# Patient Record
Sex: FEMALE | Race: WHITE | NOT HISPANIC OR LATINO | Employment: FULL TIME | URBAN - METROPOLITAN AREA
[De-identification: names, ages, dates, MRNs, and addresses within clinical notes are randomized per-mention and may not be internally consistent; named-entity substitution may affect disease eponyms.]

---

## 2017-01-26 ENCOUNTER — TELEPHONE (OUTPATIENT)
Dept: OBSTETRICS AND GYNECOLOGY | Facility: CLINIC | Age: 27
End: 2017-01-26

## 2017-01-26 NOTE — TELEPHONE ENCOUNTER
----- Message from Homa Ward MA sent at 2017  7:35 AM EST -----      ----- Message -----     From: Cynthia Reyer     Sent: 2017   3:28 PM       To: Homa Ward MA    Pt is without insurance. She may not not be able to have the Implanon removed at the time it is supposed to be . Will this harm her at all? Also, she is trying to figure out if she comes in as self pay can she be put on another generic birth control while she still has the device in? She said she thinks it expires the middle of March. If so, she will get the device removed when she gets insurance. Call back # [phone]    It is ok to keep it in longer than the 3 years but it loses effectivity. It still releases hormones, so I would not want to add birth control pills on top of the the Nexplanon. Can you find out how much it would cost her without insurance for us to take it out and when it is due out? If she cannot afford the fee, [facility].

## 2019-01-29 ENCOUNTER — TELEPHONE (OUTPATIENT)
Dept: OBSTETRICS AND GYNECOLOGY | Age: 29
End: 2019-01-29

## 2019-01-29 NOTE — TELEPHONE ENCOUNTER
NEW PATIENT  Insurance: [facility]  Patient needs AE and family planning.

## 2019-02-22 DIAGNOSIS — Z00.00 ROUTINE HEALTH MAINTENANCE: Primary | ICD-10-CM

## 2019-02-27 ENCOUNTER — CLINICAL SUPPORT (OUTPATIENT)
Dept: FAMILY MEDICINE CLINIC | Facility: CLINIC | Age: 29
End: 2019-02-27

## 2019-02-27 DIAGNOSIS — Z00.00 ROUTINE HEALTH MAINTENANCE: Primary | ICD-10-CM

## 2019-02-27 PROCEDURE — 85025 COMPLETE CBC W/AUTO DIFF WBC: CPT | Performed by: INTERNAL MEDICINE

## 2019-02-28 LAB
ALBUMIN SERPL-MCNC: 4.6 G/DL (ref 3.5–5.2)
ALBUMIN/GLOB SERPL: 1.8 G/DL
ALP SERPL-CCNC: 101 U/L (ref 39–117)
ALT SERPL-CCNC: 18 U/L (ref 1–33)
APPEARANCE UR: CLEAR
AST SERPL-CCNC: 11 U/L (ref 1–32)
BACTERIA #/AREA URNS HPF: ABNORMAL /HPF
BILIRUB SERPL-MCNC: 0.2 MG/DL (ref 0.1–1.2)
BILIRUB UR QL STRIP: NEGATIVE
BUN SERPL-MCNC: 10 MG/DL (ref 6–20)
BUN/CREAT SERPL: 15.4 (ref 7–25)
CALCIUM SERPL-MCNC: 9.5 MG/DL (ref 8.6–10.5)
CASTS URNS MICRO: ABNORMAL
CHLORIDE SERPL-SCNC: 102 MMOL/L (ref 98–107)
CHOLEST SERPL-MCNC: 172 MG/DL (ref 0–200)
CO2 SERPL-SCNC: 24.4 MMOL/L (ref 22–29)
COLOR UR: YELLOW
CREAT SERPL-MCNC: 0.65 MG/DL (ref 0.57–1)
EPI CELLS #/AREA URNS HPF: ABNORMAL /HPF
GLOBULIN SER CALC-MCNC: 2.6 GM/DL
GLUCOSE SERPL-MCNC: 102 MG/DL (ref 65–99)
GLUCOSE UR QL: NEGATIVE
HDLC SERPL-MCNC: 38 MG/DL (ref 40–60)
HGB UR QL STRIP: NEGATIVE
KETONES UR QL STRIP: NEGATIVE
LDLC SERPL CALC-MCNC: 103 MG/DL (ref 0–100)
LDLC/HDLC SERPL: 2.7 {RATIO}
LEUKOCYTE ESTERASE UR QL STRIP: ABNORMAL
NITRITE UR QL STRIP: NEGATIVE
PH UR STRIP: <=5 [PH] (ref 5–8)
POTASSIUM SERPL-SCNC: 4.2 MMOL/L (ref 3.5–5.2)
PROT SERPL-MCNC: 7.2 G/DL (ref 6–8.5)
PROT UR QL STRIP: NEGATIVE
RBC #/AREA URNS HPF: ABNORMAL /HPF
SODIUM SERPL-SCNC: 142 MMOL/L (ref 136–145)
SP GR UR: 1.02 (ref 1–1.03)
TRIGL SERPL-MCNC: 157 MG/DL (ref 0–150)
UROBILINOGEN UR STRIP-MCNC: ABNORMAL MG/DL
VLDLC SERPL CALC-MCNC: 31.4 MG/DL (ref 5–40)
WBC #/AREA URNS HPF: ABNORMAL /HPF

## 2019-03-04 ENCOUNTER — OFFICE VISIT (OUTPATIENT)
Dept: FAMILY MEDICINE CLINIC | Facility: CLINIC | Age: 29
End: 2019-03-04

## 2019-03-04 VITALS
RESPIRATION RATE: 16 BRPM | OXYGEN SATURATION: 98 % | BODY MASS INDEX: 37.4 KG/M2 | TEMPERATURE: 98.6 F | HEIGHT: 66 IN | DIASTOLIC BLOOD PRESSURE: 82 MMHG | WEIGHT: 232.7 LBS | SYSTOLIC BLOOD PRESSURE: 136 MMHG | HEART RATE: 93 BPM

## 2019-03-04 DIAGNOSIS — E66.9 OBESITY (BMI 35.0-39.9 WITHOUT COMORBIDITY): ICD-10-CM

## 2019-03-04 DIAGNOSIS — R73.9 HYPERGLYCEMIA: ICD-10-CM

## 2019-03-04 DIAGNOSIS — Z00.00 WELL ADULT EXAM: Primary | ICD-10-CM

## 2019-03-04 DIAGNOSIS — R03.0 ELEVATED BLOOD PRESSURE READING IN OFFICE WITH WHITE COAT SYNDROME, WITHOUT DIAGNOSIS OF HYPERTENSION: ICD-10-CM

## 2019-03-04 DIAGNOSIS — R73.01 ELEVATED FASTING GLUCOSE: Primary | ICD-10-CM

## 2019-03-04 LAB — HBA1C MFR BLD: 5.4 %

## 2019-03-04 PROCEDURE — 99395 PREV VISIT EST AGE 18-39: CPT | Performed by: INTERNAL MEDICINE

## 2019-03-04 PROCEDURE — 83036 HEMOGLOBIN GLYCOSYLATED A1C: CPT | Performed by: INTERNAL MEDICINE

## 2019-03-04 RX ORDER — NORETHINDRONE 0.35 MG/1
TABLET ORAL
COMMUNITY
Start: 2019-02-17 | End: 2019-03-04

## 2019-03-05 PROBLEM — R73.9 HYPERGLYCEMIA: Status: ACTIVE | Noted: 2019-03-05

## 2019-03-05 PROBLEM — Z00.00 WELL ADULT EXAM: Status: ACTIVE | Noted: 2019-03-05

## 2019-03-05 PROBLEM — E66.9 OBESITY (BMI 35.0-39.9 WITHOUT COMORBIDITY): Status: ACTIVE | Noted: 2019-03-05

## 2019-03-05 PROBLEM — R03.0 ELEVATED BLOOD PRESSURE READING IN OFFICE WITH WHITE COAT SYNDROME, WITHOUT DIAGNOSIS OF HYPERTENSION: Status: ACTIVE | Noted: 2019-03-05

## 2019-03-05 LAB
Lab: NORMAL
TSH SERPL DL<=0.005 MIU/L-ACNC: 2.62 MIU/ML (ref 0.27–4.2)
WRITTEN AUTHORIZATION: NORMAL

## 2019-03-05 NOTE — PROGRESS NOTES
Subjective   Belem Mcknight is a 28 y.o. female. Patient is here today for   Chief Complaint   Patient presents with   • Annual Exam     CPE           Vitals:    03/04/19 1439   BP: 136/82   Pulse: 93   Resp: 16   Temp: 98.6 °F (37 °C)   SpO2: 98%       The following portions of the patient's history were reviewed and updated as appropriate: allergies, current medications, past family history, past medical history, past social history, past surgical history and problem list.    History reviewed. No pertinent past medical history.   No Known Allergies   Social History     Socioeconomic History   • Marital status: Single     Spouse name: Not on file   • Number of children: Not on file   • Years of education: Not on file   • Highest education level: Not on file   Social Needs   • Financial resource strain: Not on file   • Food insecurity - worry: Not on file   • Food insecurity - inability: Not on file   • Transportation needs - medical: Not on file   • Transportation needs - non-medical: Not on file   Occupational History   • Not on file   Tobacco Use   • Smoking status: Never Smoker   Substance and Sexual Activity   • Alcohol use: No   • Drug use: No   • Sexual activity: Not on file   Other Topics Concern   • Not on file   Social History Narrative   • Not on file      No current outpatient medications on file.     EKG  ECG Report    Objective   This 28-year-old patient is here for a comprehensive physical exam.  She has an upcoming appointment to see a gynecologist to establish care with them.           Belem Mcknight 28 y.o. female who presents for an Annual Wellness Visit.  she has a history of   Patient Active Problem List   Diagnosis   • Hyperglycemia   • Well adult exam   • Elevated blood pressure reading in office with white coat syndrome, without diagnosis of hypertension   • Obesity (BMI 35.0-39.9 without comorbidity)   .  she has been doing well with new interval problems.  Labs results discussed in detail with  the patient.  Plan to update vaccines if needed today.        Lab Results (most recent)     None            Review of Systems   Constitutional: Negative.    HENT: Negative.    Eyes: Negative.    Respiratory: Negative.    Cardiovascular: Negative.    Gastrointestinal: Negative.    Endocrine: Negative.    Genitourinary: Negative.    Musculoskeletal: Negative.    Skin: Negative.    Allergic/Immunologic: Negative.    Neurological: Negative.    Hematological: Negative.    Psychiatric/Behavioral: Negative.        Physical Exam   Constitutional: She is oriented to person, place, and time. She appears well-developed and well-nourished. No distress.   Pleasant, neatly groomed, BMI 37.   HENT:   Head: Normocephalic and atraumatic.   Right Ear: External ear normal.   Left Ear: External ear normal.   Nose: Nose normal.   Mouth/Throat: Oropharynx is clear and moist. No oropharyngeal exudate.   Eyes: Conjunctivae and EOM are normal. Pupils are equal, round, and reactive to light. Right eye exhibits no discharge. Left eye exhibits no discharge. No scleral icterus.   Neck: Normal range of motion. Neck supple. No JVD present. No tracheal deviation present. No thyromegaly present.   Cardiovascular: Normal rate, regular rhythm, normal heart sounds and intact distal pulses. Exam reveals no gallop and no friction rub.   No murmur heard.  Pulmonary/Chest: Effort normal and breath sounds normal. No stridor. No respiratory distress. She has no wheezes. She has no rales. She exhibits no tenderness.   Abdominal: Soft. Bowel sounds are normal. She exhibits no distension and no mass. There is no tenderness. There is no rebound and no guarding. No hernia.   Genitourinary:   Genitourinary Comments: Deferred to gynecology.   Musculoskeletal: Normal range of motion. She exhibits no edema, tenderness or deformity.   Lymphadenopathy:     She has no cervical adenopathy.   Neurological: She is alert and oriented to person, place, and time. She  displays normal reflexes. No cranial nerve deficit or sensory deficit. She exhibits normal muscle tone. Coordination normal.   Skin: Skin is warm and dry. Capillary refill takes less than 2 seconds. No rash noted. She is not diaphoretic. No erythema. No pallor.   Psychiatric: She has a normal mood and affect. Her behavior is normal. Judgment and thought content normal.   Nursing note and vitals reviewed.      ASSESSMENT       Problem List Items Addressed This Visit        Digestive    Obesity (BMI 35.0-39.9 without comorbidity)       Other    Hyperglycemia    Well adult exam - Primary    Elevated blood pressure reading in office with white coat syndrome, without diagnosis of hypertension          PLAN  This patient and I discussed her labs.  She had an elevated fasting glucose of 102.  Check a hemoglobin A1c on her which was 5.4%.  I told her that I was concerned about her elevated glucose, and her obesity.  In the future, any medical condition that she develops will be a result of being obese.  She is struggled with being overweight all of her life.  She and her  recently begun  to pay attention to the dietary habits in an effort to lose weight.    She has elevated blood pressure today.  Perhaps this is an artifact of meeting me for the first time.  I did ask her to follow-up in 3-4 months to recheck her blood pressures.    She will be following up with gynecology.      There are no Patient Instructions on file for this visit.    No Follow-up on file.

## 2019-03-08 ENCOUNTER — TELEPHONE (OUTPATIENT)
Dept: FAMILY MEDICINE CLINIC | Facility: CLINIC | Age: 29
End: 2019-03-08

## 2019-03-08 NOTE — TELEPHONE ENCOUNTER
Pt was called and told per Octavio Tsh was normal pt also wanted to know why her Thyroid was enlarged as advised by  at last appt. I advised I would ask him and get back in contact with her. PT understood.     ----- Message from Alejandra Amaya sent at 3/8/2019  9:42 AM EST -----  Pt called in to get results from labs that  did in office on last visit on 3/4/19.  Please contact pt with results at [phone]

## 2019-03-11 ENCOUNTER — TELEPHONE (OUTPATIENT)
Dept: FAMILY MEDICINE CLINIC | Facility: CLINIC | Age: 29
End: 2019-03-11

## 2019-03-11 DIAGNOSIS — E04.9 ENLARGED THYROID: Primary | ICD-10-CM

## 2019-03-11 NOTE — TELEPHONE ENCOUNTER
PER. DR. BONILLA WOULD LIKE TO GET AN U/S OF HER THYROID.  THYROIDMEGALLUPE. AND WOULD LIKE HER TO F/U IN OFFICE TO DISCUSS. PT HAS BEEN ADVISED TO F/U UP WITHIN 48 OF HAVING U/S. SHE WAS TOLD TO CALL AFTER SCHEDULING TO HAVE U/S DONE TO CALL OFFICE AND SCHEDULE WITHIN 48HOURS OF HAVING F/U WITH DR. BONILLA AND DO NOT WAIT FOR U/S TO BE DONE TO CALL AND SCHEDULE. PT UNDERSTOOD.

## 2019-03-18 ENCOUNTER — HOSPITAL ENCOUNTER (OUTPATIENT)
Dept: ULTRASOUND IMAGING | Facility: HOSPITAL | Age: 29
Discharge: HOME | End: 2019-03-18
Admitting: INTERNAL MEDICINE

## 2019-03-18 PROCEDURE — 76536 US EXAM OF HEAD AND NECK: CPT

## 2019-03-20 ENCOUNTER — TELEPHONE (OUTPATIENT)
Dept: FAMILY MEDICINE CLINIC | Facility: CLINIC | Age: 29
End: 2019-03-20

## 2019-03-20 DIAGNOSIS — E04.1 THYROID NODULE: Primary | ICD-10-CM

## 2019-03-20 NOTE — TELEPHONE ENCOUNTER
CALLED PT ADVISED PT PER DR. BONILLA U/S OF THYROID     PROBABLY BENIGN, HOWEVER HE WOULD LIKE HER TO SEE DR. RAMÍREZ FORWITH. TO GET HIS OPIONION.     PT WAS PRAMDO Curran AND UNDERSTANDS.

## 2019-03-26 ENCOUNTER — OFFICE VISIT (OUTPATIENT)
Dept: OBSTETRICS AND GYNECOLOGY | Age: 29
End: 2019-03-26

## 2019-03-26 VITALS
DIASTOLIC BLOOD PRESSURE: 82 MMHG | WEIGHT: 293 LBS | HEIGHT: 66 IN | SYSTOLIC BLOOD PRESSURE: 126 MMHG | BODY MASS INDEX: 47.09 KG/M2

## 2019-03-26 DIAGNOSIS — Z12.4 SCREENING FOR MALIGNANT NEOPLASM OF CERVIX: ICD-10-CM

## 2019-03-26 DIAGNOSIS — Z13.9 SPECIAL SCREENING: Primary | ICD-10-CM

## 2019-03-26 DIAGNOSIS — E66.01 CLASS 3 SEVERE OBESITY DUE TO EXCESS CALORIES WITHOUT SERIOUS COMORBIDITY WITH BODY MASS INDEX (BMI) OF 50.0 TO 59.9 IN ADULT (HCC): ICD-10-CM

## 2019-03-26 DIAGNOSIS — Z31.69 ENCOUNTER FOR PRECONCEPTION CONSULTATION: ICD-10-CM

## 2019-03-26 PROBLEM — E66.813 CLASS 3 SEVERE OBESITY DUE TO EXCESS CALORIES WITHOUT SERIOUS COMORBIDITY IN ADULT: Status: ACTIVE | Noted: 2019-03-26

## 2019-03-26 LAB
B-HCG UR QL: NEGATIVE
INTERNAL NEGATIVE CONTROL: NEGATIVE
INTERNAL POSITIVE CONTROL: POSITIVE
Lab: NORMAL

## 2019-03-26 PROCEDURE — 99213 OFFICE O/P EST LOW 20 MIN: CPT | Performed by: OBSTETRICS & GYNECOLOGY

## 2019-03-26 PROCEDURE — 99385 PREV VISIT NEW AGE 18-39: CPT | Performed by: OBSTETRICS & GYNECOLOGY

## 2019-03-26 PROCEDURE — 81025 URINE PREGNANCY TEST: CPT | Performed by: OBSTETRICS & GYNECOLOGY

## 2019-03-26 RX ORDER — ACETAMINOPHEN AND CODEINE PHOSPHATE 120; 12 MG/5ML; MG/5ML
1 SOLUTION ORAL DAILY
Qty: 28 TABLET | Refills: 11 | Status: SHIPPED | OUTPATIENT
Start: 2019-03-26 | End: 2020-08-03

## 2019-03-26 RX ORDER — ACETAMINOPHEN AND CODEINE PHOSPHATE 120; 12 MG/5ML; MG/5ML
1 SOLUTION ORAL DAILY
COMMUNITY
End: 2019-03-26 | Stop reason: SDUPTHER

## 2019-03-26 RX ORDER — CETIRIZINE HYDROCHLORIDE 5 MG/1
5 TABLET ORAL DAILY
COMMUNITY
End: 2021-02-20 | Stop reason: HOSPADM

## 2019-03-26 NOTE — PROGRESS NOTES
Routine Annual Visit    3/26/2019    Patient: Belem Mcknight          MR#:[id number]      Chief Complaint   Patient presents with   • Gynecologic Exam     New pt. annual. last pap 2013 per pt        History of Present Illness    28 y.o. female  who presents for annual exam. She is not trying to get pregnant right now, however she would like to get pregnant in the near future.  She is currently on progesterone only contraception as she had adverse effects on a combined OCP.  She does have a history of regular menses.  Her  has no other children by another partner  She denies any significant medical problems besides her obesity    Health Maintenance  Gardasil unsure  Last pap 2013 per pt  Mammogram NA  Colonoscopy NA  PCP Octavio MD    Patient's last menstrual period was 2019 (exact date).  Obstetric History:  OB History      Para Term  AB Living    0 0 0 0 0 0    SAB TAB Ectopic Molar Multiple Live Births    0 0 0 0 0 0         Menstrual History:     Patient's last menstrual period was 2019 (exact date).       Sexual History:   Sexually active currently    ________________________________________  Patient Active Problem List   Diagnosis   • Class 3 severe obesity due to excess calories without serious comorbidity in adult (CMS/HCC)   • Encounter for preconception consultation       Past Medical History:   Diagnosis Date   • Headache    • Thyroid nodule        Family History   Problem Relation Age of Onset   • Colon cancer Maternal Grandmother    • Hypertension Maternal Grandfather    • Heart disease Paternal Aunt    • Depression Mother    • OCD Mother    • Neuropathy Mother    • Diabetes Paternal Grandmother    • Heart disease Paternal Grandmother    • Breast cancer Neg Hx    • Ovarian cancer Neg Hx    • Uterine cancer Neg Hx        History reviewed. No pertinent surgical history.    Social History     Tobacco Use   Smoking Status Never Smoker   Smokeless Tobacco Never Used  "      has a current medication list which includes the following prescription(s): cetirizine and norethindrone.  ________________________________________    Current contraception: oral progesterone-only contraceptive  History of abnormal Pap smear: no  Family history of Breast, ovarian, uterine, colon, pancreatic cancer: yes - Colon cancer in maternal grand mother  History of abnormal mammogram: no    The following portions of the patient's history were reviewed and updated as appropriate: allergies, current medications, past family history, past medical history, past social history, past surgical history and problem list.    Review of Systems    Objective   Physical Exam    /82   Ht 167.6 cm (66\")   Wt (!) 153 kg (338 lb)   LMP 02/18/2019 (Exact Date)   BMI 54.55 kg/m²    BP Readings from Last 3 Encounters:   03/26/19 126/82      Wt Readings from Last 3 Encounters:   03/26/19 (!) 153 kg (338 lb)         BMI: Body mass index is 54.55 kg/m².       General:   alert, appears stated age and cooperative   Heart:: regular rate and rhythm, S1, S2 normal, no murmur, click, rub or gallop   Lungs: normal respiratory effort and auscultation   Abdomen: soft, non-tender, without masses or organomegaly   Breast: inspection negative, no nipple discharge or bleeding, no masses or nodularity palpable   Urethra and bladder: urethral meatus normal; bladder nontender to palpation;   Vulva: normal, Bartholin's, Urethra, [place]'s normal   Vagina: normal mucosa, normal discharge   Cervix: no lesions and nulliparous appearance   Uterus: normal size or anteverted   Adnexa: normal adnexa and no mass, fullness, tenderness       Assessment:    normal annual exam     Plan:    Plan     []  Mammogram request made  [x]  PAP done  []  Labs:   []  GC/Chl/TV  []  DEXA scan   []  Referral for colonoscopy:     Problems Addressed this Visit        Digestive    Class 3 severe obesity due to excess calories without serious comorbidity in adult " (CMS/[facility])     Obesity is unchanged.  Discussed the patient's BMI.  The BMI is above average; BMI management plan is completed.  General weight loss/lifestyle modification strategies discussed (elicit support from others; identify saboteurs; non-food rewards, etc).    Plan to use whole 30 diet              Other    Encounter for preconception consultation     Her  may be a carrier for cystic fibrosis, desires carrier screening today  Recommended weight loss prior to getting pregnant  Also recommended taking folate containing prenatal vitamin prior to pregnancy           Other Visit Diagnoses     Special screening    -  Primary    Relevant Orders    POC Pregnancy, Urine (Completed)    Screening for malignant neoplasm of cervix        Relevant Orders    IGP, Apt HPV,rfx 16 / 18,45 (Completed)          Counseling  [x]  Nutrition  [x]  Physical activity/regular exercise   [x]  Healthy weight  []  Injury prevention  []  Smoking cessation  []  Substance misuse/abuse  [x]  Sexual behavior  []  STD prevention  [x]  Contraception  []  Dental health  []  Mental health  []  Immunization  []  Encouraged SBE      Patient's BMI is Body mass index is 54.55 kg/m²., which is classified as obese. We discussed health consequences of obesity and recommended weight loss. I counseled regarding diet modifications and exercise in order to reach weight loss goal.     Ana Perdomo MD  03/26/2019  11:45 AM

## 2019-03-26 NOTE — ASSESSMENT & PLAN NOTE
Her  may be a carrier for cystic fibrosis, desires carrier screening today  Recommended weight loss prior to getting pregnant  Also recommended taking folate containing prenatal vitamin prior to pregnancy

## 2019-03-26 NOTE — ASSESSMENT & PLAN NOTE
Obesity is unchanged.  Discussed the patient's BMI.  The BMI is above average; BMI management plan is completed.  General weight loss/lifestyle modification strategies discussed (elicit support from others; identify saboteurs; non-food rewards, etc).    Plan to use whole 30 diet

## 2019-03-29 ENCOUNTER — TELEPHONE (OUTPATIENT)
Dept: OBSTETRICS AND GYNECOLOGY | Age: 29
End: 2019-03-29

## 2019-03-29 LAB
CYTOLOGIST CVX/VAG CYTO: NORMAL
CYTOLOGY CVX/VAG DOC THIN PREP: NORMAL
DX ICD CODE: NORMAL
HIV 1 & 2 AB SER-IMP: NORMAL
HPV I/H RISK 4 DNA CVX QL PROBE+SIG AMP: NEGATIVE
Lab: NORMAL
OTHER STN SPEC: NORMAL
PATH REPORT.FINAL DX SPEC: NORMAL
STAT OF ADQ CVX/VAG CYTO-IMP: NORMAL

## 2019-03-29 NOTE — TELEPHONE ENCOUNTER
----- Message from Ana Perdomo MD sent at 3/29/2019 11:46 AM EDT -----  Please let her know that her pap is normal. Thanks.    ----- Message -----  From: Parvin Michaud MA  Sent: 3/26/2019  10:46 AM  To: Ana Perdomo MD

## 2019-04-01 ENCOUNTER — TELEPHONE (OUTPATIENT)
Dept: OBSTETRICS AND GYNECOLOGY | Age: 29
End: 2019-04-01

## 2019-04-01 NOTE — TELEPHONE ENCOUNTER
Dr Perdomo pt states she has received a bill for genetic testing from 3/26/19 yet needs to discuss her bill she received. Pt has concerns that her portion was quoted in office as less than $400 but her total bill was over $1900. Also stated that the bill with self pay stated it would be $500. Per  Marleni Mouser, Dr Perdomo's MA may be able to begin the process with pt. Also pt is aware that out , Roseanne is out for the week due to a family emergency.

## 2019-04-11 ENCOUNTER — TELEPHONE (OUTPATIENT)
Dept: OBSTETRICS AND GYNECOLOGY | Age: 29
End: 2019-04-11

## 2019-04-11 NOTE — TELEPHONE ENCOUNTER
----- Message from Ana Perdomo MD sent at 4/11/2019 11:00 AM EDT -----  Please let her know that her carrier status for 274 various genetic disorders is negative. Please also print out report and mail to her. Thanks!    ----- Message -----  From: Mechelle, Scans Incoming  Sent: 4/11/2019   7:03 AM  To: Ana Perdomo MD

## 2020-02-06 ENCOUNTER — TELEPHONE (OUTPATIENT)
Dept: OBSTETRICS AND GYNECOLOGY | Age: 30
End: 2020-02-06

## 2020-02-06 NOTE — TELEPHONE ENCOUNTER
(Conor schwartz) Pt is requesting to be seen sometime soon to be tested for pcos. Pt recently has had a lot of hair loss on her hair but hair gain on other parts of her body. Pt is also gaining weight while dieting/excersize. Pt has had her thyroid check and that was cleared. Please advise.     [phone]

## 2020-02-10 ENCOUNTER — OFFICE VISIT (OUTPATIENT)
Dept: FAMILY MEDICINE CLINIC | Facility: CLINIC | Age: 30
End: 2020-02-10

## 2020-02-10 VITALS
BODY MASS INDEX: 47.09 KG/M2 | SYSTOLIC BLOOD PRESSURE: 128 MMHG | WEIGHT: 293 LBS | OXYGEN SATURATION: 97 % | DIASTOLIC BLOOD PRESSURE: 78 MMHG | RESPIRATION RATE: 16 BRPM | TEMPERATURE: 98.6 F | HEART RATE: 86 BPM | HEIGHT: 66 IN

## 2020-02-10 DIAGNOSIS — R35.0 URINE FREQUENCY: Primary | ICD-10-CM

## 2020-02-10 DIAGNOSIS — E66.01 CLASS 3 SEVERE OBESITY DUE TO EXCESS CALORIES WITHOUT SERIOUS COMORBIDITY WITH BODY MASS INDEX (BMI) OF 50.0 TO 59.9 IN ADULT (HCC): ICD-10-CM

## 2020-02-10 DIAGNOSIS — R10.84 GENERALIZED ABDOMINAL PAIN: ICD-10-CM

## 2020-02-10 LAB
B-HCG UR QL: NEGATIVE
BILIRUB BLD-MCNC: NEGATIVE MG/DL
CLARITY, POC: CLEAR
COLOR UR: YELLOW
GLUCOSE UR STRIP-MCNC: NEGATIVE MG/DL
HCT VFR BLDA CALC: 47.1 % (ref 38–51)
HGB BLDA-MCNC: 15.6 G/DL (ref 12–17)
INTERNAL NEGATIVE CONTROL: NEGATIVE
INTERNAL POSITIVE CONTROL: NEGATIVE
KETONES UR QL: NEGATIVE
LEUKOCYTE EST, POC: NEGATIVE
Lab: NORMAL
MCH, POC: 32.9
MCHC, POC: 33.2
MCV, POC: 99.1
NITRITE UR-MCNC: NEGATIVE MG/ML
PH UR: 8.5 [PH] (ref 5–8)
PLATELET # BLD: 388 10*3/MM3
PMV BLD: 7.1 FL
PROT UR STRIP-MCNC: NEGATIVE MG/DL
RBC # UR STRIP: NEGATIVE /UL
RBC, POC: 4.75
RDW, POC: 12.1
SP GR UR: 1.02 (ref 1–1.03)
T3 SERPL-MCNC: 128 NG/DL (ref 80–200)
T4 FREE SERPL-MCNC: 1.17 NG/DL (ref 0.93–1.7)
TSH SERPL DL<=0.005 MIU/L-ACNC: 2.36 UIU/ML (ref 0.27–4.2)
UROBILINOGEN UR QL: NORMAL
WBC # BLD: 10.6 10*3/UL

## 2020-02-10 PROCEDURE — 85025 COMPLETE CBC W/AUTO DIFF WBC: CPT | Performed by: NURSE PRACTITIONER

## 2020-02-10 PROCEDURE — 81003 URINALYSIS AUTO W/O SCOPE: CPT | Performed by: NURSE PRACTITIONER

## 2020-02-10 PROCEDURE — 99214 OFFICE O/P EST MOD 30 MIN: CPT | Performed by: NURSE PRACTITIONER

## 2020-02-10 PROCEDURE — 81025 URINE PREGNANCY TEST: CPT | Performed by: NURSE PRACTITIONER

## 2020-02-10 NOTE — PROGRESS NOTES
"Amara Mcknight is a 29 y.o.. female.     Abdominal Pain   This is a new problem. The current episode started yesterday. The problem occurs intermittently. The problem has been unchanged. The pain is located in the RUQ and generalized abdominal region. The quality of the pain is cramping, aching and colicky. The abdominal pain radiates to the RUQ and periumbilical region. Associated symptoms include frequency and nausea. Pertinent negatives include no diarrhea, dysuria, fever, hematuria or vomiting. The pain is aggravated by movement. Relieved by: laying on side, in fetal position. Treatments tried: gas x and antacids (tums) The treatment provided no relief.       The following portions of the patient's history were reviewed and updated as appropriate: allergies, current medications, past family history, past medical history, past social history, past surgical history and problem list.    Past Medical History:   Diagnosis Date   • Headache    • Thyroid nodule        No past surgical history on file.    Review of Systems   Constitutional: Negative for fever.   Gastrointestinal: Positive for abdominal pain and nausea. Negative for diarrhea and vomiting.        Distended, bloated, gassy, belching  1 bm q day, soft   Genitourinary: Positive for frequency. Negative for dysuria, hematuria and urgency.     LMP:1/11/2020     No Known Allergies    Objective     Vitals:    02/10/20 1106   BP: 128/78   Pulse: 86   Resp: 16   Temp: 98.6 °F (37 °C)   SpO2: 97%   Weight: (!) 150 kg (331 lb)   Height: 167.6 cm (65.98\")     Body mass index is 53.45 kg/m².    Physical Exam   Constitutional: She is oriented to person, place, and time. She appears well-developed and well-nourished.   HENT:   Head: Normocephalic.   Eyes: Pupils are equal, round, and reactive to light.   Cardiovascular: Normal rate and regular rhythm.   Pulmonary/Chest: Effort normal and breath sounds normal.   Abdominal: Soft. Normal appearance and bowel " sounds are normal. She exhibits no distension and no mass. There is no hepatosplenomegaly. There is tenderness in the right upper quadrant and periumbilical area. There is no rigidity, no rebound and no guarding.   Musculoskeletal: Normal range of motion.   Neurological: She is alert and oriented to person, place, and time.   Vitals reviewed.        Current Outpatient Medications:   •  cetirizine (zyrTEC) 5 MG tablet, Take 5 mg by mouth Daily., Disp: , Rfl:   •  norethindrone (ACOSTA) 0.35 MG tablet, Take 1 tablet by mouth Daily., Disp: 28 tablet, Rfl: 11    Lab Results (last 24 hours)     ** No results found for the last 24 hours. **            Assessment/Plan   Belem was seen today for abdominal pain.    Diagnoses and all orders for this visit:    Urine frequency  -     POC Urinalysis Dipstick, Automated    Generalized abdominal pain  -     POC CBC With / Auto Diff  -     POCT pregnancy, urine  -     Cancel: XR abdomen flat and upright; Future  -     US Abdomen Complete; Future    Class 3 severe obesity due to excess calories without serious comorbidity with body mass index (BMI) of 50.0 to 59.9 in adult (CMS/HCC)  -     TSH  -     T4, free  -     T3        Patient Instructions   Drink plenty of water  Avoid dairy, greasy/fried foods, and spicy foods  [place]/brat diet        Return if symptoms worsen or fail to improve.

## 2020-02-11 ENCOUNTER — HOSPITAL ENCOUNTER (OUTPATIENT)
Dept: ULTRASOUND IMAGING | Facility: HOSPITAL | Age: 30
Discharge: HOME | End: 2020-02-11
Admitting: NURSE PRACTITIONER

## 2020-02-11 DIAGNOSIS — R10.84 GENERALIZED ABDOMINAL PAIN: ICD-10-CM

## 2020-02-11 PROCEDURE — 76700 US EXAM ABDOM COMPLETE: CPT

## 2020-02-12 DIAGNOSIS — K80.80 BILIARY CALCULUS OF OTHER SITE WITHOUT OBSTRUCTION: Primary | ICD-10-CM

## 2020-02-13 ENCOUNTER — TELEPHONE (OUTPATIENT)
Dept: FAMILY MEDICINE CLINIC | Facility: CLINIC | Age: 30
End: 2020-02-13

## 2020-02-13 NOTE — TELEPHONE ENCOUNTER
Called and spoke with pt regarding u/s results. Pt informed of referral to general surgery. Pt informed if having any worsening symptoms to go to ER. Pt verb. Understanding.

## 2020-07-16 ENCOUNTER — TELEPHONE (OUTPATIENT)
Dept: OBSTETRICS AND GYNECOLOGY | Age: 30
End: 2020-07-16

## 2020-07-16 NOTE — TELEPHONE ENCOUNTER
I s/w Germaine about this over the phone. Work pt in for u/s tomorrow morning but she needs to go to ER for worsening pain and/or bleeding.

## 2020-07-16 NOTE — TELEPHONE ENCOUNTER
(Conor pt) Pt's lmp was 6/7/2020 and she is scheduled for 8/3/2020, patient just went to the bathroom and has a very light faint pink spotting, pt is having light cramping with it that feel like menstrual cramps. Pt felt like she pulled a stomach muscle yesterday when she tried to get up to quickly off the couch. Please advise.     [phone]

## 2020-07-17 ENCOUNTER — PROCEDURE VISIT (OUTPATIENT)
Dept: OBSTETRICS AND GYNECOLOGY | Age: 30
End: 2020-07-17

## 2020-07-17 ENCOUNTER — OFFICE VISIT (OUTPATIENT)
Dept: OBSTETRICS AND GYNECOLOGY | Age: 30
End: 2020-07-17

## 2020-07-17 VITALS
WEIGHT: 293 LBS | BODY MASS INDEX: 47.09 KG/M2 | SYSTOLIC BLOOD PRESSURE: 118 MMHG | HEIGHT: 66 IN | DIASTOLIC BLOOD PRESSURE: 72 MMHG

## 2020-07-17 DIAGNOSIS — E66.9 OBESITY (BMI 35.0-39.9 WITHOUT COMORBIDITY): Primary | ICD-10-CM

## 2020-07-17 DIAGNOSIS — O36.80X0 ENCOUNTER TO DETERMINE FETAL VIABILITY OF PREGNANCY, SINGLE OR UNSPECIFIED FETUS: ICD-10-CM

## 2020-07-17 DIAGNOSIS — O20.0 THREATENED ABORTION: Primary | ICD-10-CM

## 2020-07-17 PROCEDURE — 76817 TRANSVAGINAL US OBSTETRIC: CPT | Performed by: OBSTETRICS & GYNECOLOGY

## 2020-07-17 PROCEDURE — 99214 OFFICE O/P EST MOD 30 MIN: CPT | Performed by: NURSE PRACTITIONER

## 2020-07-17 NOTE — PROGRESS NOTES
"Subjective   Belem Mcknight is a 29 y.o. female is being seen today for   Chief Complaint   Patient presents with   • Threatened Miscarriage     early ob c/o spotting yesterday no cramping   .    History of Present Illness     Patient here with pink/brown spotting yesterday and positive pregnancy test  Cycles were previously normal- very regular  States LMP was 6/7 (it was way early and lasted 3 days).  She did have a day of \"heavy\" bleeding during that time  She checked a test on 6/25 due to some nausea and breast enlargement and it was +  Since then she had not had bleeding or concerns  She did notice some more often car sickness, cramping and nipple sensitivity  They were trying for pregnancy and are very excited    She had carrier screening last year and it was negative  She is unsure of her blood type  + history of varicella as a child  Previously diagnosed with thyroid nodule-she saw ENT and labs were normal  She was supposed to recheck ultrasound but hasn't had that scheduled yet  She has no new health concerns or medication changes, she is only taking PNV with DHA    The following portions of the patient's history were reviewed and updated as appropriate: allergies, current medications, past family history, past medical history, past social history, past surgical history and problem list.    /72   Ht 167.6 cm (66\")   Wt (!) 148 kg (326 lb)   LMP 06/07/2020 (Exact Date)   Breastfeeding No   BMI 52.62 kg/m²     Review of Systems   Constitutional: Negative.    HENT: Negative.    Eyes: Negative.    Respiratory: Negative.         Nipple sensitivity   Cardiovascular: Negative.    Gastrointestinal: Negative.    Endocrine: Negative.    Genitourinary: Negative.         Brown spotting   Musculoskeletal: Negative.    Skin: Negative.    Allergic/Immunologic: Negative.    Neurological: Negative.    Hematological: Negative.    Psychiatric/Behavioral: Negative.        Objective   Physical Exam   Constitutional: " "She is oriented to person, place, and time. She appears well-developed and well-nourished.   Cardiovascular: Normal rate and regular rhythm.   Pulmonary/Chest: Effort normal.   Abdominal: Soft. Bowel sounds are normal. She exhibits no distension. There is no tenderness.   Neurological: She is alert and oriented to person, place, and time.   Skin: Skin is warm and dry.   Psychiatric: She has a normal mood and affect. Her behavior is normal.         Assessment/Plan   Belem was seen today for threatened miscarriage.    Diagnoses and all orders for this visit:    Threatened   -     OB Panel With HIV  -     Urine Culture - Urine, Urine, Clean Catch  -     TSH  -     Hemoglobin A1c      Ultrasound done- per LMP would be 5w5d but ultrasound shows 8w3d.  Previous \"period\" would have actually been early pregnancy- check type and screen to verify negative antibodies  + cardiac, yolk sac and fetal pole, normal ovaries  Pelvic rest for now and call with additional bleeding  Plan follow up as scheduled for NOB with Dr Yarelis george advised         "

## 2020-07-19 LAB
ABO GROUP BLD: ABNORMAL
BACTERIA UR CULT: ABNORMAL
BACTERIA UR CULT: ABNORMAL
BASOPHILS # BLD AUTO: 0 X10E3/UL (ref 0–0.2)
BASOPHILS NFR BLD AUTO: 0 %
BLD GP AB SCN SERPL QL: NEGATIVE
EOSINOPHIL # BLD AUTO: 0.1 X10E3/UL (ref 0–0.4)
EOSINOPHIL NFR BLD AUTO: 1 %
ERYTHROCYTE [DISTWIDTH] IN BLOOD BY AUTOMATED COUNT: 12.9 % (ref 11.7–15.4)
HBA1C MFR BLD: 5.2 % (ref 4.8–5.6)
HBV SURFACE AG SERPL QL IA: NEGATIVE
HCT VFR BLD AUTO: 43.2 % (ref 34–46.6)
HCV AB S/CO SERPL IA: <0.1 S/CO RATIO (ref 0–0.9)
HGB BLD-MCNC: 14.6 G/DL (ref 11.1–15.9)
HIV 1+2 AB+HIV1 P24 AG SERPL QL IA: NON REACTIVE
IMM GRANULOCYTES # BLD AUTO: 0 X10E3/UL (ref 0–0.1)
IMM GRANULOCYTES NFR BLD AUTO: 0 %
LYMPHOCYTES # BLD AUTO: 2 X10E3/UL (ref 0.7–3.1)
LYMPHOCYTES NFR BLD AUTO: 19 %
MCH RBC QN AUTO: 33.9 PG (ref 26.6–33)
MCHC RBC AUTO-ENTMCNC: 33.8 G/DL (ref 31.5–35.7)
MCV RBC AUTO: 100 FL (ref 79–97)
MONOCYTES # BLD AUTO: 0.8 X10E3/UL (ref 0.1–0.9)
MONOCYTES NFR BLD AUTO: 8 %
NEUTROPHILS # BLD AUTO: 7.6 X10E3/UL (ref 1.4–7)
NEUTROPHILS NFR BLD AUTO: 72 %
OTHER ANTIBIOTIC SUSC ISLT: ABNORMAL
PLATELET # BLD AUTO: 311 X10E3/UL (ref 150–450)
RBC # BLD AUTO: 4.31 X10E6/UL (ref 3.77–5.28)
RH BLD: POSITIVE
RPR SER QL: NON REACTIVE
RUBV IGG SERPL IA-ACNC: 7.08 INDEX
TSH SERPL DL<=0.005 MIU/L-ACNC: 1.36 UIU/ML (ref 0.27–4.2)
WBC # BLD AUTO: 10.6 X10E3/UL (ref 3.4–10.8)

## 2020-07-23 ENCOUNTER — TELEPHONE (OUTPATIENT)
Dept: OBSTETRICS AND GYNECOLOGY | Age: 30
End: 2020-07-23

## 2020-07-23 RX ORDER — NITROFURANTOIN 25; 75 MG/1; MG/1
100 CAPSULE ORAL 2 TIMES DAILY
Qty: 14 CAPSULE | Refills: 0 | Status: SHIPPED | OUTPATIENT
Start: 2020-07-23 | End: 2020-07-30

## 2020-07-23 NOTE — TELEPHONE ENCOUNTER
----- Message from ARIES Mccauley sent at 7/23/2020 11:33 AM EDT -----  Please notify patient her urine culture returned positive for infection. Antibiotic sent to pharmacy.

## 2020-07-24 NOTE — TELEPHONE ENCOUNTER
Gave pt results. She has already picked up RX and has started taking the medicine, so far no complications with RX.

## 2020-08-03 ENCOUNTER — INITIAL PRENATAL (OUTPATIENT)
Dept: OBSTETRICS AND GYNECOLOGY | Age: 30
End: 2020-08-03

## 2020-08-03 ENCOUNTER — PROCEDURE VISIT (OUTPATIENT)
Dept: OBSTETRICS AND GYNECOLOGY | Age: 30
End: 2020-08-03

## 2020-08-03 VITALS — SYSTOLIC BLOOD PRESSURE: 132 MMHG | BODY MASS INDEX: 51.33 KG/M2 | DIASTOLIC BLOOD PRESSURE: 70 MMHG | WEIGHT: 293 LBS

## 2020-08-03 DIAGNOSIS — E66.9 OBESITY (BMI 35.0-39.9 WITHOUT COMORBIDITY): Primary | ICD-10-CM

## 2020-08-03 DIAGNOSIS — O36.80X0 ENCOUNTER TO DETERMINE FETAL VIABILITY OF PREGNANCY, SINGLE OR UNSPECIFIED FETUS: ICD-10-CM

## 2020-08-03 DIAGNOSIS — Z13.89 SCREENING FOR HEMATURIA OR PROTEINURIA: Primary | ICD-10-CM

## 2020-08-03 DIAGNOSIS — Z34.00 SUPERVISION OF NORMAL FIRST PREGNANCY, ANTEPARTUM: ICD-10-CM

## 2020-08-03 PROBLEM — Z00.00 WELL ADULT EXAM: Status: RESOLVED | Noted: 2019-03-05 | Resolved: 2020-08-03

## 2020-08-03 LAB
GLUCOSE UR STRIP-MCNC: NEGATIVE MG/DL
PROT UR STRIP-MCNC: NEGATIVE MG/DL

## 2020-08-03 PROCEDURE — 76817 TRANSVAGINAL US OBSTETRIC: CPT | Performed by: OBSTETRICS & GYNECOLOGY

## 2020-08-03 PROCEDURE — 0502F SUBSEQUENT PRENATAL CARE: CPT | Performed by: OBSTETRICS & GYNECOLOGY

## 2020-08-03 NOTE — PROGRESS NOTES
Chief Complaint   Patient presents with   • Routine Prenatal Visit     10w3d, no complaints. last pap 3/26/19(-)        HPI:  Pt presents for routine prenatal visit   She has had a little bit of spotting recently, yesterday she had bright red blood when she wiped.  Today she had a tiny bit of spotting as well but was just brown.  She has no cramping.    She brings up that she has a long history of back pain and sciatica.  She notes that she had a fracture of her tailbone previously and was told that she should just have a .  She brings up that she would like to schedule a .  She has never had any pelvic surgery, did not have any repair of pelvic fracture.  Mild nausea  Working from home    ROS:  No fever or chills,  no contractions, no leg pain, no LE edema,  no headache, no dysuria  All other systems reviewed and negative    Exam:  See flow sheet  General:  Alert and oriented and no distress  Neck: no lymphadenopathy or thyromegaly  Lungs: non - labored breathing  Abd:  See flow sheet, fundus nontender  Ext: see flow sheet, non-tender bilateral , no lesions  Neuro: grossly normal  Pelvic exam with scant brown blood in the vaginal vault, no active bleeding from the cervix  Cervix appears closed    Assessment:/ PLAN:  29 y.o.  at 10w3d     her bleedings appears to be benign, normal viable IUP today, blood type is B+    Normal PNL except UTI, she received treatment and plan to repeat urine culture next time    She declines genetic screening, she did have a normal carrier screening prior to pregnancy    I discussed with her that a  delivery did not appear to be indicated from the history that she gives.  I discussed the increased maternal morbidity associated with  delivery, especially with her increased BMI.  If otherwise safe and appropriate, would recommend vaginal delivery.    We discussed in detail risks of obesity with pregnancy and recommendation for limited weight  gain.    Pregnancy Risk:  HIGH RISK     Return for 4 wks OB Follow up.    Ana Perdomo MD  08/03/2020  14:37

## 2020-08-28 ENCOUNTER — TELEPHONE (OUTPATIENT)
Dept: OBSTETRICS AND GYNECOLOGY | Age: 30
End: 2020-08-28

## 2020-08-28 NOTE — TELEPHONE ENCOUNTER
Patient is currently 14 weeks pregnant and states the pain is between her belly button and pubic bone.

## 2020-08-28 NOTE — TELEPHONE ENCOUNTER
(Conor Pt)     Pt called stating when she is laying on her back, she feels a sharp poking pain under her bellybutton.Pt states its very intermittent and denies discharge, no bleeding, no abdominal pain.     [phone]

## 2020-08-28 NOTE — TELEPHONE ENCOUNTER
It could be bowel or bladder.  If she is not having any changes or concerns with either of those I would just observe for now to see if it passes but if she has increasing pain, fever or bleeding she should go to ER to be checked out.

## 2020-09-02 ENCOUNTER — ROUTINE PRENATAL (OUTPATIENT)
Dept: OBSTETRICS AND GYNECOLOGY | Age: 30
End: 2020-09-02

## 2020-09-02 VITALS — WEIGHT: 293 LBS | DIASTOLIC BLOOD PRESSURE: 80 MMHG | SYSTOLIC BLOOD PRESSURE: 124 MMHG | BODY MASS INDEX: 50.2 KG/M2

## 2020-09-02 DIAGNOSIS — Z34.02 PRENATAL CARE, FIRST PREGNANCY, SECOND TRIMESTER: Primary | ICD-10-CM

## 2020-09-02 DIAGNOSIS — Z13.89 SCREENING FOR HEMATURIA OR PROTEINURIA: ICD-10-CM

## 2020-09-02 DIAGNOSIS — O23.42 URINARY TRACT INFECTION IN MOTHER DURING SECOND TRIMESTER OF PREGNANCY: ICD-10-CM

## 2020-09-02 DIAGNOSIS — Z3A.14 14 WEEKS GESTATION OF PREGNANCY: ICD-10-CM

## 2020-09-02 LAB
BILIRUB BLD-MCNC: NEGATIVE MG/DL
CLARITY, POC: CLEAR
COLOR UR: YELLOW
GLUCOSE UR STRIP-MCNC: NEGATIVE MG/DL
GLUCOSE UR STRIP-MCNC: NEGATIVE MG/DL
KETONES UR QL: NEGATIVE
LEUKOCYTE EST, POC: NEGATIVE
NITRITE UR-MCNC: NEGATIVE MG/ML
PH UR: 6.5 [PH] (ref 5–8)
PROT UR STRIP-MCNC: NEGATIVE MG/DL
PROT UR STRIP-MCNC: NEGATIVE MG/DL
RBC # UR STRIP: NEGATIVE /UL
SP GR UR: 1.02 (ref 1–1.03)
UROBILINOGEN UR QL: NORMAL

## 2020-09-02 PROCEDURE — 0502F SUBSEQUENT PRENATAL CARE: CPT | Performed by: OBSTETRICS & GYNECOLOGY

## 2020-09-04 LAB
BACTERIA UR CULT: NORMAL
BACTERIA UR CULT: NORMAL

## 2020-09-30 ENCOUNTER — PROCEDURE VISIT (OUTPATIENT)
Dept: OBSTETRICS AND GYNECOLOGY | Age: 30
End: 2020-09-30

## 2020-09-30 ENCOUNTER — ROUTINE PRENATAL (OUTPATIENT)
Dept: OBSTETRICS AND GYNECOLOGY | Age: 30
End: 2020-09-30

## 2020-09-30 VITALS — BODY MASS INDEX: 49.55 KG/M2 | WEIGHT: 293 LBS | DIASTOLIC BLOOD PRESSURE: 70 MMHG | SYSTOLIC BLOOD PRESSURE: 110 MMHG

## 2020-09-30 DIAGNOSIS — Z36.89 ENCOUNTER FOR FETAL ANATOMIC SURVEY: Primary | ICD-10-CM

## 2020-09-30 DIAGNOSIS — Z13.89 SCREENING FOR HEMATURIA OR PROTEINURIA: Primary | ICD-10-CM

## 2020-09-30 DIAGNOSIS — Z13.1 SCREENING FOR DIABETES MELLITUS: ICD-10-CM

## 2020-09-30 LAB
BILIRUB BLD-MCNC: ABNORMAL MG/DL
GLUCOSE UR STRIP-MCNC: NEGATIVE MG/DL
GLUCOSE UR STRIP-MCNC: NEGATIVE MG/DL
KETONES UR QL: ABNORMAL
LEUKOCYTE EST, POC: NEGATIVE
NITRITE UR-MCNC: NEGATIVE MG/ML
PH UR: 5.5 [PH] (ref 5–8)
PROT UR STRIP-MCNC: ABNORMAL MG/DL
PROT UR STRIP-MCNC: NEGATIVE MG/DL
RBC # UR STRIP: NEGATIVE /UL
SP GR UR: 1.02 (ref 1–1.03)
UROBILINOGEN UR QL: NORMAL

## 2020-09-30 PROCEDURE — 76805 OB US >/= 14 WKS SNGL FETUS: CPT | Performed by: OBSTETRICS & GYNECOLOGY

## 2020-09-30 PROCEDURE — 0502F SUBSEQUENT PRENATAL CARE: CPT | Performed by: OBSTETRICS & GYNECOLOGY

## 2020-09-30 NOTE — PROGRESS NOTES
Chief Complaint   Patient presents with   • Routine Prenatal Visit     18w3d, no complaints      She says she has been drinking lots of water but her urine still looks dark.  She had a bulging disc recently, was standing washing her dishes and all of a sudden she felt something pop in her back, she says she popped the disc back in.  She has been going to the chiropractor.    Anatomy scan normal but incomplete, return in 4 weeks for follow-up views.  Early glucose challenge today  Declined flu vaccine  Recommend against chiropractor, recommended alternative of physical therapy.  Declines currently.    Ana Perdomo MD  9/30/2020  16:37 EDT

## 2020-10-01 LAB — GLUCOSE 1H P 50 G GLC PO SERPL-MCNC: 138 MG/DL (ref 65–139)

## 2020-10-05 DIAGNOSIS — O99.810 ABNORMAL GLUCOSE AFFECTING PREGNANCY: Primary | ICD-10-CM

## 2020-10-09 ENCOUNTER — LAB (OUTPATIENT)
Dept: OBSTETRICS AND GYNECOLOGY | Age: 30
End: 2020-10-09

## 2020-10-10 ENCOUNTER — RESULTS ENCOUNTER (OUTPATIENT)
Dept: OBSTETRICS AND GYNECOLOGY | Age: 30
End: 2020-10-10

## 2020-10-10 DIAGNOSIS — O99.810 ABNORMAL GLUCOSE AFFECTING PREGNANCY: ICD-10-CM

## 2020-10-10 LAB
GLUCOSE 1H P 100 G GLC PO SERPL-MCNC: 159 MG/DL (ref 65–179)
GLUCOSE 2H P 100 G GLC PO SERPL-MCNC: 118 MG/DL (ref 65–154)
GLUCOSE 3H P 100 G GLC PO SERPL-MCNC: 58 MG/DL (ref 65–139)
GLUCOSE P FAST SERPL-MCNC: 81 MG/DL (ref 65–94)

## 2020-10-26 ENCOUNTER — PROCEDURE VISIT (OUTPATIENT)
Dept: OBSTETRICS AND GYNECOLOGY | Age: 30
End: 2020-10-26

## 2020-10-26 ENCOUNTER — ROUTINE PRENATAL (OUTPATIENT)
Dept: OBSTETRICS AND GYNECOLOGY | Age: 30
End: 2020-10-26

## 2020-10-26 VITALS — DIASTOLIC BLOOD PRESSURE: 80 MMHG | WEIGHT: 293 LBS | SYSTOLIC BLOOD PRESSURE: 126 MMHG | BODY MASS INDEX: 48.74 KG/M2

## 2020-10-26 DIAGNOSIS — Z13.89 SCREENING FOR HEMATURIA OR PROTEINURIA: Primary | ICD-10-CM

## 2020-10-26 DIAGNOSIS — IMO0002 EVALUATE ANATOMY NOT SEEN ON PRIOR SONOGRAM: ICD-10-CM

## 2020-10-26 DIAGNOSIS — O44.03 PLACENTA PREVIA, THIRD TRIMESTER: ICD-10-CM

## 2020-10-26 DIAGNOSIS — Z34.00 SUPERVISION OF NORMAL FIRST PREGNANCY, ANTEPARTUM: ICD-10-CM

## 2020-10-26 LAB
GLUCOSE UR STRIP-MCNC: NEGATIVE MG/DL
PROT UR STRIP-MCNC: NEGATIVE MG/DL

## 2020-10-26 PROCEDURE — 0502F SUBSEQUENT PRENATAL CARE: CPT | Performed by: OBSTETRICS & GYNECOLOGY

## 2020-10-26 NOTE — PROGRESS NOTES
Chief Complaint   Patient presents with   • Routine Prenatal Visit     22w6d, no complaints      She is well, no complaints currently  No nausea, vomiting, eating well    Follow up anatomy still limited today, plan referral to [facility]  Early GCT abnl, 3 hr normal    Posterior placenta previa- pelvic rest and will check again    4 wk FU tdap and repeat GCT    Ana Perdomo MD  10/26/2020  15:15 EDT

## 2020-11-11 ENCOUNTER — OFFICE VISIT (OUTPATIENT)
Dept: OBSTETRICS AND GYNECOLOGY | Facility: CLINIC | Age: 30
End: 2020-11-11

## 2020-11-11 ENCOUNTER — TELEPHONE (OUTPATIENT)
Dept: OBSTETRICS AND GYNECOLOGY | Age: 30
End: 2020-11-11

## 2020-11-11 ENCOUNTER — HOSPITAL ENCOUNTER (OUTPATIENT)
Dept: ULTRASOUND IMAGING | Facility: HOSPITAL | Age: 30
Discharge: HOME | End: 2020-11-11
Admitting: OBSTETRICS & GYNECOLOGY

## 2020-11-11 ENCOUNTER — TRANSCRIBE ORDERS (OUTPATIENT)
Dept: ULTRASOUND IMAGING | Facility: HOSPITAL | Age: 30
End: 2020-11-11

## 2020-11-11 VITALS
HEART RATE: 103 BPM | TEMPERATURE: 97.7 F | WEIGHT: 293 LBS | DIASTOLIC BLOOD PRESSURE: 57 MMHG | BODY MASS INDEX: 47.09 KG/M2 | HEIGHT: 66 IN | SYSTOLIC BLOOD PRESSURE: 124 MMHG

## 2020-11-11 DIAGNOSIS — Z34.00 SUPERVISION OF NORMAL FIRST PREGNANCY, ANTEPARTUM: ICD-10-CM

## 2020-11-11 DIAGNOSIS — Z36.89 ENCOUNTER FOR ULTRASOUND TO ASSESS FETAL GROWTH: ICD-10-CM

## 2020-11-11 DIAGNOSIS — E66.01 CLASS 3 SEVERE OBESITY DUE TO EXCESS CALORIES WITHOUT SERIOUS COMORBIDITY WITH BODY MASS INDEX (BMI) OF 50.0 TO 59.9 IN ADULT (HCC): Primary | ICD-10-CM

## 2020-11-11 DIAGNOSIS — Z13.79 GENETIC SCREENING: ICD-10-CM

## 2020-11-11 DIAGNOSIS — O28.5 ABNORMAL CHROMOSOMAL AND GENETIC FINDING ON ANTENATAL SCREENING MOTHER: Primary | ICD-10-CM

## 2020-11-11 DIAGNOSIS — Z91.89 AT RISK FOR HYPERTENSION: ICD-10-CM

## 2020-11-11 DIAGNOSIS — O44.00 PLACENTA PREVIA ANTEPARTUM: ICD-10-CM

## 2020-11-11 PROCEDURE — 99243 OFF/OP CNSLTJ NEW/EST LOW 30: CPT | Performed by: OBSTETRICS & GYNECOLOGY

## 2020-11-11 PROCEDURE — 76811 OB US DETAILED SNGL FETUS: CPT

## 2020-11-11 PROCEDURE — 76817 TRANSVAGINAL US OBSTETRIC: CPT

## 2020-11-11 PROCEDURE — 76817 TRANSVAGINAL US OBSTETRIC: CPT | Performed by: OBSTETRICS & GYNECOLOGY

## 2020-11-11 PROCEDURE — 76811 OB US DETAILED SNGL FETUS: CPT | Performed by: OBSTETRICS & GYNECOLOGY

## 2020-11-11 NOTE — TELEPHONE ENCOUNTER
(Conor pt)  Pt returning Dr. Perdomo's call concerning her labs.   Please call pt back at your earliest convenience.    [phone]

## 2020-11-12 ENCOUNTER — TELEPHONE (OUTPATIENT)
Dept: OBSTETRICS AND GYNECOLOGY | Age: 30
End: 2020-11-12

## 2020-11-12 NOTE — TELEPHONE ENCOUNTER
Patient called, pt not'd of message left in the encounter, pt would like to speak with  personally    Pt is aware that  is out of office and will return call at earliest convenience.

## 2020-11-12 NOTE — PROGRESS NOTES
"MATERNAL FETAL MEDICINE OUTPATIENT NOTE     Dear Dr Perdomo     This is a  new visit.     Thank you for requesting my service to provide consultation for Belem Mcknight is a 29 y.o.   at 25 1/7 weeks gestation (Estimated Date of Delivery: 21).   She is being seen for fetal assessment and genetic consultation.     Today, she denies headache, blurry vision, RUQ pain. No vaginal bleeding, no contractions.     Chief complaint    ICD-10-CM ICD-9-CM   1. Abnormal chromosomal and genetic finding on  screening mother  O28.5 796.5   2. Genetic screening  Z13.79 V82.79   3. BMI 40.0-44.9, adult (CMS/[facility])  Z68.41 V85.41   4. Placenta previa antepartum  O44.00 641.13   5. Encounter for ultrasound to assess fetal growth  Z36.89 V28.3   6. At risk for hypertension  Z91.89 V15.89       Past obstetric, gynecological, medical, surgical, family and social history reviewed; no changes made.     Review of systems  Constitutional:  No Weight Change, No Fever, No Chills, No Fatigue, No Malaise  ENT/Mouth:  No Hearing Changes, No Sinus Pain, No Hoarseness, No sore throat, No   Eyes:  No Eye Pain, No Vision Changes  Cardiovascular:  No Chest Pain, No SOB, No Palpitations  Respiratory:  No Cough, No Wheezing, No Dyspnea  Gastrointestinal:  No Nausea, No Vomiting, No Diarrhea, No Constipation, No Pain   Genitourinary:   No Dysuria, No Urinary Frequency, No Hematuria, No Flank Pain  Musculoskeletal:  No Arthralgias, No Myalgias,   Skin:  No Skin Lesions, No Pruritis,   Neuro:  No Weakness, No Numbness, No Loss of Consciousness, No Syncope  Psych:  No Memory Changes, No Violence/Abuse Hx., No Eating Concerns  Heme/Lymph:  No Bruising, No Bleeding  Endocrine:   No Temperature Intolerance    Vitals:    20 1207   BP: 124/57   BP Location: Right arm   Patient Position: Sitting   Cuff Size: Large Adult   Pulse: 103   Temp: 97.7 °F (36.5 °C)   TempSrc: Infrared   Weight: (!) 137 kg (302 lb)   Height: 167.6 cm (66\") "       PHYSICAL EXAM   GENERAL: Not in acute distress, gravid   NEURO: awake, alert and oriented to person, place, and time  ABDOMINAL: No fundal tenderness, no rebound or guarding   EXTREMITIES: Bilaterally lower extremities No edema, no tenderness  PELVIC: No obvious vaginal discharge, no bleeding    ULTRASOUND   Please view full ultrasound note on Imaging tab in ViewPoint.      ASSESSMENT   29 y.o.   at 25 1/7  weeks gestation (Estimated Date of Delivery: 21), reassuring fetal and maternal status.     1. Single live intrauterine pregnancy   [ X ] stable  [   ] improving [  ] worsening    2. NIPT results - insufficient fetal DNA  [ X ] stable  [   ] improving [  ] worsening    NIPT results reviewed. Some women undergoing noninvasive prenatal testing (NIPT) do not receive an in-formative result due to low fetal fraction (FF). A proportion of these are at increased risk for fetal trisomy 13, 18, or triploidy, while others have no change from their prior risk. Some patients, however, results are not reported because of laboratory technical issues such as low fetal fraction and sequencing failures.     The patient's age related risk for aneuploidy was reviewed. Noninvasive prenatal screening with cell-free fetal DNA (NIPT) results reviewed. Differences between genetic screening with NIPT and invasive diagnostic testing with amniocentesis in the second trimester were reviewed. Limitations NIPT were reviewed as well as differences in detection rate and false-positive rate. The risk of fetal loss associated with invasive testing with amniocentesis was reviewed. Patient DECLINED amniocentesis.    3. Risk for hypertension  [ X ] stable  [   ] improving [  ] worsening    Noted on prenatal care records - 128/90  Recommend home blood pressures cuff for monitoring several times weekly in second trimester and daily in third trimester. Reports to L+D for any BP > 140/90    4. Obesity in pregnancy  [ X ] stable  [   ]  "improving [  ] worsening    Obesity increases the risk of hypertensive disorders, diabetes and operative delivery. We discussed recommended weight gain of 11-20 lb during pregnancy and discussed the importance of diet modifications and exercise. Technical limitations (eg, maternal obesity, prone fetal position, and late gestation) can make a detailed heart evaluation very difficult due to acoustic shadowing. Therefore, fetal anatomy is suboptimal and will likely remain suboptimal for the remainder of the pregnancy    Per ACOG: Women in pregnancy should aim for 30 minutes of moderate-intensity aerobic exercise at least 5 days a week or a minimum of 150 minutes per week    5. Placenta previa noted.   [ X ] stable  [   ] improving [  ] worsening    -The leading edge of the placenta appears within 1 cm of the internal cervical os.   -Discussed with the patient that the previas diagnosed at this early gestational age will usually \"migrate\" away from the cervix by the third trimester, but that  delivery timed at 36-37 weeks may be needed if the previa persists             PLAN  -Re-assess lower uterine segment at approximately 28-30 weeks gestation   -Serial growth ultrasounds every 3 - 4 weeks   -Starting at 28 weeks: Fetal movement instructions given continue daily until delivery; instructed to report to labor and delivery if cannot achieve more than 10 kicks in one hour or if she perceives a decrease in fetal movement        This note has been routed to the referring obstetricians/medical provider.   Thank you for your clinical consult.     LEOBARDO LO MD MPH FACOG  MATERNAL FETAL MEDICINE       "

## 2020-11-16 ENCOUNTER — TELEPHONE (OUTPATIENT)
Dept: OBSTETRICS AND GYNECOLOGY | Age: 30
End: 2020-11-16

## 2020-11-16 NOTE — TELEPHONE ENCOUNTER
Dr. Perdomo OB pt called had an appointment today, however had to cancel and is wondering if they can just come in for a 1 hour GTT sometime this week or if they need an appointment with that as well?    If an appointment is required please advise on scheduling.    [phone]

## 2020-11-16 NOTE — TELEPHONE ENCOUNTER
Yes, more so needs an appointment because she needs her Tdap vaccine as well.  She can reschedule for 1 to 2 weeks if she likes.  Can over book somewhere if she wants to come next week.

## 2020-11-25 ENCOUNTER — ROUTINE PRENATAL (OUTPATIENT)
Dept: OBSTETRICS AND GYNECOLOGY | Age: 30
End: 2020-11-25

## 2020-11-25 VITALS — SYSTOLIC BLOOD PRESSURE: 126 MMHG | BODY MASS INDEX: 48.74 KG/M2 | DIASTOLIC BLOOD PRESSURE: 78 MMHG | WEIGHT: 293 LBS

## 2020-11-25 DIAGNOSIS — Z34.03 ENCOUNTER FOR SUPERVISION OF NORMAL FIRST PREGNANCY IN THIRD TRIMESTER: ICD-10-CM

## 2020-11-25 DIAGNOSIS — Z13.0 SCREENING FOR IRON DEFICIENCY ANEMIA: ICD-10-CM

## 2020-11-25 DIAGNOSIS — Z13.1 SCREENING FOR DIABETES MELLITUS: ICD-10-CM

## 2020-11-25 DIAGNOSIS — Z13.89 SCREENING FOR HEMATURIA OR PROTEINURIA: Primary | ICD-10-CM

## 2020-11-25 LAB
ERYTHROCYTE [DISTWIDTH] IN BLOOD BY AUTOMATED COUNT: 12 % (ref 12.3–15.4)
GLUCOSE 1H P 50 G GLC PO SERPL-MCNC: 151 MG/DL (ref 65–139)
GLUCOSE UR STRIP-MCNC: NEGATIVE MG/DL
HCT VFR BLD AUTO: 37.5 % (ref 34–46.6)
HGB BLD-MCNC: 13 G/DL (ref 12–15.9)
MCH RBC QN AUTO: 33.2 PG (ref 26.6–33)
MCHC RBC AUTO-ENTMCNC: 34.7 G/DL (ref 31.5–35.7)
MCV RBC AUTO: 95.7 FL (ref 79–97)
PLATELET # BLD AUTO: 268 10*3/MM3 (ref 140–450)
PROT UR STRIP-MCNC: NEGATIVE MG/DL
RBC # BLD AUTO: 3.92 10*6/MM3 (ref 3.77–5.28)
WBC # BLD AUTO: 11.46 10*3/MM3 (ref 3.4–10.8)

## 2020-11-25 PROCEDURE — 90471 IMMUNIZATION ADMIN: CPT | Performed by: OBSTETRICS & GYNECOLOGY

## 2020-11-25 PROCEDURE — 0502F SUBSEQUENT PRENATAL CARE: CPT | Performed by: OBSTETRICS & GYNECOLOGY

## 2020-11-25 PROCEDURE — 90715 TDAP VACCINE 7 YRS/> IM: CPT | Performed by: OBSTETRICS & GYNECOLOGY

## 2020-11-25 NOTE — PROGRESS NOTES
Chief Complaint   Patient presents with   • Routine Prenatal Visit     27w1d, no complaints        HPI:  Pt presents for routine prenatal visit. She notes some occasional cramping but no regular contractions  She does have some back and pelvic pain, has been going to PT and has been helpful    ROS:  No fever or chills, no nausea or vomiting, no contractions, no leg pain, no LE edema, no leaking fluid, no bleeding, no headache, no dysuria  All other systems reviewed and negative    Exam:  See flow sheet  General:  Alert and oriented and no distress  Neck: no lymphadenopathy or thyromegaly  Lungs: non - labored breathing  Abd:  See flow sheet, fundus nontender  Ext: see flow sheet, non-tender bilateral , no lesions  Neuro: grossly normal    Assessment:/ PLAN:  29 y.o.  at 27w1d    GTT and tdap today, HGB  Reviewed movement precautions  Placenta previa still, follow up with MFM      Follow up in 4 wks then every 2 wks    Ana Perdomo MD  2020  10:47 EST

## 2020-11-30 DIAGNOSIS — O99.810 ABNORMAL GLUCOSE TOLERANCE TEST (GTT) DURING PREGNANCY, ANTEPARTUM: Primary | ICD-10-CM

## 2020-11-30 PROBLEM — Z31.69 ENCOUNTER FOR PRECONCEPTION CONSULTATION: Status: RESOLVED | Noted: 2019-03-26 | Resolved: 2020-11-30

## 2020-11-30 NOTE — PROGRESS NOTES
Please let her know that her 1 hour was elevated at 151, she does need to complete the 3-hour test soon and this was ordered  She has no anemia at this time    Also please tell her happy birthday and sorry to give her this bad news on her birthday    Ana Perdomo MD  11/30/2020  16:22 EST

## 2020-12-01 ENCOUNTER — TELEPHONE (OUTPATIENT)
Dept: OBSTETRICS AND GYNECOLOGY | Age: 30
End: 2020-12-01

## 2020-12-01 NOTE — TELEPHONE ENCOUNTER
Attempted to call back, unable to reach.  Left voicemail and I will try to call her back    We can treat her as if she has gestational diabetes and start checking her blood sugar if she desires.  She may be able to discontinue after a few weeks of all of her values are completely normal.    Ana Perdomo MD  12/1/2020  16:27 EST

## 2020-12-01 NOTE — TELEPHONE ENCOUNTER
----- Message from Parvin Michaud MA sent at 12/1/2020 12:10 PM EST -----  Pt states she would rather check her sugars at home rather than do the 3 hr GTT again. She would like a call back from you today to discuss further instruction.

## 2020-12-01 NOTE — TELEPHONE ENCOUNTER
Dr Perdomo OB pt 28 weeks is having concerns with her care and would like to see Dr Parekh for the remainder of their pregnancy.  Please advise.

## 2020-12-02 ENCOUNTER — TELEPHONE (OUTPATIENT)
Dept: OBSTETRICS AND GYNECOLOGY | Age: 30
End: 2020-12-02

## 2020-12-02 RX ORDER — BLOOD-GLUCOSE METER
KIT MISCELLANEOUS
Qty: 1 EACH | Refills: 0 | Status: SHIPPED | OUTPATIENT
Start: 2020-12-02 | End: 2023-02-22

## 2020-12-02 RX ORDER — BLOOD SUGAR DIAGNOSTIC
STRIP MISCELLANEOUS
Qty: 200 EACH | Refills: 12 | Status: SHIPPED | OUTPATIENT
Start: 2020-12-02 | End: 2021-02-20 | Stop reason: HOSPADM

## 2020-12-02 RX ORDER — LANCETS 30 GAUGE
1 EACH MISCELLANEOUS 4 TIMES DAILY
Qty: 200 EACH | Refills: 11 | Status: SHIPPED | OUTPATIENT
Start: 2020-12-02 | End: 2022-11-23

## 2020-12-02 RX ORDER — BLOOD SUGAR DIAGNOSTIC
1 STRIP MISCELLANEOUS 4 TIMES DAILY
Qty: 200 EACH | Refills: 11 | Status: SHIPPED | OUTPATIENT
Start: 2020-12-02 | End: 2022-11-23

## 2020-12-02 NOTE — TELEPHONE ENCOUNTER
Called patient back and was able to reach her, discussed that the standard would be to perform the 3-hour glucose test to rule in or rule out gestational diabetes.  Discussed that if the 3-hour test were abnormal, she would definitely have the diagnosis and we need to check her glucoses and modify her diet.  If the 3-hour were normal, she would not need to continue checking her glucoses. She declines to complete the standard 3 hr    After reviewing the rationale for the testing, and outcomes if it were normal or abnormal, she still desires to start checking her glucose 4 times a day.  I discussed the need to check fasting, either 1 or 2 hours after meals consistently and to keep a log to bring to appointments. Will email log to record glucoses, testing supplies sent to to pharmacy    Ana Perdomo MD  12/2/2020  17:19 EST

## 2020-12-05 ENCOUNTER — RESULTS ENCOUNTER (OUTPATIENT)
Dept: OBSTETRICS AND GYNECOLOGY | Age: 30
End: 2020-12-05

## 2020-12-05 DIAGNOSIS — O99.810 ABNORMAL GLUCOSE TOLERANCE TEST (GTT) DURING PREGNANCY, ANTEPARTUM: ICD-10-CM

## 2020-12-30 ENCOUNTER — HOSPITAL ENCOUNTER (OUTPATIENT)
Dept: ULTRASOUND IMAGING | Facility: HOSPITAL | Age: 30
Discharge: HOME | End: 2020-12-30
Admitting: OBSTETRICS & GYNECOLOGY

## 2020-12-30 ENCOUNTER — OFFICE VISIT (OUTPATIENT)
Dept: OBSTETRICS AND GYNECOLOGY | Facility: CLINIC | Age: 30
End: 2020-12-30

## 2020-12-30 VITALS
BODY MASS INDEX: 47.09 KG/M2 | WEIGHT: 293 LBS | SYSTOLIC BLOOD PRESSURE: 134 MMHG | DIASTOLIC BLOOD PRESSURE: 85 MMHG | HEIGHT: 66 IN | HEART RATE: 107 BPM | TEMPERATURE: 97.7 F

## 2020-12-30 DIAGNOSIS — O28.5 ABNORMAL CHROMOSOMAL AND GENETIC FINDING ON ANTENATAL SCREENING MOTHER: ICD-10-CM

## 2020-12-30 DIAGNOSIS — O99.810 ABNORMAL GLUCOSE TOLERANCE TEST (GTT) DURING PREGNANCY, ANTEPARTUM: ICD-10-CM

## 2020-12-30 DIAGNOSIS — E04.1 THYROID NODULE: ICD-10-CM

## 2020-12-30 DIAGNOSIS — O44.00 PLACENTA PREVIA ANTEPARTUM: Primary | ICD-10-CM

## 2020-12-30 DIAGNOSIS — E66.01 CLASS 3 SEVERE OBESITY DUE TO EXCESS CALORIES WITHOUT SERIOUS COMORBIDITY WITH BODY MASS INDEX (BMI) OF 50.0 TO 59.9 IN ADULT (HCC): ICD-10-CM

## 2020-12-30 DIAGNOSIS — Z91.89 AT RISK FOR HYPERTENSION: ICD-10-CM

## 2020-12-30 PROCEDURE — 76817 TRANSVAGINAL US OBSTETRIC: CPT | Performed by: OBSTETRICS & GYNECOLOGY

## 2020-12-30 PROCEDURE — 99214 OFFICE O/P EST MOD 30 MIN: CPT | Performed by: OBSTETRICS & GYNECOLOGY

## 2020-12-30 PROCEDURE — 76819 FETAL BIOPHYS PROFIL W/O NST: CPT | Performed by: OBSTETRICS & GYNECOLOGY

## 2020-12-30 PROCEDURE — 76817 TRANSVAGINAL US OBSTETRIC: CPT

## 2020-12-30 PROCEDURE — 76819 FETAL BIOPHYS PROFIL W/O NST: CPT

## 2020-12-30 PROCEDURE — 76816 OB US FOLLOW-UP PER FETUS: CPT | Performed by: OBSTETRICS & GYNECOLOGY

## 2020-12-30 PROCEDURE — 76816 OB US FOLLOW-UP PER FETUS: CPT

## 2020-12-30 RX ORDER — FLUTICASONE PROPIONATE 50 MCG
2 SPRAY, SUSPENSION (ML) NASAL DAILY
COMMUNITY
End: 2021-02-20 | Stop reason: HOSPADM

## 2020-12-31 NOTE — PROGRESS NOTES
MATERNAL FETAL MEDICINE OUTPATIENT NOTE     Dear [facility] (recent transfer of providers)     This is a  new  / followup visit.     Thank you for requesting my service to provide consultation for Belem Mcknight is a 30 y.o.   at  32 2/7 weeks gestation (Estimated Date of Delivery: 21).   She is being seen for:fetal growth and placenta assessment     Today, she denies headache, blurry vision, RUQ pain. No vaginal bleeding, no contractions.     Chief complaint    ICD-10-CM ICD-9-CM   1. Placenta previa antepartum  O44.00 641.13   2. At risk for hypertension  Z91.89 V15.89   3. Abnormal glucose tolerance test (GTT) during pregnancy, antepartum  O99.810 648.83   4. BMI 40.0-44.9, adult (CMS/HCC)  Z68.41 V85.41   5. Abnormal chromosomal and genetic finding on  screening mother  O28.5 796.5   6. Thyroid nodule  E04.1 241.0       Past obstetric, gynecological, medical, surgical, family and social history reviewed; no changes made.     Review of systems  Constitutional:  No Weight Change, No Fever, No Chills, No Fatigue, No Malaise  ENT/Mouth:  No Hearing Changes, No Sinus Pain, No Hoarseness, No sore throat, No   Eyes:  No Eye Pain, No Vision Changes  Cardiovascular:  No Chest Pain, No SOB, No Palpitations  Respiratory:  No Cough, No Wheezing, No Dyspnea  Gastrointestinal:  No Nausea, No Vomiting, No Diarrhea, No Constipation, No Pain   Genitourinary:   No Dysuria, No Urinary Frequency, No Hematuria, No Flank Pain  Musculoskeletal:  No Arthralgias, No Myalgias,   Skin:  No Skin Lesions, No Pruritis,   Neuro:  No Weakness, No Numbness, No Loss of Consciousness, No Syncope  Psych:  No Memory Changes, No Violence/Abuse Hx., No Eating Concerns  Heme/Lymph:  No Bruising, No Bleeding  Endocrine:   No Temperature Intolerance    Vitals:    20 1328 20 1426   BP: 142/84 134/85   BP Location: Right arm    Patient Position: Sitting    Cuff Size: Large Adult    Pulse: 112 107   Temp: 97.7 °F (36.5  "°C)    TempSrc: Infrared    Weight: (!) 138 kg (305 lb)    Height: 167.6 cm (66\")        PHYSICAL EXAM   GENERAL: Not in acute distress, gravid   NEURO: awake, alert and oriented to person, place, and time  ABDOMINAL: No fundal tenderness, no rebound or guarding   EXTREMITIES: Bilaterally lower extremities No edema, no tenderness  PELVIC: No obvious vaginal discharge, no bleeding    ULTRASOUND   Please view full ultrasound note on Imaging tab in [employer].      ASSESSMENT   30 y.o.   at  32 2/7 weeks gestation (Estimated Date of Delivery: 21), reassuring fetal and maternal status.     1. Single live intrauterine pregnancy   [ X ] stable  [   ] improving [  ] worsening  2. NIPT results - insufficient fetal DNA  [ X ] stable  [   ] improving [  ] worsening     NIPT results reviewed. Some women undergoing noninvasive prenatal testing (NIPT) do not receive an in-formative result due to low fetal fraction (FF). A proportion of these are at increased risk for fetal trisomy 13, 18, or triploidy, while others have no change from their prior risk. Some patients, however, results are not reported because of laboratory technical issues such as low fetal fraction and sequencing failures.      The patient's age related risk for aneuploidy was reviewed. Noninvasive prenatal screening with cell-free fetal DNA (NIPT) results reviewed. Differences between genetic screening with NIPT and invasive diagnostic testing with amniocentesis in the second trimester were reviewed. Limitations NIPT were reviewed as well as differences in detection rate and false-positive rate. The risk of fetal loss associated with invasive testing with amniocentesis was reviewed. Patient DECLINED amniocentesis.     3. Risk for hypertension - likely CHTN   [  ] stable  [   ] improving [ X ] worsening    BP today -  142/84  134/85  Noted on prenatal care records - 128/90 in first trimester     Patient had internal medicine visit in 2019 - suspected " then having CHTN   Recommend home blood pressures cuff for monitoring several times weekly in second trimester and daily in third trimester. Reports to L+D for any persistantly BP > 160/100     4. Obesity in pregnancy and elevated GCT - risk of gestational diabetes   [  ] stable  [   ] improving [ X ] worsening    Followup 3hr GTT      5. Placenta previa  - resolving just within 2cm from internal cervical os. Warrants re-examination   [ X ] stable  [   ] improving [  ] worsening     -The leading edge of the placenta appears within 2 cm of the internal cervical os.         6. Thyroid nodule - recommend consultation with Endocrinology to determine if repeat thyroid ultrasound is warranted.  2019 - ultrasound:  1 cm mixed cystic and solid nodule in the right lobe of the thyroid, otherwise normal thyroid sonogram.        PLAN  -Re-assess lower uterine segment at approximately 34-36 weeks gestation by primary OB   -Serial growth ultrasounds every 3 - 4 weeks   - Fetal movement instructions given continue daily until delivery; instructed to report to labor and delivery if cannot achieve more than 10 kicks in one hour or if she perceives a decrease in fetal movement  - recommend consultation with Endocrinology to determine if repeat thyroid ultrasound is warranted.         This note has been routed to the referring obstetricians/medical provider.   Thank you for your clinical consult.     LEOBARDO LO MD MPH FACOG  MATERNAL FETAL MEDICINE

## 2021-01-27 LAB — EXTERNAL GROUP B STREP ANTIGEN: NEGATIVE

## 2021-02-18 ENCOUNTER — ANESTHESIA EVENT (OUTPATIENT)
Dept: LABOR AND DELIVERY | Facility: HOSPITAL | Age: 31
End: 2021-02-18

## 2021-02-18 ENCOUNTER — HOSPITAL ENCOUNTER (INPATIENT)
Facility: HOSPITAL | Age: 31
LOS: 2 days | Discharge: HOME | End: 2021-02-20
Attending: OBSTETRICS & GYNECOLOGY | Admitting: OBSTETRICS & GYNECOLOGY

## 2021-02-18 ENCOUNTER — ANESTHESIA (OUTPATIENT)
Dept: LABOR AND DELIVERY | Facility: HOSPITAL | Age: 31
End: 2021-02-18

## 2021-02-18 DIAGNOSIS — Z34.90 PREGNANCY, UNSPECIFIED GESTATIONAL AGE: Primary | ICD-10-CM

## 2021-02-18 LAB
ABO GROUP BLD: NORMAL
BASOPHILS # BLD AUTO: 0.04 10*3/MM3 (ref 0–0.2)
BASOPHILS NFR BLD AUTO: 0.4 % (ref 0–1.5)
BLD GP AB SCN SERPL QL: NEGATIVE
DEPRECATED RDW RBC AUTO: 42.1 FL (ref 37–54)
EOSINOPHIL # BLD AUTO: 0.13 10*3/MM3 (ref 0–0.4)
EOSINOPHIL NFR BLD AUTO: 1.2 % (ref 0.3–6.2)
ERYTHROCYTE [DISTWIDTH] IN BLOOD BY AUTOMATED COUNT: 12.1 % (ref 12.3–15.4)
HCT VFR BLD AUTO: 38.5 % (ref 34–46.6)
HGB BLD-MCNC: 13.5 G/DL (ref 12–15.9)
IMM GRANULOCYTES # BLD AUTO: 0.07 10*3/MM3 (ref 0–0.05)
IMM GRANULOCYTES NFR BLD AUTO: 0.6 % (ref 0–0.5)
LYMPHOCYTES # BLD AUTO: 1.75 10*3/MM3 (ref 0.7–3.1)
LYMPHOCYTES NFR BLD AUTO: 16.2 % (ref 19.6–45.3)
MCH RBC QN AUTO: 33.3 PG (ref 26.6–33)
MCHC RBC AUTO-ENTMCNC: 35.1 G/DL (ref 31.5–35.7)
MCV RBC AUTO: 94.8 FL (ref 79–97)
MONOCYTES # BLD AUTO: 0.9 10*3/MM3 (ref 0.1–0.9)
MONOCYTES NFR BLD AUTO: 8.3 % (ref 5–12)
NEUTROPHILS NFR BLD AUTO: 7.93 10*3/MM3 (ref 1.7–7)
NEUTROPHILS NFR BLD AUTO: 73.3 % (ref 42.7–76)
NRBC BLD AUTO-RTO: 0 /100 WBC (ref 0–0.2)
PLATELET # BLD AUTO: 198 10*3/MM3 (ref 140–450)
PMV BLD AUTO: 9.3 FL (ref 6–12)
RBC # BLD AUTO: 4.06 10*6/MM3 (ref 3.77–5.28)
RH BLD: POSITIVE
SARS-COV-2 RNA RESP QL NAA+PROBE: NOT DETECTED
T&S EXPIRATION DATE: NORMAL
WBC # BLD AUTO: 10.82 10*3/MM3 (ref 3.4–10.8)

## 2021-02-18 PROCEDURE — 25010000002 MORPHINE PER 10 MG: Performed by: ANESTHESIOLOGY

## 2021-02-18 PROCEDURE — 25010000002 PHENYLEPHRINE PER 1 ML: Performed by: NURSE ANESTHETIST, CERTIFIED REGISTERED

## 2021-02-18 PROCEDURE — U0003 INFECTIOUS AGENT DETECTION BY NUCLEIC ACID (DNA OR RNA); SEVERE ACUTE RESPIRATORY SYNDROME CORONAVIRUS 2 (SARS-COV-2) (CORONAVIRUS DISEASE [COVID-19]), AMPLIFIED PROBE TECHNIQUE, MAKING USE OF HIGH THROUGHPUT TECHNOLOGIES AS DESCRIBED BY CMS-2020-01-R: HCPCS | Performed by: OBSTETRICS & GYNECOLOGY

## 2021-02-18 PROCEDURE — 25010000002 FENTANYL CITRATE (PF) 100 MCG/2ML SOLUTION: Performed by: ANESTHESIOLOGY

## 2021-02-18 PROCEDURE — 85025 COMPLETE CBC W/AUTO DIFF WBC: CPT | Performed by: OBSTETRICS & GYNECOLOGY

## 2021-02-18 PROCEDURE — 88307 TISSUE EXAM BY PATHOLOGIST: CPT

## 2021-02-18 PROCEDURE — 25010000002 ONDANSETRON PER 1 MG: Performed by: ANESTHESIOLOGY

## 2021-02-18 PROCEDURE — 25010000002 CEFAZOLIN PER 500 MG: Performed by: OBSTETRICS & GYNECOLOGY

## 2021-02-18 PROCEDURE — 86850 RBC ANTIBODY SCREEN: CPT | Performed by: OBSTETRICS & GYNECOLOGY

## 2021-02-18 PROCEDURE — 86900 BLOOD TYPING SEROLOGIC ABO: CPT | Performed by: OBSTETRICS & GYNECOLOGY

## 2021-02-18 PROCEDURE — 86901 BLOOD TYPING SEROLOGIC RH(D): CPT | Performed by: OBSTETRICS & GYNECOLOGY

## 2021-02-18 RX ORDER — SIMETHICONE 80 MG
80 TABLET,CHEWABLE ORAL 4 TIMES DAILY PRN
Status: DISCONTINUED | OUTPATIENT
Start: 2021-02-18 | End: 2021-02-20 | Stop reason: HOSPADM

## 2021-02-18 RX ORDER — FAMOTIDINE 10 MG/ML
20 INJECTION, SOLUTION INTRAVENOUS ONCE AS NEEDED
Status: COMPLETED | OUTPATIENT
Start: 2021-02-18 | End: 2021-02-18

## 2021-02-18 RX ORDER — LANOLIN
CREAM (ML) TOPICAL AS NEEDED
Status: DISCONTINUED | OUTPATIENT
Start: 2021-02-18 | End: 2021-02-20 | Stop reason: HOSPADM

## 2021-02-18 RX ORDER — HYDROCODONE BITARTRATE AND ACETAMINOPHEN 7.5; 325 MG/1; MG/1
1 TABLET ORAL EVERY 4 HOURS PRN
Status: DISCONTINUED | OUTPATIENT
Start: 2021-02-18 | End: 2021-02-20 | Stop reason: HOSPADM

## 2021-02-18 RX ORDER — OXYTOCIN-SODIUM CHLORIDE 0.9% IV SOLN 30 UNIT/500ML 30-0.9/5 UT/ML-%
125 SOLUTION INTRAVENOUS CONTINUOUS PRN
Status: COMPLETED | OUTPATIENT
Start: 2021-02-18 | End: 2021-02-18

## 2021-02-18 RX ORDER — IBUPROFEN 600 MG/1
600 TABLET ORAL EVERY 8 HOURS PRN
Status: DISCONTINUED | OUTPATIENT
Start: 2021-02-18 | End: 2021-02-20 | Stop reason: HOSPADM

## 2021-02-18 RX ORDER — ERYTHROMYCIN 5 MG/G
OINTMENT OPHTHALMIC
Status: DISPENSED
Start: 2021-02-18 | End: 2021-02-19

## 2021-02-18 RX ORDER — CEFAZOLIN SODIUM IN 0.9 % NACL 3 G/100 ML
3 INTRAVENOUS SOLUTION, PIGGYBACK (ML) INTRAVENOUS ONCE
Status: COMPLETED | OUTPATIENT
Start: 2021-02-18 | End: 2021-02-18

## 2021-02-18 RX ORDER — CALCIUM CARBONATE 200(500)MG
2 TABLET,CHEWABLE ORAL DAILY
COMMUNITY
End: 2023-02-18 | Stop reason: HOSPADM

## 2021-02-18 RX ORDER — SODIUM CHLORIDE, SODIUM LACTATE, POTASSIUM CHLORIDE, CALCIUM CHLORIDE 600; 310; 30; 20 MG/100ML; MG/100ML; MG/100ML; MG/100ML
125 INJECTION, SOLUTION INTRAVENOUS CONTINUOUS
Status: DISCONTINUED | OUTPATIENT
Start: 2021-02-18 | End: 2021-02-18

## 2021-02-18 RX ORDER — ONDANSETRON 2 MG/ML
4 INJECTION INTRAMUSCULAR; INTRAVENOUS ONCE AS NEEDED
Status: DISCONTINUED | OUTPATIENT
Start: 2021-02-18 | End: 2021-02-20 | Stop reason: HOSPADM

## 2021-02-18 RX ORDER — OXYTOCIN-SODIUM CHLORIDE 0.9% IV SOLN 30 UNIT/500ML 30-0.9/5 UT/ML-%
250 SOLUTION INTRAVENOUS CONTINUOUS PRN
Status: ACTIVE | OUTPATIENT
Start: 2021-02-18 | End: 2021-02-18

## 2021-02-18 RX ORDER — ONDANSETRON 4 MG/1
4 TABLET, FILM COATED ORAL EVERY 8 HOURS PRN
Status: DISCONTINUED | OUTPATIENT
Start: 2021-02-18 | End: 2021-02-20 | Stop reason: HOSPADM

## 2021-02-18 RX ORDER — ONDANSETRON 2 MG/ML
4 INJECTION INTRAMUSCULAR; INTRAVENOUS ONCE AS NEEDED
Status: COMPLETED | OUTPATIENT
Start: 2021-02-18 | End: 2021-02-18

## 2021-02-18 RX ORDER — MORPHINE SULFATE 1 MG/ML
INJECTION, SOLUTION EPIDURAL; INTRATHECAL; INTRAVENOUS
Status: COMPLETED | OUTPATIENT
Start: 2021-02-18 | End: 2021-02-18

## 2021-02-18 RX ORDER — MISOPROSTOL 200 UG/1
800 TABLET ORAL AS NEEDED
Status: DISCONTINUED | OUTPATIENT
Start: 2021-02-18 | End: 2021-02-18

## 2021-02-18 RX ORDER — DIPHENHYDRAMINE HYDROCHLORIDE 50 MG/ML
25 INJECTION INTRAMUSCULAR; INTRAVENOUS EVERY 4 HOURS PRN
Status: DISCONTINUED | OUTPATIENT
Start: 2021-02-18 | End: 2021-02-20 | Stop reason: HOSPADM

## 2021-02-18 RX ORDER — MORPHINE SULFATE 2 MG/ML
2 INJECTION, SOLUTION INTRAMUSCULAR; INTRAVENOUS
Status: ACTIVE | OUTPATIENT
Start: 2021-02-18 | End: 2021-02-19

## 2021-02-18 RX ORDER — CARBOPROST TROMETHAMINE 250 UG/ML
250 INJECTION, SOLUTION INTRAMUSCULAR AS NEEDED
Status: DISCONTINUED | OUTPATIENT
Start: 2021-02-18 | End: 2021-02-18

## 2021-02-18 RX ORDER — PHYTONADIONE 1 MG/.5ML
INJECTION, EMULSION INTRAMUSCULAR; INTRAVENOUS; SUBCUTANEOUS
Status: DISPENSED
Start: 2021-02-18 | End: 2021-02-19

## 2021-02-18 RX ORDER — DIPHENHYDRAMINE HCL 25 MG
25 CAPSULE ORAL EVERY 4 HOURS PRN
Status: DISCONTINUED | OUTPATIENT
Start: 2021-02-18 | End: 2021-02-20 | Stop reason: HOSPADM

## 2021-02-18 RX ORDER — ACETAMINOPHEN 500 MG
1000 TABLET ORAL ONCE
Status: COMPLETED | OUTPATIENT
Start: 2021-02-18 | End: 2021-02-18

## 2021-02-18 RX ORDER — METHYLERGONOVINE MALEATE 0.2 MG/ML
200 INJECTION INTRAVENOUS ONCE AS NEEDED
Status: DISCONTINUED | OUTPATIENT
Start: 2021-02-18 | End: 2021-02-18

## 2021-02-18 RX ORDER — FENTANYL CITRATE 50 UG/ML
INJECTION, SOLUTION INTRAMUSCULAR; INTRAVENOUS
Status: COMPLETED | OUTPATIENT
Start: 2021-02-18 | End: 2021-02-18

## 2021-02-18 RX ORDER — OXYTOCIN-SODIUM CHLORIDE 0.9% IV SOLN 30 UNIT/500ML 30-0.9/5 UT/ML-%
999 SOLUTION INTRAVENOUS ONCE
Status: COMPLETED | OUTPATIENT
Start: 2021-02-18 | End: 2021-02-18

## 2021-02-18 RX ADMIN — FAMOTIDINE 20 MG: 10 INJECTION INTRAVENOUS at 13:12

## 2021-02-18 RX ADMIN — MORPHINE SULFATE 100 MCG: 1 INJECTION, SOLUTION EPIDURAL; INTRATHECAL; INTRAVENOUS at 13:45

## 2021-02-18 RX ADMIN — PHENYLEPHRINE HYDROCHLORIDE 200 MCG: 10 INJECTION INTRAVENOUS at 14:08

## 2021-02-18 RX ADMIN — OXYTOCIN 999 ML/HR: 10 INJECTION INTRAVENOUS at 14:11

## 2021-02-18 RX ADMIN — PHENYLEPHRINE HYDROCHLORIDE 200 MCG: 10 INJECTION INTRAVENOUS at 13:53

## 2021-02-18 RX ADMIN — ACETAMINOPHEN 1000 MG: 500 TABLET, FILM COATED ORAL at 13:12

## 2021-02-18 RX ADMIN — SODIUM CHLORIDE, POTASSIUM CHLORIDE, SODIUM LACTATE AND CALCIUM CHLORIDE 1000 ML: 600; 310; 30; 20 INJECTION, SOLUTION INTRAVENOUS at 11:12

## 2021-02-18 RX ADMIN — PHENYLEPHRINE HYDROCHLORIDE 100 MCG: 10 INJECTION INTRAVENOUS at 14:25

## 2021-02-18 RX ADMIN — OXYTOCIN 125 ML/HR: 10 INJECTION INTRAVENOUS at 16:10

## 2021-02-18 RX ADMIN — IBUPROFEN 600 MG: 600 TABLET ORAL at 17:06

## 2021-02-18 RX ADMIN — PHENYLEPHRINE HYDROCHLORIDE 200 MCG: 10 INJECTION INTRAVENOUS at 14:23

## 2021-02-18 RX ADMIN — BETAMETHASONE VALERATE: 1.2 OINTMENT TOPICAL at 23:40

## 2021-02-18 RX ADMIN — CEFAZOLIN 3 G: 10 INJECTION, POWDER, FOR SOLUTION INTRAVENOUS at 13:30

## 2021-02-18 RX ADMIN — SODIUM CHLORIDE, POTASSIUM CHLORIDE, SODIUM LACTATE AND CALCIUM CHLORIDE 125 ML/HR: 600; 310; 30; 20 INJECTION, SOLUTION INTRAVENOUS at 12:10

## 2021-02-18 RX ADMIN — PHENYLEPHRINE HYDROCHLORIDE 100 MCG: 10 INJECTION INTRAVENOUS at 13:47

## 2021-02-18 RX ADMIN — Medication: at 23:40

## 2021-02-18 RX ADMIN — ONDANSETRON 4 MG: 2 INJECTION INTRAMUSCULAR; INTRAVENOUS at 13:12

## 2021-02-18 RX ADMIN — FENTANYL CITRATE 15 MCG: 50 INJECTION INTRAMUSCULAR; INTRAVENOUS at 13:45

## 2021-02-18 NOTE — L&D DELIVERY NOTE
[facility]   Section Operative Note    Pre-Operative Dx:   1.  IUP at 39w2d weeks   2. Breech         Postoperative Dx:  Same        Procedure: , Low Transverse    Surgeon: Carol Cueva     Assistant: Dr Thomas   Anesthesia: Spinal                  Findings:                           Amniotic Fluid clear  Uterus normal  Tubes and ovaries normal bilaterally              Infant:           Gender: Viable female  infant     Weight: 3860 g (8 lb 8.2 oz)     Apgars: 8  @ 1 minute /     8  @ 5 minutes                 Indication for C/Section:     Breech               Procedure Details:  The patient was taken to the operating room where  anesthesia was found to be adequate. She was prepped and draped in the usual sterile manner in the dorsal supine position with leftward tilt. A Pfannenstiel incision was made and carried down to the fascia. The fascia was incised in the mid-line and extended transversely with Rebolledo scissors. The fascia was grasped and elevated and  from the underlying rectus muscles superiorly and inferiorly. The peritoneum was identified and entered. Peritoneal incision was extended superiorly and inferiorly with good visualization of the bladder. The bladder blade was inserted and the vesicouterine peritoneum was incised transversely and the bladder flap was created digitally. The bladder blade was reinserted. The  lower uterine segment was incised in a transverse fashion.  The bottom was elevated and delivered through the incision. The remainder of the infant was delivered without difficulty. The umbilical cord was clamped and cut and the infant was handed off the field. Cord ph and cord bloods were obtained. The placenta was removed intact and appeared normal. The uterine incision was closed with running locked sutures of 0 monocryl. The abdominal cavity was irrigated and cleared of all clot and debris. The uterine incision was inspected and noted to be hemostatic.  Rectus muscles were reapproximated in the midline with 0 chromic.  The fascia was closed with 0 PDS in running continuous fashion. The subcutaneous tissue was closed with 3-0 chromic. The skin was closed in subcuticular fashion with 4-0 Monocryl.   Instrument, sponge, and needle counts were correct prior the abdominal closure and at the conclusion of the case. Mother and baby  to recovery room in stable condition.              Complications:     None          Antibiotics: cefazolin (Ancef)                      Carol Cueva MD  2/18/2021  23:59 EST

## 2021-02-18 NOTE — ANESTHESIA PROCEDURE NOTES
Spinal Block      Patient reassessed immediately prior to procedure    Patient location during procedure: OB  Indication:procedure for pain  Performed By  Anesthesiologist: Sonny Wright MD  CRNA: Marielena Lay CRNA  Preanesthetic Checklist  Completed: patient identified, site marked, surgical consent, pre-op evaluation, timeout performed, IV checked, risks and benefits discussed and monitors and equipment checked  Spinal Block Prep:  Patient Position:sitting  Sterile Tech:cap, gloves, mask and sterile barriers  Prep:Chloraprep  Patient Monitoring:blood pressure monitoring and continuous pulse oximetry  Spinal Block Procedure  Approach:midline  Guidance:landmark technique  Location:L4-L5  Needle Type:Pencan  Needle Gauge:24 G  Placement of Spinal needle event:cerebrospinal fluid aspirated  Paresthesia: no  Fluid Appearance:clear  Medications: fentaNYL citrate (PF) (SUBLIMAZE) injection, 15 mcg  Morphine PF injection, 100 mcg  Med Administered at 2/18/2021 1:45 PM   Post Assessment  Patient Tolerance:patient tolerated the procedure well with no apparent complications  Complications no

## 2021-02-18 NOTE — ANESTHESIA POSTPROCEDURE EVALUATION
"Patient: Belem Mcknight    Procedure Summary     Date: 21 Room / Location:  [facility] /  [facility]    Anesthesia Start: 1332 Anesthesia Stop:     Procedure:  SECTION PRIMARY (N/A Abdomen) Diagnosis:     Surgeon: Carol Cueva MD Provider: Kyle, Sonny Castanead MD    Anesthesia Type: spinal ASA Status: 3          Anesthesia Type: spinal    Vitals  Vitals Value Taken Time   /50 21 1701   Temp 36.5 °C (97.7 °F) 21 1510   Pulse 82 21 1710   Resp 16 21 171   SpO2 97 % 21           Post Anesthesia Care and Evaluation    Patient location during evaluation: bedside  Patient participation: complete - patient participated  Level of consciousness: awake and alert  Pain management: adequate  Airway patency: patent  Anesthetic complications: No anesthetic complications    Cardiovascular status: acceptable  Respiratory status: acceptable  Hydration status: acceptable    Comments: /50 (BP Location: Left arm, Patient Position: Lying)   Pulse 82   Temp 36.5 °C (97.7 °F) (Oral)   Resp 16   Ht 167.6 cm (66\")   Wt (!) 140 kg (309 lb)   LMP 2020 (Exact Date)   SpO2 97%   Breastfeeding Unknown   BMI 49.87 kg/m²       "

## 2021-02-18 NOTE — PLAN OF CARE
Problem: Adult Inpatient Plan of Care  Goal: Plan of Care Review  Outcome: Ongoing, Progressing  Flowsheets (Taken 2021 1637)  Progress: improving  Plan of Care Reviewed With: patient  Outcome Summary: Patient delivered via  section at 1409. Mom and baby resting comfortably.  Goal: Patient-Specific Goal (Individualized)  Outcome: Ongoing, Progressing  Flowsheets (Taken 2021 1637)  Patient-Specific Goals (Include Timeframe): pain control  Anxieties, Fears or Concerns: PETER, breastfeeding  Goal: Absence of Hospital-Acquired Illness or Injury  Outcome: Ongoing, Progressing  Intervention: Identify and Manage Fall Risk  Recent Flowsheet Documentation  Taken 2021 1130 by Carmelina Lua RN  Safety Promotion/Fall Prevention: safety round/check completed  Intervention: Prevent and Manage VTE (venous thromboembolism) Risk  Recent Flowsheet Documentation  Taken 2021 1630 by Carmelina Lua RN  VTE Prevention/Management:   bilateral   sequential compression devices on  Taken 2021 1620 by Carmelina Lua RN  VTE Prevention/Management:   bilateral   sequential compression devices on  Taken 2021 1600 by Carmelina Lua RN  VTE Prevention/Management:   bilateral   sequential compression devices on  Taken 2021 1545 by Carmelina Lua RN  VTE Prevention/Management:   bilateral   sequential compression devices on  Taken 2021 1540 by Carmelina Lua RN  VTE Prevention/Management:   bilateral   sequential compression devices on  Taken 2021 1535 by Carmelina Lua RN  VTE Prevention/Management:   bilateral   sequential compression devices on  Taken 2021 1520 by Carmelina Lua RN  VTE Prevention/Management:   bilateral   sequential compression devices on  Taken 2021 1510 by Carmelina Lua RN  VTE Prevention/Management:   bilateral   sequential compression devices on  Goal: Optimal Comfort and Wellbeing  Outcome: Ongoing, Progressing  Intervention: Provide Person-Centered Care  Recent  Flowsheet Documentation  Taken 2021 1130 by Carmelina Lua, RN  Trust Relationship/Rapport:   care explained   choices provided   empathic listening provided   questions answered  Goal: Readiness for Transition of Care  Outcome: Ongoing, Progressing     Problem: Adjustment to Role Transition (Postpartum  Delivery)  Goal: Successful Maternal Role Transition  Outcome: Ongoing, Progressing     Problem: Bleeding (Postpartum  Delivery)  Goal: Hemostasis  Outcome: Ongoing, Progressing     Problem: Infection (Postpartum  Delivery)  Goal: Absence of Infection Signs and Symptoms  Outcome: Ongoing, Progressing     Problem: Pain (Postpartum  Delivery)  Goal: Acceptable Pain Control  Outcome: Ongoing, Progressing     Problem: Postoperative Nausea and Vomiting (Postpartum  Delivery)  Goal: Nausea and Vomiting Relief  Outcome: Ongoing, Progressing     Problem: Postoperative Urinary Retention (Postpartum  Delivery)  Goal: Effective Urinary Elimination  Outcome: Ongoing, Progressing   Goal Outcome Evaluation:  Plan of Care Reviewed With: patient  Progress: improving  Outcome Summary: Patient delivered via  section at 1409. Mom and baby resting comfortably.

## 2021-02-18 NOTE — ANESTHESIA PREPROCEDURE EVALUATION
Anesthesia Evaluation     no history of anesthetic complications:               Airway   Mallampati: II  TM distance: >3 FB  Neck ROM: full  Dental - normal exam     Pulmonary - negative pulmonary ROS and normal exam   Cardiovascular - negative cardio ROS    Rhythm: regular        Neuro/Psych  GI/Hepatic/Renal/Endo    (+) obesity,       Musculoskeletal     Abdominal    Substance History      OB/GYN    (+) Pregnant,         Other                        Anesthesia Plan    ASA 3     spinal   (Discussed with patient SAB and risk of PDPH, the possible need for blood patch in the future, discomfort during placement or delivery, SOA, possible failed SAB and need for GETA and the risks associated with GA and CS, N&V, and itching from intrathecal morphine.  Patient understands and wishes to have SAB for CS.)

## 2021-02-19 LAB
BASOPHILS # BLD AUTO: 0.06 10*3/MM3 (ref 0–0.2)
BASOPHILS NFR BLD AUTO: 0.5 % (ref 0–1.5)
DEPRECATED RDW RBC AUTO: 40.4 FL (ref 37–54)
EOSINOPHIL # BLD AUTO: 0.14 10*3/MM3 (ref 0–0.4)
EOSINOPHIL NFR BLD AUTO: 1.2 % (ref 0.3–6.2)
ERYTHROCYTE [DISTWIDTH] IN BLOOD BY AUTOMATED COUNT: 11.8 % (ref 12.3–15.4)
HCT VFR BLD AUTO: 32.3 % (ref 34–46.6)
HGB BLD-MCNC: 11.4 G/DL (ref 12–15.9)
IMM GRANULOCYTES # BLD AUTO: 0.07 10*3/MM3 (ref 0–0.05)
IMM GRANULOCYTES NFR BLD AUTO: 0.6 % (ref 0–0.5)
LYMPHOCYTES # BLD AUTO: 2.33 10*3/MM3 (ref 0.7–3.1)
LYMPHOCYTES NFR BLD AUTO: 20.1 % (ref 19.6–45.3)
MCH RBC QN AUTO: 33.5 PG (ref 26.6–33)
MCHC RBC AUTO-ENTMCNC: 35.3 G/DL (ref 31.5–35.7)
MCV RBC AUTO: 95 FL (ref 79–97)
MONOCYTES # BLD AUTO: 1.19 10*3/MM3 (ref 0.1–0.9)
MONOCYTES NFR BLD AUTO: 10.2 % (ref 5–12)
NEUTROPHILS NFR BLD AUTO: 67.4 % (ref 42.7–76)
NEUTROPHILS NFR BLD AUTO: 7.83 10*3/MM3 (ref 1.7–7)
NRBC BLD AUTO-RTO: 0 /100 WBC (ref 0–0.2)
PLATELET # BLD AUTO: 175 10*3/MM3 (ref 140–450)
PMV BLD AUTO: 9.9 FL (ref 6–12)
RBC # BLD AUTO: 3.4 10*6/MM3 (ref 3.77–5.28)
WBC # BLD AUTO: 11.62 10*3/MM3 (ref 3.4–10.8)

## 2021-02-19 PROCEDURE — 85025 COMPLETE CBC W/AUTO DIFF WBC: CPT | Performed by: NURSE PRACTITIONER

## 2021-02-19 RX ORDER — POLYETHYLENE GLYCOL 3350 17 G/17G
17 POWDER, FOR SOLUTION ORAL DAILY
Status: DISCONTINUED | OUTPATIENT
Start: 2021-02-19 | End: 2021-02-20 | Stop reason: HOSPADM

## 2021-02-19 RX ORDER — NALOXONE HCL 0.4 MG/ML
0.2 VIAL (ML) INJECTION
Status: DISCONTINUED | OUTPATIENT
Start: 2021-02-19 | End: 2021-02-20 | Stop reason: HOSPADM

## 2021-02-19 RX ORDER — HYDROMORPHONE HYDROCHLORIDE 1 MG/ML
0.25 INJECTION, SOLUTION INTRAMUSCULAR; INTRAVENOUS; SUBCUTANEOUS
Status: ACTIVE | OUTPATIENT
Start: 2021-02-19 | End: 2021-02-20

## 2021-02-19 RX ADMIN — POLYETHYLENE GLYCOL 3350 17 G: 17 POWDER, FOR SOLUTION ORAL at 08:50

## 2021-02-19 RX ADMIN — IBUPROFEN 600 MG: 600 TABLET ORAL at 08:50

## 2021-02-19 RX ADMIN — IBUPROFEN 600 MG: 600 TABLET ORAL at 17:28

## 2021-02-19 RX ADMIN — HYDROCODONE BITARTRATE AND ACETAMINOPHEN 1 TABLET: 7.5; 325 TABLET ORAL at 08:50

## 2021-02-19 RX ADMIN — HYDROCODONE BITARTRATE AND ACETAMINOPHEN 1 TABLET: 7.5; 325 TABLET ORAL at 13:08

## 2021-02-19 RX ADMIN — IBUPROFEN 600 MG: 600 TABLET ORAL at 01:12

## 2021-02-19 RX ADMIN — HYDROCODONE BITARTRATE AND ACETAMINOPHEN 1 TABLET: 7.5; 325 TABLET ORAL at 22:17

## 2021-02-19 RX ADMIN — HYDROCODONE BITARTRATE AND ACETAMINOPHEN 1 TABLET: 7.5; 325 TABLET ORAL at 17:28

## 2021-02-19 NOTE — PLAN OF CARE
Problem: Adult Inpatient Plan of Care  Goal: Plan of Care Review  Outcome: Ongoing, Progressing  Flowsheets (Taken 2021 1651)  Outcome Summary: VSS, bleeding WNL, pain controlled with PO meds. Pt has voided and passed gas, breast feeding well. Wants to d/c tomorrow if baby okay to be d/c.  Goal: Patient-Specific Goal (Individualized)  Outcome: Ongoing, Progressing  Goal: Absence of Hospital-Acquired Illness or Injury  Outcome: Ongoing, Progressing  Intervention: Identify and Manage Fall Risk  Recent Flowsheet Documentation  Taken 2021 1600 by Olivia Castaneda RN  Safety Promotion/Fall Prevention: safety round/check completed  Taken 2021 1409 by Olivia Castaneda RN  Safety Promotion/Fall Prevention: safety round/check completed  Taken 2021 1308 by Olivia Castaneda RN  Safety Promotion/Fall Prevention: safety round/check completed  Taken 2021 1148 by Olivia Castaneda RN  Safety Promotion/Fall Prevention: safety round/check completed  Taken 2021 1053 by Olivia Castaneda RN  Safety Promotion/Fall Prevention: safety round/check completed  Taken 2021 0850 by Olivia Castaneda RN  Safety Promotion/Fall Prevention:   safety round/check completed   nonskid shoes/slippers when out of bed  Goal: Optimal Comfort and Wellbeing  Outcome: Ongoing, Progressing  Goal: Readiness for Transition of Care  Outcome: Ongoing, Progressing     Problem: Adjustment to Role Transition (Postpartum  Delivery)  Goal: Successful Maternal Role Transition  Outcome: Ongoing, Progressing  Intervention: Support Maternal Role Transition  Recent Flowsheet Documentation  Taken 2021 0850 by Olivia Castaneda RN  Supportive Measures:   active listening utilized   decision-making supported   goal setting facilitated  Parent/Child Attachment Promotion:   caring behavior modeled   interaction encouraged   face-to-face positioning promoted   cue recognition promoted   parent/caregiver presence encouraged   participation in care promoted   positive  reinforcement provided   rooming-in promoted   skin-to-skin contact encouraged   strengths emphasized     Problem: Bleeding (Postpartum  Delivery)  Goal: Hemostasis  Outcome: Ongoing, Progressing     Problem: Infection (Postpartum  Delivery)  Goal: Absence of Infection Signs and Symptoms  Outcome: Ongoing, Progressing     Problem: Pain (Postpartum  Delivery)  Goal: Acceptable Pain Control  Outcome: Ongoing, Progressing  Intervention: Prevent or Manage Pain  Recent Flowsheet Documentation  Taken 2021 1600 by Olivia Castaneda RN  Pain Management Interventions: pain management plan reviewed with patient/caregiver  Taken 2021 0850 by Olivia Castaneda RN  Pain Management Interventions:   see MAR   quiet environment facilitated     Problem: Postoperative Nausea and Vomiting (Postpartum  Delivery)  Goal: Nausea and Vomiting Relief  Outcome: Ongoing, Progressing     Problem: Postoperative Urinary Retention (Postpartum  Delivery)  Goal: Effective Urinary Elimination  Outcome: Ongoing, Progressing   Goal Outcome Evaluation:        Outcome Summary: VSS, bleeding WNL, pain controlled with PO meds. Pt has voided and passed gas, breast feeding well. Wants to d/c tomorrow if baby okay to be d/c.

## 2021-02-19 NOTE — PLAN OF CARE
Goal Outcome Evaluation:        Outcome Summary: Pain controlled. VSS. F/C in place to be removed by 0600. Up x1, activity encouraged. Duramorph checks. Bleeding light to moderate. Breastfeeding well.

## 2021-02-19 NOTE — LACTATION NOTE
Lactation Consult Note  Mom wanting assistance with latching baby, reported her nipples are sore from cluster feeding. Educated mom on starting nose to nipple to obtain deep latch. Adjusted baby's latch with chin tug and mom reports that latch feels better. Educated on importance of deep latching, how to know if baby is getting enough and ways to rouse infant. Infant able to latch deeply to nipple, mother denies pain or discomfort, good jaw rotation noted. Mom is able to express easily. Lanolin cream ordered per RN. Encouraged to call LC if needing further assistance.          Evaluation Completed: 2021 22:01 EST  Patient Name: Belem Mcknight  :  1990  MRN:  [id number]     REFERRAL  INFORMATION:                          Date of Referral: 21   Person Making Referral: lactation consultant  Maternal Reason for Referral: breastfeeding currently       DELIVERY HISTORY:        Skin to skin initiation date/time:      Skin to skin end date/time:           MATERNAL ASSESSMENT:  Breast Size Issue: none (21)  Breast Shape: pendulous, Bilateral: (21)  Breast Density: Bilateral:, soft (21)  Areola: Bilateral:, elastic (21)  Nipples: Bilateral:, everted, graspable (21)                INFANT ASSESSMENT:  Information for the patient's :  Kianna Mcknight [[id number]]   No past medical history on file.     Feeding Readiness Cues: eager, rooting (21)      Feeding Tolerance/Success: eager, rooting (21)               Feeding Interventions: latch assistance provided (21)   Nutrition Interventions: lactation consult initiated (21)            Breastfeeding: breastfeeding, bilateral (21)   Infant Positioning: clutch/football (21)         Effective Latch During Feeding: yes (21)   Suck/Swallow Coordination: present (21)   Signs of Milk Transfer: deep jaw excursions noted  (21)       Latch: 2-->grasps breast, tongue down, lips flanged, rhythmic sucking (21)   Audible Swallowin-->a few with stimulation (21)   Type of Nipple: 2-->everted (after stimulation) (21)   Comfort (Breast/Nipple): 2-->soft/nontender (21)   Hold (Positioning): 1-->minimal assist, teach one side, mother does other, staff holds (21)   Latch Score: 8 (21)                    MATERNAL INFANT FEEDING:  Maternal Preparation: hand hygiene (21)  Maternal Emotional State: receptive, relaxed (21)  Infant Positioning: clutch/football (21)   Signs of Milk Transfer: deep jaw excursions noted (21)  Pain with Feeding: no (21)        Comfort Measures Before/During Feeding: latch adjusted, infant position adjusted, maternal position adjusted (21)  Milk Ejection Reflex: present (21)           Latch Assistance: minimal assistance (21)                               EQUIPMENT TYPE:                                 BREAST PUMPING:          LACTATION REFERRALS:

## 2021-02-19 NOTE — LACTATION NOTE
This note was copied from a baby's chart.  Mom reports baby is latching better with less pain now. Discussed STS, feeding cues, baby's output, engorgement management and call for any assistance. Her breast pump is in route to her house.

## 2021-02-20 VITALS
RESPIRATION RATE: 18 BRPM | SYSTOLIC BLOOD PRESSURE: 115 MMHG | HEIGHT: 66 IN | HEART RATE: 96 BPM | OXYGEN SATURATION: 97 % | BODY MASS INDEX: 47.09 KG/M2 | WEIGHT: 293 LBS | TEMPERATURE: 98 F | DIASTOLIC BLOOD PRESSURE: 77 MMHG

## 2021-02-20 RX ORDER — HYDROCODONE BITARTRATE AND ACETAMINOPHEN 7.5; 325 MG/1; MG/1
1 TABLET ORAL EVERY 4 HOURS PRN
Qty: 15 TABLET | Refills: 0 | Status: SHIPPED | OUTPATIENT
Start: 2021-02-20 | End: 2021-02-25

## 2021-02-20 RX ADMIN — SIMETHICONE 80 MG: 80 TABLET, CHEWABLE ORAL at 08:32

## 2021-02-20 RX ADMIN — POLYETHYLENE GLYCOL 3350 17 G: 17 POWDER, FOR SOLUTION ORAL at 08:32

## 2021-02-20 RX ADMIN — SIMETHICONE 80 MG: 80 TABLET, CHEWABLE ORAL at 00:26

## 2021-02-20 RX ADMIN — HYDROCODONE BITARTRATE AND ACETAMINOPHEN 1 TABLET: 7.5; 325 TABLET ORAL at 08:32

## 2021-02-20 RX ADMIN — HYDROCODONE BITARTRATE AND ACETAMINOPHEN 1 TABLET: 7.5; 325 TABLET ORAL at 02:29

## 2021-02-20 RX ADMIN — IBUPROFEN 600 MG: 600 TABLET ORAL at 08:32

## 2021-02-20 RX ADMIN — HYDROCODONE BITARTRATE AND ACETAMINOPHEN 1 TABLET: 7.5; 325 TABLET ORAL at 12:17

## 2021-02-20 NOTE — PLAN OF CARE
Goal Outcome Evaluation:  Plan of Care Reviewed With: patient  Progress: improving  Outcome Summary: vital signs stable. bleeding WNL. patient complains of gas and incisional discomfort- see mar. RUPAL dressing in place with green light blinking.

## 2021-02-20 NOTE — DISCHARGE SUMMARY
[facility]   PROGRESS NOTE    Patient Name: Belem Mcknight  :  1990  MRN:  [id number]      Post-Op Day 2 S/P   Subjective     Patient reports:    Doing well - ready for discharge. Pain well controlled. Tolerating po and   having flatus. Voiding and ambulating without difficulty. Lochia normal.     Wants to go home today. Want to take 2 aleve otc from home for pain. Will also take a few percocet in case.    Objective       Vitals: Vital Signs Range for the last 24 hours  Temperature: Temp:  [97.9 °F (36.6 °C)-98.6 °F (37 °C)] 98 °F (36.7 °C)       BP: BP: ()/(62-77) 115/77   Pulse: Heart Rate:  [85-96] 96                                                         Physical  Exam     Gen: Alert and awake  RRR  CTAB    Abdomen: Soft, ND,  Fundus firm with minimal tenderness    Incision : Covered with wound dressing. No soilage    Extremities: Calves NT bilaterally               Assessment/Plan ]   Assessment:  POD 2  -  Doing well. Stable for discharge.     Plan:    Instructions reviewed - no driving for 1 week and no narcotic pain meds.  no heavy lifting for 6  weeks, pelvic rest for 6 weeks.   Follow up in 6  weeks.   Rx on chart.         Pregnancy            Marta Angulo MD  2021  11:34 EST

## 2021-02-20 NOTE — LACTATION NOTE
P1. Patient is feeding only from left breast due to damage on left areola and left nipple is cracked. Nipple care reviewed. Enc patient to follow up -stick shape-stick shape on release. Has  contact numbers.   Lactation Consult Note    Evaluation Completed: 2021 12:38 EST  Patient Name: Belem Mcknight  :  1990  MRN:  [id number]     REFERRAL  INFORMATION:                          Date of Referral: 21   Person Making Referral: lactation consultant  Maternal Reason for Referral: breastfeeding currently, no prior breastfeeding experience       DELIVERY HISTORY:        Skin to skin initiation date/time:      Skin to skin end date/time:           MATERNAL ASSESSMENT:  Breast Size Issue: none (21 1229)  Breast Shape: pendulous (21 1229)  Breast Density: filling (21 122)  Areola: dense (21 122)  Nipples: everted, graspable, short (21 1229)        Right Nipple Symptoms: cracked, bruised, painful (21 122)       INFANT ASSESSMENT:  Information for the patient's :  Kianna Mcknight [[id number]]   No past medical history on file.     Feeding Readiness Cues: eager, rooting (21 1200)      Feeding Tolerance/Success: adequate pause for breath, coordinated suck/swallow, strong suck (21 1200)      Satiety Cues: infant releases breast, sleeping after feeding (21 1200)         Feeding Interventions: feeding cues monitored, latch assistance provided (21 1200)   Nutrition Interventions: lactation consult initiated (21 1200)            Breastfeeding: breastfeeding, left side only (21 1200)   Infant Positioning: cradle (21 1200)   Breastfeeding Time, Left (min): 20 (21 1200)      Effective Latch During Feeding: yes (21 1200)      Signs of Milk Transfer: deep jaw excursions noted, suck/swallow ratio, transfer present (21 1200)       Latch: 2-->grasps breast, tongue down, lips flanged, rhythmic sucking (21  1200)   Audible Swallowin-->spontaneous and intermittent (24 hrs old) (21 1200)   Type of Nipple: 2-->everted (after stimulation) (21)   Comfort (Breast/Nipple): 1-->filling, red/small blisters/bruises, mild/mod discomfort (21 1200)   Hold (Positioning): 1-->minimal assist, teach one side, mother does other, staff holds (21 1200)   Latch Score: 8 (21)     Infant-Driven Feeding Scales - Readiness: Alert or fussy prior to care. Rooting and/or hands to mouth behavior. Good tone. (21 1200)               MATERNAL INFANT FEEDING:                                                                       EQUIPMENT TYPE:  Breast Pump Type: double electric, personal (21 1229)                              BREAST PUMPING:          LACTATION REFERRALS:  Lactation Referrals: outpatient lactation program (21 1229)

## 2021-02-20 NOTE — LACTATION NOTE
This note was copied from a baby's chart.  Mom c/o of tender and cracked nipples, gel pads given, APNO already in use, education on deep latch given and demonstrated.Encouraged to call LC if needing further assistance

## 2021-02-23 ENCOUNTER — TELEPHONE (OUTPATIENT)
Dept: LACTATION | Facility: HOSPITAL | Age: 31
End: 2021-02-23

## 2021-02-23 NOTE — TELEPHONE ENCOUNTER
D/c lactation call. Breast feeding is going much better and her nipples are healing. Encouraged to call [facility] for any questions or concerns.

## 2022-10-05 ENCOUNTER — TRANSCRIBE ORDERS (OUTPATIENT)
Dept: ULTRASOUND IMAGING | Facility: HOSPITAL | Age: 32
End: 2022-10-05

## 2022-10-05 DIAGNOSIS — E66.01 CLASS 3 SEVERE OBESITY DUE TO EXCESS CALORIES WITHOUT SERIOUS COMORBIDITY WITH BODY MASS INDEX (BMI) OF 50.0 TO 59.9 IN ADULT: Primary | ICD-10-CM

## 2022-10-05 LAB
EXTERNAL HEPATITIS B SURFACE ANTIGEN: NEGATIVE
EXTERNAL HEPATITIS C AB: NONREACTIVE
EXTERNAL RUBELLA QUALITATIVE: NORMAL
EXTERNAL SYPHILIS RPR SCREEN: NORMAL
HIV1 P24 AG SERPL QL IA: NEGATIVE
HIV1 P24 AG SERPL QL IA: NEGATIVE

## 2022-10-24 ENCOUNTER — APPOINTMENT (OUTPATIENT)
Dept: ULTRASOUND IMAGING | Facility: HOSPITAL | Age: 32
End: 2022-10-24

## 2022-11-01 NOTE — PROGRESS NOTES
MATERNAL FETAL MEDICINE Consult Note    Dear Dr Carol Cueva MD:    Thank you for your kind referral of Belem Mcknight.  As you know, she is a 31 y.o.   at 24  1/7 weeks gestation (Estimated Date of Delivery: None noted.). This is a consult.      Her antepartum course is complicated by:  BMI 54  Low lying placenta    Aneuploidy screening: low risk    HPI: Today, she denies headache, blurry vision, RUQ pain. No vaginal bleeding, no contractions.     Review of History:  Past Medical History:   Diagnosis Date   • Back pain    • Bulging lumbar disc     last incident    • Headache    • Placenta previa     first pregnancy   • Tailbone injury     broken possibly twice 16 years old and 21 years old   • Thyroid nodule     enlarged- benign biopsy     Past Surgical History:   Procedure Laterality Date   •  SECTION N/A 2021    Procedure:  SECTION PRIMARY;  Surgeon: Carol Cueva MD;  Location: [facility] LABOR DELIVERY;  Service: Obstetrics/Gynecology;  Laterality: N/A;         Social History     Socioeconomic History   • Marital status:    Tobacco Use   • Smoking status: Never   • Smokeless tobacco: Never   Vaping Use   • Vaping Use: Never used   Substance and Sexual Activity   • Alcohol use: No   • Drug use: No   • Sexual activity: Yes     Partners: Male     Birth control/protection: None     Comment: sig other = PREET     Family History   Problem Relation Age of Onset   • Colon cancer Maternal Grandmother    • Cancer Maternal Grandmother    • Hypertension Maternal Grandfather    • Heart disease Paternal Aunt    • Depression Mother    • OCD Mother    • Neuropathy Mother    • COPD Mother    • Melanoma Mother    • Diabetes Paternal Grandmother    • Heart disease Paternal Grandmother    • Skin cancer Father    • Cancer Paternal Grandfather    • Breast cancer Neg Hx    • Ovarian cancer Neg Hx    • Uterine cancer Neg Hx    • Cystic fibrosis Neg Hx    • Congenital heart  "disease Neg Hx       No Known Allergies   Current Outpatient Medications on File Prior to Visit   Medication Sig Dispense Refill   • calcium carbonate (TUMS) 500 MG chewable tablet Chew 2 tablets Daily.     • Oxymetazoline HCl (NASAL SPRAY SINUS NA) into the nostril(s) as directed by provider.     • Prenatal-FeFum-FA-DHA w/o A (PRENATAL + DHA PO) Take  by mouth.     • Alcohol Swabs (Alcohol Pads) 70 % pads 1 Units 4 (Four) Times a Day. 200 each 11   • glucose monitor monitoring kit Use four times daily to check blood glucose. 1 each 0   • Lancets misc 1 Units 4 (Four) Times a Day. 200 each 11     No current facility-administered medications on file prior to visit.        Past obstetric, gynecological, medical, surgical, family and social history reviewed.  Relevant lab work and imaging reviewed.    Review of systems  Constitutional:  denies fever, chills, malaise.   ENT/Mouth:  denies sore throat, tinnitis  Eyes: denies vision changes/pain  CV:  denies chest pain  Respiratory:  denies cough/SOB  GI:  denies N/V, diarrhea, abdominal pain.    :   denies dysuria  Skin:  denies lesions or pruritis   Neuro:  denies weakness, focal neurologic symptoms    Vitals:    11/04/22 1121   BP: 122/70   BP Location: Right arm   Patient Position: Sitting   Pulse: 99   Temp: 98.4 °F (36.9 °C)   TempSrc: Temporal   Weight: (!) 151 kg (332 lb)   Height: 167.6 cm (66\")       PHYSICAL EXAM   GENERAL: Not in acute distress, AAOx3, pleasant  CARDIO: regular rate and rhythm  PULM: symmetric chest rise, speaking in complete sentences without difficulty  NEURO: awake, alert and oriented to person, place, and time  ABDOMINAL: No fundal tenderness, no rebound or guarding, gravid  EXTREMITIES: no bilateral lower extremity edema/tenderness  SKIN: Warm, well-perfused      ULTRASOUND   Please view full ultrasound note on Imaging tab in ViewPoint.  Cephalic presentation.  Posterior low lying placenta abutting internal os.  MVP 5 cm, which is " normal.   g (56% AC 56%)  Anatomy normal, 3V views, ductal and aortic arch suboptimally viewed.    Cervical length 3.7 cm, which is normal.      ASSESSMENT/COUNSELIN y.o.   at 24  1/7 weeks gestation (Estimated Date of Delivery: None noted.).     -Pregnancy  [ X ] stable  [   ] improving [  ] worsening    Diagnoses and all orders for this visit:    1. Morbid obesity with BMI of 50.0-59.9, adult (HCC) (Primary)  -     [facility]; Future    2. Low-lying placenta without hemorrhage, third trimester     Obesity  Her BMI is 53.  This is risk factor for congenital anomalies, hypertension, diabetes, wound infection with , and DVT, as well as fetal growth abnormalities.   Today a comprehensive anatomy ultrasound was performed which showed no anomalies or markers for aneuploidy, and we were able to get anatomy except a few heart views--because we were able to get 4CH and outflow tracts, I strongly suspect the heart is normal.  Fetal growth is appropriate. Serial ultrasounds every four weeks should be done ensure appropriate fetal growth as fundal height will not be accurate in this patient.   testing is recommended after 32 weeks. We discussed importance of LMWH for any period of immobility and certainly after her CS while in the hospital.       We also reviewed the limitations of prenatal ultrasound.  Ultrasound is not diagnostic for fetal aneuploidy and will not detect all structural abnormalities, even if multiple exams are performed during a given pregnancy. She has a normal cell free DNA.       We discussed baby ASA for pre-eclampsia prevention.  She will begin taking this.      Low lying placenta:   I relayed that there is still a high likelihood that she will not have a low lying placenta on her follow up scan at 4 weeks.    She denies vaginal bleeding and asked about modification of activities.  She has been having intercourse without cramping/bleeding.   There is not much evidence to support pelvic rest with a low lying placenta, especially with a normal cervical length.  We may have to re-visit if this is persistent, but I told her to exercise caution and if she notes cramping or has any spotting/bleeding to do pelvic rest.  She understands.  I anticipate this will be gone by her next visit or at least further from the os.      Recommendations:   - Ultrasound assessment of fetal growth every 4 week for remainder of pregnancy with OB provider.  - ANFS 1-2x weekly from 32 weeks until delivery  - If  delivery occurs, suggest LMWH or heparin VTE chemoprophylaxis post partum until hospital discharge.  - Baby ASA for pre-eclampsia prevention--pt going to get    Follow-up: 4 weeks completion of anatomy, follow-up placenta.      Thank you for the consult and opportunity to care for this patient.  Please feel free to reach out with any questions or concerns.      I spent 30 minutes caring for this patient on this date of service. This time includes time spent by me in the following activities: preparing for the visit, reviewing tests, obtaining and/or reviewing a separately obtained history, performing a medically appropriate examination and/or evaluation, counseling and educating the patient/family/caregiver and independently interpreting results and communicating that information with the patient/family/caregiver with greater than 50% spent in counseling and coordination of care.     Edith Gentile MD FACOG  Maternal Fetal Medicine-[facility]  Office: [phone]  [email or web address]

## 2022-11-04 ENCOUNTER — OFFICE VISIT (OUTPATIENT)
Dept: OBSTETRICS AND GYNECOLOGY | Facility: CLINIC | Age: 32
End: 2022-11-04

## 2022-11-04 ENCOUNTER — HOSPITAL ENCOUNTER (OUTPATIENT)
Dept: ULTRASOUND IMAGING | Facility: HOSPITAL | Age: 32
Discharge: HOME | End: 2022-11-04
Admitting: OBSTETRICS & GYNECOLOGY

## 2022-11-04 VITALS
HEART RATE: 99 BPM | HEIGHT: 66 IN | DIASTOLIC BLOOD PRESSURE: 70 MMHG | SYSTOLIC BLOOD PRESSURE: 122 MMHG | TEMPERATURE: 98.4 F | WEIGHT: 293 LBS | BODY MASS INDEX: 47.09 KG/M2

## 2022-11-04 DIAGNOSIS — O44.43 LOW-LYING PLACENTA WITHOUT HEMORRHAGE, THIRD TRIMESTER: ICD-10-CM

## 2022-11-04 DIAGNOSIS — E66.01 MORBID OBESITY WITH BMI OF 50.0-59.9, ADULT: Primary | ICD-10-CM

## 2022-11-04 DIAGNOSIS — E66.01 CLASS 3 SEVERE OBESITY DUE TO EXCESS CALORIES WITHOUT SERIOUS COMORBIDITY WITH BODY MASS INDEX (BMI) OF 50.0 TO 59.9 IN ADULT: ICD-10-CM

## 2022-11-04 PROCEDURE — 76811 OB US DETAILED SNGL FETUS: CPT

## 2022-11-04 PROCEDURE — 76817 TRANSVAGINAL US OBSTETRIC: CPT

## 2022-11-04 PROCEDURE — 99214 OFFICE O/P EST MOD 30 MIN: CPT | Performed by: OBSTETRICS & GYNECOLOGY

## 2022-11-04 PROCEDURE — 76811 OB US DETAILED SNGL FETUS: CPT | Performed by: OBSTETRICS & GYNECOLOGY

## 2022-11-04 PROCEDURE — 76817 TRANSVAGINAL US OBSTETRIC: CPT | Performed by: OBSTETRICS & GYNECOLOGY

## 2022-11-04 NOTE — PROGRESS NOTES
Pt reports that she is doing well and denies vaginal bleeding, cramping, contractions or LOF at this time. Reports active fetal movement. Notes occasional discomfort in her back when standing for prolonged periods of time. Denies HA, visual changes or epigastric pain. Next OB appointment scheduled for today after MFM appointment.     Vitals:    11/04/22 1121   BP: 122/70   Pulse: 99   Temp: 98.4 °F (36.9 °C)

## 2022-11-17 ENCOUNTER — TRANSCRIBE ORDERS (OUTPATIENT)
Dept: ULTRASOUND IMAGING | Facility: HOSPITAL | Age: 32
End: 2022-11-17

## 2022-11-17 DIAGNOSIS — E66.01 CLASS 3 SEVERE OBESITY DUE TO EXCESS CALORIES WITHOUT SERIOUS COMORBIDITY WITH BODY MASS INDEX (BMI) OF 50.0 TO 59.9 IN ADULT: Primary | ICD-10-CM

## 2022-11-18 ENCOUNTER — HOSPITAL ENCOUNTER (OUTPATIENT)
Dept: ULTRASOUND IMAGING | Facility: HOSPITAL | Age: 32
Discharge: HOME | End: 2022-11-18

## 2022-11-18 ENCOUNTER — TELEPHONE (OUTPATIENT)
Dept: OBSTETRICS AND GYNECOLOGY | Facility: CLINIC | Age: 32
End: 2022-11-18

## 2022-11-18 ENCOUNTER — OFFICE VISIT (OUTPATIENT)
Dept: OBSTETRICS AND GYNECOLOGY | Facility: CLINIC | Age: 32
End: 2022-11-18

## 2022-11-18 ENCOUNTER — LAB (OUTPATIENT)
Dept: LAB | Facility: HOSPITAL | Age: 32
End: 2022-11-18

## 2022-11-18 VITALS
WEIGHT: 293 LBS | DIASTOLIC BLOOD PRESSURE: 68 MMHG | TEMPERATURE: 98.4 F | HEART RATE: 102 BPM | BODY MASS INDEX: 54.07 KG/M2 | SYSTOLIC BLOOD PRESSURE: 137 MMHG

## 2022-11-18 DIAGNOSIS — O44.43 LOW-LYING PLACENTA WITHOUT HEMORRHAGE, THIRD TRIMESTER: ICD-10-CM

## 2022-11-18 DIAGNOSIS — E66.01 CLASS 3 SEVERE OBESITY DUE TO EXCESS CALORIES WITHOUT SERIOUS COMORBIDITY WITH BODY MASS INDEX (BMI) OF 50.0 TO 59.9 IN ADULT: ICD-10-CM

## 2022-11-18 LAB
ALBUMIN SERPL-MCNC: 3.7 G/DL (ref 3.5–5.2)
ALBUMIN/GLOB SERPL: 1.2 G/DL
ALP SERPL-CCNC: 104 U/L (ref 39–117)
ALT SERPL W P-5'-P-CCNC: 13 U/L (ref 1–33)
ANION GAP SERPL CALCULATED.3IONS-SCNC: 10 MMOL/L (ref 5–15)
AST SERPL-CCNC: 18 U/L (ref 1–32)
BASOPHILS # BLD AUTO: 0.04 10*3/MM3 (ref 0–0.2)
BASOPHILS NFR BLD AUTO: 0.3 % (ref 0–1.5)
BILIRUB SERPL-MCNC: 0.2 MG/DL (ref 0–1.2)
BUN SERPL-MCNC: 6 MG/DL (ref 6–20)
BUN/CREAT SERPL: 14 (ref 7–25)
CALCIUM SPEC-SCNC: 9.2 MG/DL (ref 8.6–10.5)
CHLORIDE SERPL-SCNC: 104 MMOL/L (ref 98–107)
CO2 SERPL-SCNC: 24 MMOL/L (ref 22–29)
CREAT SERPL-MCNC: 0.43 MG/DL (ref 0.57–1)
CREAT UR-MCNC: 127.3 MG/DL
DEPRECATED RDW RBC AUTO: 40.8 FL (ref 37–54)
EGFRCR SERPLBLD CKD-EPI 2021: 131.9 ML/MIN/1.73
EOSINOPHIL # BLD AUTO: 0.17 10*3/MM3 (ref 0–0.4)
EOSINOPHIL NFR BLD AUTO: 1.2 % (ref 0.3–6.2)
ERYTHROCYTE [DISTWIDTH] IN BLOOD BY AUTOMATED COUNT: 11.8 % (ref 12.3–15.4)
GLOBULIN UR ELPH-MCNC: 3 GM/DL
GLUCOSE SERPL-MCNC: 107 MG/DL (ref 65–99)
HCT VFR BLD AUTO: 35.5 % (ref 34–46.6)
HGB BLD-MCNC: 12.5 G/DL (ref 12–15.9)
IMM GRANULOCYTES # BLD AUTO: 0.09 10*3/MM3 (ref 0–0.05)
IMM GRANULOCYTES NFR BLD AUTO: 0.6 % (ref 0–0.5)
LDH SERPL-CCNC: 253 U/L (ref 135–214)
LYMPHOCYTES # BLD AUTO: 1.91 10*3/MM3 (ref 0.7–3.1)
LYMPHOCYTES NFR BLD AUTO: 13.7 % (ref 19.6–45.3)
MCH RBC QN AUTO: 33.2 PG (ref 26.6–33)
MCHC RBC AUTO-ENTMCNC: 35.2 G/DL (ref 31.5–35.7)
MCV RBC AUTO: 94.2 FL (ref 79–97)
MONOCYTES # BLD AUTO: 0.96 10*3/MM3 (ref 0.1–0.9)
MONOCYTES NFR BLD AUTO: 6.9 % (ref 5–12)
NEUTROPHILS NFR BLD AUTO: 10.82 10*3/MM3 (ref 1.7–7)
NEUTROPHILS NFR BLD AUTO: 77.3 % (ref 42.7–76)
NRBC BLD AUTO-RTO: 0 /100 WBC (ref 0–0.2)
PLATELET # BLD AUTO: 268 10*3/MM3 (ref 140–450)
PMV BLD AUTO: 9.4 FL (ref 6–12)
POTASSIUM SERPL-SCNC: 3.9 MMOL/L (ref 3.5–5.2)
PROT ?TM UR-MCNC: 14.7 MG/DL
PROT SERPL-MCNC: 6.7 G/DL (ref 6–8.5)
PROT/CREAT UR: 115.5 MG/G CREA (ref 0–200)
RBC # BLD AUTO: 3.77 10*6/MM3 (ref 3.77–5.28)
SODIUM SERPL-SCNC: 138 MMOL/L (ref 136–145)
URATE SERPL-MCNC: 4.1 MG/DL (ref 2.4–5.7)
WBC NRBC COR # BLD: 13.99 10*3/MM3 (ref 3.4–10.8)

## 2022-11-18 PROCEDURE — 36415 COLL VENOUS BLD VENIPUNCTURE: CPT

## 2022-11-18 PROCEDURE — 80053 COMPREHEN METABOLIC PANEL: CPT

## 2022-11-18 PROCEDURE — 82570 ASSAY OF URINE CREATININE: CPT

## 2022-11-18 PROCEDURE — 83615 LACTATE (LD) (LDH) ENZYME: CPT

## 2022-11-18 PROCEDURE — 76816 OB US FOLLOW-UP PER FETUS: CPT

## 2022-11-18 PROCEDURE — 84550 ASSAY OF BLOOD/URIC ACID: CPT

## 2022-11-18 PROCEDURE — 85025 COMPLETE CBC W/AUTO DIFF WBC: CPT

## 2022-11-18 PROCEDURE — 84156 ASSAY OF PROTEIN URINE: CPT

## 2022-11-18 PROCEDURE — 99214 OFFICE O/P EST MOD 30 MIN: CPT | Performed by: OBSTETRICS & GYNECOLOGY

## 2022-11-18 PROCEDURE — 76816 OB US FOLLOW-UP PER FETUS: CPT | Performed by: OBSTETRICS & GYNECOLOGY

## 2022-11-18 RX ORDER — ADHESIVE BANDAGE 3/4"
1 BANDAGE TOPICAL DAILY
Qty: 1 EACH | Refills: 0 | Status: SHIPPED | OUTPATIENT
Start: 2022-11-18 | End: 2023-03-29

## 2022-11-18 RX ORDER — ASPIRIN 81 MG/1
81 TABLET ORAL DAILY
COMMUNITY
End: 2023-02-18 | Stop reason: HOSPADM

## 2022-11-18 NOTE — TELEPHONE ENCOUNTER
Patient is calling and would like to transfer to your care. She is currently 26 wks and has been being seen at [facility].    Are you able to take this patient under your care?    Please advise,   Angella

## 2022-11-18 NOTE — TELEPHONE ENCOUNTER
Left pt VM. Dr. Villa ok'd pt transfer, able to be scheduled 11/23 at 2:30. Waiting for patient to return call.     Angella

## 2022-11-18 NOTE — PROGRESS NOTES
MATERNAL FETAL MEDICINE Consult Note    Dear Dr Carol Cueva MD:    Thank you for your kind referral of Belem Mcknight.  As you know, she is a 31 y.o.   at 26  1/7 weeks gestation (Estimated Date of Delivery: None noted.). This is a consult.      Her antepartum course is complicated by:  BMI 54  Low lying placenta    Aneuploidy screening: low risk    HPI: Today, she denies headache, blurry vision, RUQ pain. No vaginal bleeding, no contractions.     Review of History:  Past Medical History:   Diagnosis Date   • Back pain    • Bulging lumbar disc     last incident    • Headache    • Placenta previa     first pregnancy   • Tailbone injury     broken possibly twice 16 years old and 21 years old   • Thyroid nodule     enlarged- benign biopsy     Past Surgical History:   Procedure Laterality Date   •  SECTION N/A 2021    Procedure:  SECTION PRIMARY;  Surgeon: Carol Cueva MD;  Location: [facility] LABOR DELIVERY;  Service: Obstetrics/Gynecology;  Laterality: N/A;         Social History     Socioeconomic History   • Marital status:    Tobacco Use   • Smoking status: Never   • Smokeless tobacco: Never   Vaping Use   • Vaping Use: Never used   Substance and Sexual Activity   • Alcohol use: No   • Drug use: No   • Sexual activity: Yes     Partners: Male     Birth control/protection: None     Comment: sig other = PREET     Family History   Problem Relation Age of Onset   • Colon cancer Maternal Grandmother    • Cancer Maternal Grandmother    • Hypertension Maternal Grandfather    • Heart disease Paternal Aunt    • Depression Mother    • OCD Mother    • Neuropathy Mother    • COPD Mother    • Melanoma Mother    • Diabetes Paternal Grandmother    • Heart disease Paternal Grandmother    • Skin cancer Father    • Cancer Paternal Grandfather    • Breast cancer Neg Hx    • Ovarian cancer Neg Hx    • Uterine cancer Neg Hx    • Cystic fibrosis Neg Hx    • Congenital heart  disease Neg Hx       No Known Allergies   Current Outpatient Medications on File Prior to Visit   Medication Sig Dispense Refill   • Alcohol Swabs (Alcohol Pads) 70 % pads 1 Units 4 (Four) Times a Day. 200 each 11   • calcium carbonate (TUMS) 500 MG chewable tablet Chew 2 tablets Daily.     • glucose monitor monitoring kit Use four times daily to check blood glucose. 1 each 0   • Lancets misc 1 Units 4 (Four) Times a Day. 200 each 11   • Prenatal-FeFum-FA-DHA w/o A (PRENATAL + DHA PO) Take  by mouth.     • aspirin 81 MG EC tablet Take 81 mg by mouth Daily.     • Oxymetazoline HCl (NASAL SPRAY SINUS NA) into the nostril(s) as directed by provider.       No current facility-administered medications on file prior to visit.        Past obstetric, gynecological, medical, surgical, family and social history reviewed.  Relevant lab work and imaging reviewed.    Review of systems  Constitutional:  denies fever, chills, malaise.   ENT/Mouth:  denies sore throat, tinnitis  Eyes: denies vision changes/pain  CV:  denies chest pain  Respiratory:  denies cough/SOB  GI:  denies N/V, diarrhea, abdominal pain.    :   denies dysuria  Skin:  denies lesions or pruritis   Neuro:  denies weakness, focal neurologic symptoms    Vitals:    11/18/22 1301 11/18/22 1304   BP: 142/70 137/68   BP Location: Right arm Left arm   Patient Position: Sitting Sitting   Pulse: 106 102   Temp: 98.4 °F (36.9 °C)    TempSrc: Oral    Weight: (!) 152 kg (335 lb)        PHYSICAL EXAM   GENERAL: Not in acute distress, AAOx3, pleasant  CARDIO: regular rate and rhythm  PULM: symmetric chest rise, speaking in complete sentences without difficulty  NEURO: awake, alert and oriented to person, place, and time  ABDOMINAL: No fundal tenderness, no rebound or guarding, gravid  EXTREMITIES: no bilateral lower extremity edema/tenderness  SKIN: Warm, well-perfused      ULTRASOUND   Please view full ultrasound note on Imaging tab in ViewPoint.  Cephalic  "presentation.  Posterior placenta measuring 2.9 cm from internal os, low lying is resolved.  SERGO 14.3 cm, which is normal.    Follow up anatomy normal.    ASSESSMENT/COUNSELIN y.o.   at 26  1/7 weeks gestation (Estimated Date of Delivery: None noted.).     -Pregnancy  [ X ] stable  [   ] improving [  ] worsening    Diagnoses and all orders for this visit:    1. BMI 40.0-44.9, adult (HCC) (Primary)  -     CBC & Differential; Future  -     Comprehensive Metabolic Panel; Future  -     Uric Acid; Future  -     Lactate Dehydrogenase; Future  -     Protein / Creatinine Ratio, Urine - Urine, Clean Catch; Future    2. Low-lying placenta without hemorrhage, third trimester  -     CBC & Differential; Future  -     Comprehensive Metabolic Panel; Future  -     Uric Acid; Future  -     Lactate Dehydrogenase; Future  -     Protein / Creatinine Ratio, Urine - Urine, Clean Catch; Future    Other orders  -     Blood Pressure Monitoring (Blood Pressure Cuff) misc; 1 Units Daily. Check BP once daily  Dispense: 1 each; Refill: 0         Obesity   We also reviewed the limitations of prenatal ultrasound.  Ultrasound is not diagnostic for fetal aneuploidy and will not detect all structural abnormalities, even if multiple exams are performed during a given pregnancy. She has a normal cell free DNA.       We discussed baby ASA for pre-eclampsia prevention.  She will begin taking this.  Recommend continued serial growth exams.        Low lying placenta:   Resolved    Initial elevated BP:  Repeat normal.  Has had this periodically this pregnancy.  Recommended ambulatory monitoring and sent for PIH labs.  If has another, would consider her GHTN. She is between offices right now as she lost insurance and is switching providers because of this. I will see her in 4 weeks and she kept her appt in 2 weeks with Dr. Cueva in case she is not able to get in before them.  She has a \"white coat syndrome\" diagnosis from several years " ago from what looks like a PCP--so I anticipate she probably has some underlying CHTN.  She will get PIH labs and P/C today.    We will monitor closely and she is asymptomatic today--I encouraged her to have a low threshold to come in and keep an eye on symptoms.      Recommendations:   - Ultrasound assessment of fetal growth every 4 week for remainder of pregnancy with OB provider.  - ANFS 1-2x weekly from 32 weeks until delivery  - If  delivery occurs, suggest LMWH or heparin VTE chemoprophylaxis post partum until hospital discharge.  - Baby ASA for pre-eclampsia prevention--pt going to get  -Ambulatory BP monitoring and preE labs today.  Pt if she has another elevated BP will meet criteria for GHTN and will need twice weekly monitoring for that.  We will call her Monday to see how that is going and check on her since she is between providers and trying to get in with [facility] right now.      Follow-up: 4 weeks if not with another provider to monitor BP--otherwise okay for pt to cancel as long as no additional concerns.     Thank you for the consult and opportunity to care for this patient.  Please feel free to reach out with any questions or concerns.      I spent 30 minutes caring for this patient on this date of service. This time includes time spent by me in the following activities: preparing for the visit, reviewing tests, obtaining and/or reviewing a separately obtained history, performing a medically appropriate examination and/or evaluation, counseling and educating the patient/family/caregiver and independently interpreting results and communicating that information with the patient/family/caregiver with greater than 50% spent in counseling and coordination of care.     Edith Gentile MD FACOG  Maternal Fetal Medicine-[facility]  Office: [phone]  [email or web address]

## 2022-11-18 NOTE — PROGRESS NOTES
Pt reports that she is doing well and denies vaginal bleeding, cramping, contractions or LOF at this time. Reports active fetal movement. Denies HA, visual changes or epigastric pain. Pt waiting to scheduled appointment with OB, needing to transfer OB offices d/t insurance.     Vitals:    11/18/22 1304   BP: 137/68   Pulse: 102   Temp:

## 2022-11-21 ENCOUNTER — APPOINTMENT (OUTPATIENT)
Dept: ULTRASOUND IMAGING | Facility: HOSPITAL | Age: 32
End: 2022-11-21

## 2022-11-23 ENCOUNTER — INITIAL PRENATAL (OUTPATIENT)
Dept: OBSTETRICS AND GYNECOLOGY | Facility: CLINIC | Age: 32
End: 2022-11-23

## 2022-11-23 DIAGNOSIS — Z3A.26 26 WEEKS GESTATION OF PREGNANCY: Primary | ICD-10-CM

## 2022-11-23 DIAGNOSIS — Z34.92 PRENATAL CARE IN SECOND TRIMESTER: ICD-10-CM

## 2022-11-23 DIAGNOSIS — E66.01 MORBID OBESITY WITH BMI OF 50.0-59.9, ADULT: ICD-10-CM

## 2022-11-23 DIAGNOSIS — Z98.891 HISTORY OF CESAREAN DELIVERY: ICD-10-CM

## 2022-11-23 LAB
GLUCOSE UR STRIP-MCNC: NEGATIVE MG/DL
PROT UR STRIP-MCNC: NEGATIVE MG/DL

## 2022-11-23 PROCEDURE — 99214 OFFICE O/P EST MOD 30 MIN: CPT | Performed by: STUDENT IN AN ORGANIZED HEALTH CARE EDUCATION/TRAINING PROGRAM

## 2022-11-23 NOTE — PROGRESS NOTES
Initial ob visit     CC- Here for care of pregnancy     Belem Mcknight is being seen today for her first obstetrical visit.  She is a 31 y.o.  at 26w6d gestation by 8 week ultrasound. She is transferring care from [facility] due to insurance change.     OB History    Para Term  AB Living   2 1 1     1   SAB IAB Ectopic Molar Multiple Live Births           0 1      # Outcome Date GA Lbr Chong/2nd Weight Sex Delivery Anes PTL Lv   2 Current            1 Term 21 39w2d  3860 g (8 lb 8.2 oz) F CS-LTranv Spinal N HOLDEN      Birth Comments: Panda 3     Current obstetric complaints : She reports doing well today and has no complaints. Denies vaginal bleeding, leakage of fluid, or contractions. She reports active fetal movement.  Prior obstetric issues, potential pregnancy concerns:    G1- History of  section at 39 weeks for breech presentation on 21 complicated by low lying placenta, obesity, hypertension, and glucose intolerance.    G2- Current   Family history of genetic issues (includes FOB): denies  Prior infections concerning in pregnancy (Rash, fever in last 2 weeks): denies  Varicella Hx - reports chicken pox as a child.  Prior testing for Cystic Fibrosis Carrier or Sickle Cell Trait- n/a   Prepregnancy BMI - Body mass index is 21.47 kg/m².  Hx of HSV for patient or partner : denies     Past Medical History:   Diagnosis Date   • Back pain    • Bulging lumbar disc     last incident    • Gestational hypertension    • Headache    • Placenta previa     first pregnancy   • Tailbone injury     broken possibly twice 16 years old and 21 years old   • Thyroid nodule     enlarged- benign biopsy       Past Surgical History:   Procedure Laterality Date   •  SECTION N/A 2021    Procedure:  SECTION PRIMARY;  Surgeon: Carol Cueva MD;  Location: [facility] LABOR DELIVERY;  Service: Obstetrics/Gynecology;  Laterality: N/A;         Current Outpatient  Medications:   •  aspirin 81 MG EC tablet, Take 81 mg by mouth Daily., Disp: , Rfl:   •  calcium carbonate (TUMS) 500 MG chewable tablet, Chew 2 tablets Daily., Disp: , Rfl:   •  Prenatal-FeFum-FA-DHA w/o A (PRENATAL + DHA PO), Take  by mouth., Disp: , Rfl:   •  Blood Pressure Monitoring (Blood Pressure Cuff) misc, 1 Units Daily. Check BP once daily, Disp: 1 each, Rfl: 0  •  glucose monitor monitoring kit, Use four times daily to check blood glucose., Disp: 1 each, Rfl: 0  •  Oxymetazoline HCl (NASAL SPRAY SINUS NA), into the nostril(s) as directed by provider., Disp: , Rfl:     No Known Allergies    Social History     Socioeconomic History   • Marital status:    Tobacco Use   • Smoking status: Never   • Smokeless tobacco: Never   Vaping Use   • Vaping Use: Never used   Substance and Sexual Activity   • Alcohol use: No   • Drug use: No   • Sexual activity: Yes     Partners: Male     Birth control/protection: None     Comment: sig other = PREET       Family History   Problem Relation Age of Onset   • Colon cancer Maternal Grandmother    • Cancer Maternal Grandmother    • Hypertension Maternal Grandfather    • Heart disease Paternal Aunt    • Depression Mother    • OCD Mother    • Neuropathy Mother    • COPD Mother    • Melanoma Mother    • Diabetes Paternal Grandmother    • Heart disease Paternal Grandmother    • Skin cancer Father    • Cancer Paternal Grandfather    • Breast cancer Neg Hx    • Ovarian cancer Neg Hx    • Uterine cancer Neg Hx    • Cystic fibrosis Neg Hx    • Congenital heart disease Neg Hx        Review of systems     Constitutional : Nausea, fatigue -negative    : Vaginal bleeding, cramping- negative   Breast Tenderness : negative   All systems reviewed and negative.     Review of Systems       Objective    /70   Wt 60.3 kg (133 lb)   LMP  (LMP Unknown)   Breastfeeding Unknown   BMI 21.47 kg/m²  333 lb       General Appearance:    Alert, cooperative, in no acute distress   Head:     Normocephalic, without obvious abnormality, atraumatic   Eyes:            Lids and lashes normal, conjunctivae and sclerae normal, no   icterus, no pallor, corneas clear   Ears:    Ears appear intact with no abnormalities noted   Neck:   No adenopathy, supple, trachea midline   Lungs:     No increased work of breathing, regular respirations    Abdomen:     Gravid,soft, non-tender, non-distended, no guarding, no rebound tenderness   Extremities:   Moves all extremities well, no edema, no cyanosis, no              redness   Skin:   No bleeding, bruising or rash   Neurologic:   Sensation intact, A&O times 3      Assessment & Plan     1) Pregnancy at 26w6d  2) Prenatal care in second trimester  3) Morbid obesity 54 - on ASA 81 mg daily until 36 weeks   4) History of C/S x 1- desires repeat  section      Reviewed prenatal record from previous OB office and M records.    Patient will schedule 1h GTT and CBC on 22. Orders are in.   Discussed recommendations per M for  testing starting at 32 weeks with weekly BPPs, serial growth scans Q 4 weeks.   Patient was to have scheduled repeat  section on 23 and will place case request for that day.   Activity recommendation : 150 minutes/week of moderate intensity aerobic activity unless we limit for bleeding, hypertension or other pregnancy complication   Patient is on Prenatal vitamins  Problem list reviewed and updated.  Reviewed routine prenatal care with the office to including routine prenatal care and delivery hospital is [facility].   All questions answered.   Follow up in 1 weeks for prenatal visit.     Joya Villa MD

## 2022-11-25 PROBLEM — O44.03 PLACENTA PREVIA, THIRD TRIMESTER: Status: RESOLVED | Noted: 2020-10-26 | Resolved: 2022-11-25

## 2022-11-25 PROBLEM — Z98.891 HISTORY OF CESAREAN DELIVERY: Status: ACTIVE | Noted: 2022-11-25

## 2022-12-01 ENCOUNTER — APPOINTMENT (OUTPATIENT)
Dept: ULTRASOUND IMAGING | Facility: HOSPITAL | Age: 32
End: 2022-12-01

## 2022-12-01 ENCOUNTER — ROUTINE PRENATAL (OUTPATIENT)
Dept: OBSTETRICS AND GYNECOLOGY | Facility: CLINIC | Age: 32
End: 2022-12-01

## 2022-12-01 VITALS — SYSTOLIC BLOOD PRESSURE: 133 MMHG | WEIGHT: 293 LBS | BODY MASS INDEX: 53.91 KG/M2 | DIASTOLIC BLOOD PRESSURE: 78 MMHG

## 2022-12-01 DIAGNOSIS — Z34.83 MULTIGRAVIDA IN THIRD TRIMESTER: Primary | ICD-10-CM

## 2022-12-01 DIAGNOSIS — O99.210 OBESITY IN PREGNANCY, ANTEPARTUM: ICD-10-CM

## 2022-12-01 DIAGNOSIS — Z98.891 PREVIOUS CESAREAN SECTION: ICD-10-CM

## 2022-12-01 LAB
GLUCOSE UR STRIP-MCNC: NEGATIVE MG/DL
PROT UR STRIP-MCNC: NEGATIVE MG/DL

## 2022-12-01 PROCEDURE — 99214 OFFICE O/P EST MOD 30 MIN: CPT | Performed by: OBSTETRICS & GYNECOLOGY

## 2022-12-01 RX ORDER — COVID-19 ANTIGEN TEST
KIT MISCELLANEOUS
COMMUNITY
Start: 2022-11-09 | End: 2023-02-18 | Stop reason: HOSPADM

## 2022-12-01 NOTE — PROGRESS NOTES
Cc:  Pregnancy follow up.  No complaints.  Good FM.  No bleeding or spotting.  BP noted  Patient is doing her GTT1 today with CBC.  IUP at 28 weeks with obesity in pregnancy, previous .  - Obesity.  Glucola today.  Sonogram for growth in 4 weeks.  -  Previously received flu vaccine.

## 2022-12-16 ENCOUNTER — APPOINTMENT (OUTPATIENT)
Dept: ULTRASOUND IMAGING | Facility: HOSPITAL | Age: 32
End: 2022-12-16

## 2022-12-21 ENCOUNTER — ROUTINE PRENATAL (OUTPATIENT)
Dept: OBSTETRICS AND GYNECOLOGY | Facility: CLINIC | Age: 32
End: 2022-12-21
Payer: COMMERCIAL

## 2022-12-21 VITALS — DIASTOLIC BLOOD PRESSURE: 76 MMHG | WEIGHT: 293 LBS | BODY MASS INDEX: 53.26 KG/M2 | SYSTOLIC BLOOD PRESSURE: 114 MMHG

## 2022-12-21 DIAGNOSIS — R34 OLIGOURIA: ICD-10-CM

## 2022-12-21 DIAGNOSIS — E66.01 MORBID OBESITY WITH BMI OF 50.0-59.9, ADULT: ICD-10-CM

## 2022-12-21 DIAGNOSIS — Z3A.30 30 WEEKS GESTATION OF PREGNANCY: Primary | ICD-10-CM

## 2022-12-21 DIAGNOSIS — Z98.891 HISTORY OF C-SECTION: ICD-10-CM

## 2022-12-21 DIAGNOSIS — Z34.93 PRENATAL CARE IN THIRD TRIMESTER: ICD-10-CM

## 2022-12-21 LAB
GLUCOSE UR STRIP-MCNC: NEGATIVE MG/DL
PROT UR STRIP-MCNC: ABNORMAL MG/DL

## 2022-12-21 PROCEDURE — 99213 OFFICE O/P EST LOW 20 MIN: CPT | Performed by: STUDENT IN AN ORGANIZED HEALTH CARE EDUCATION/TRAINING PROGRAM

## 2022-12-21 NOTE — PROGRESS NOTES
Chief Complaint   Patient presents with   • Routine Prenatal Visit      Belem Mcknight is a 32 y.o.  at 30w6d who presents for routine prenatal visit. She reports doing well but is concerned that she is having decreased urine output despite drinking normal amounts of fluid. She feels that when she voids, little urine comes out.  Denies vaginal bleeding, cramping, contractions, LOF. She reports active fetal movement.     /76   Wt (!) 150 kg (330 lb)   LMP  (LMP Unknown)   BMI 53.26 kg/m²    Gen: well appearing, NAD   Abd: gravid, nontender  Ext: no lower extremity edema  See OB Flowsheet    ASSESSMENT:   1. IUP at 30w6d   2. Prenatal care in third trimester  3. Morbid obesity- BMI 53.75 - on ASA 81 mg daily  4. History of C/S x 1- repeat C/S scheduled on 22   5. Oliguria     PLAN:  Problem list reviewed and updated.  Reviewed expectations of this stage of pregnancy.   Third trimester precautions reviewed including labor signs, monitoring fetal movements.   Discussed ultrasound results that demonstrate normal fetal growth with EFW 1931 g (EFW:69%ile, AC:79%ile), normal SERGO, and cephalic presentation. Will continue serial growth ultrasound Q 4 weeks and  testing starting at 34 weeks with weekly BPPs due to morbid obesity.   Will obtain urine culture to rule out UTI and check BMP to evaluate renal function given oliguria.   Return in about 2 weeks (around 2023) for prenatal visit .    Patient Active Problem List    Diagnosis Date Noted   • History of  delivery 2022   • Pregnancy 2021   • Abnormal glucose tolerance test (GTT) during pregnancy, antepartum 2020   • Supervision of normal first pregnancy, antepartum 2020   • Class 3 severe obesity due to excess calories without serious comorbidity in adult (HCC) 2019   • Hyperglycemia 2019   • Elevated blood pressure reading in office with white coat syndrome, without diagnosis of hypertension  03/05/2019       Orders Placed This Encounter   Procedures   • Urine Culture - Urine, Urine, Clean Catch   • Basic Metabolic Panel     Order Specific Question:   Release to patient     Answer:   Routine Release   • POC Urinalysis Dipstick     This is an external result entered through the Results Console.     Order Specific Question:   Release to patient     Answer:   Routine Release     Joya Villa MD

## 2022-12-22 LAB
BUN SERPL-MCNC: 5 MG/DL (ref 6–20)
BUN/CREAT SERPL: 9.8 (ref 7–25)
CALCIUM SERPL-MCNC: 9.2 MG/DL (ref 8.6–10.5)
CHLORIDE SERPL-SCNC: 105 MMOL/L (ref 98–107)
CO2 SERPL-SCNC: 26.4 MMOL/L (ref 22–29)
CREAT SERPL-MCNC: 0.51 MG/DL (ref 0.57–1)
EGFRCR SERPLBLD CKD-EPI 2021: 125.8 ML/MIN/1.73
GLUCOSE SERPL-MCNC: 95 MG/DL (ref 65–99)
POTASSIUM SERPL-SCNC: 3.9 MMOL/L (ref 3.5–5.2)
SODIUM SERPL-SCNC: 139 MMOL/L (ref 136–145)

## 2022-12-23 LAB
BACTERIA UR CULT: NORMAL
BACTERIA UR CULT: NORMAL

## 2023-01-04 ENCOUNTER — ROUTINE PRENATAL (OUTPATIENT)
Dept: OBSTETRICS AND GYNECOLOGY | Facility: CLINIC | Age: 33
End: 2023-01-04
Payer: MEDICAID

## 2023-01-04 VITALS — DIASTOLIC BLOOD PRESSURE: 77 MMHG | SYSTOLIC BLOOD PRESSURE: 127 MMHG | WEIGHT: 293 LBS | BODY MASS INDEX: 53.75 KG/M2

## 2023-01-04 VITALS — SYSTOLIC BLOOD PRESSURE: 120 MMHG | DIASTOLIC BLOOD PRESSURE: 70 MMHG | BODY MASS INDEX: 52.94 KG/M2 | WEIGHT: 293 LBS

## 2023-01-04 DIAGNOSIS — Z98.891 HISTORY OF C-SECTION: ICD-10-CM

## 2023-01-04 DIAGNOSIS — Z34.93 PRENATAL CARE IN THIRD TRIMESTER: ICD-10-CM

## 2023-01-04 DIAGNOSIS — E66.01 MORBID OBESITY WITH BMI OF 50.0-59.9, ADULT: ICD-10-CM

## 2023-01-04 DIAGNOSIS — Z3A.32 32 WEEKS GESTATION OF PREGNANCY: Primary | ICD-10-CM

## 2023-01-04 LAB
GLUCOSE UR STRIP-MCNC: NEGATIVE MG/DL
PROT UR STRIP-MCNC: NEGATIVE MG/DL

## 2023-01-04 PROCEDURE — 99212 OFFICE O/P EST SF 10 MIN: CPT | Performed by: STUDENT IN AN ORGANIZED HEALTH CARE EDUCATION/TRAINING PROGRAM

## 2023-01-04 NOTE — PROGRESS NOTES
Chief Complaint   Patient presents with   • Routine Prenatal Visit      Belem Mcknight is a 32 y.o.  at 32w6d who presents for routine prenatal visit. She reports doing well and has no complaints today. Denies vaginal bleeding, cramping, contractions, LOF. She reports active fetal movement.     /77   Wt (!) 151 kg (333 lb)   LMP  (LMP Unknown)   BMI 53.75 kg/m²    Gen: well appearing, NAD   Abd: gravid, nontender  Ext: trace lower extremity edema   See OB Flowsheet    ASSESSMENT:   1. IUP at 32w6d   2. Prenatal care in third trimester  3. Morbid obesity- BMI 54, on ASA 81 mg daily  4. History of C/S x 1- scheduled for repeat  section on 22    PLAN:  Problem list reviewed and updated.   Reviewed expectations of this stage of pregnancy.   Third trimester precautions reviewed including labor signs, monitoring fetal movements.  Will obtain growth ultrasound at next visit and plan for  testing with weekly BPPs starting at that visit.   Return in about 2 weeks (around 2023) for prenatal visit and growth/ BPP .    Patient Active Problem List    Diagnosis Date Noted   • History of  delivery 2022   • Pregnancy 2021   • Abnormal glucose tolerance test (GTT) during pregnancy, antepartum 2020   • Supervision of normal first pregnancy, antepartum 2020   • Class 3 severe obesity due to excess calories without serious comorbidity in adult (HCC) 2019   • Hyperglycemia 2019   • Elevated blood pressure reading in office with white coat syndrome, without diagnosis of hypertension 2019       Orders Placed This Encounter   Procedures   • POC Urinalysis Dipstick     This is an external result entered through the Results Console.     Order Specific Question:   Release to patient     Answer:   Routine Release       Joya Villa MD

## 2023-01-10 ENCOUNTER — TELEPHONE (OUTPATIENT)
Dept: OBSTETRICS AND GYNECOLOGY | Facility: CLINIC | Age: 33
End: 2023-01-10

## 2023-01-17 ENCOUNTER — ROUTINE PRENATAL (OUTPATIENT)
Dept: OBSTETRICS AND GYNECOLOGY | Facility: CLINIC | Age: 33
End: 2023-01-17
Payer: MEDICAID

## 2023-01-17 VITALS — DIASTOLIC BLOOD PRESSURE: 78 MMHG | SYSTOLIC BLOOD PRESSURE: 120 MMHG | WEIGHT: 293 LBS | BODY MASS INDEX: 54.07 KG/M2

## 2023-01-17 DIAGNOSIS — Z98.891 HISTORY OF C-SECTION: ICD-10-CM

## 2023-01-17 DIAGNOSIS — E66.01 MORBID OBESITY WITH BMI OF 50.0-59.9, ADULT: ICD-10-CM

## 2023-01-17 DIAGNOSIS — Z34.93 PRENATAL CARE IN THIRD TRIMESTER: ICD-10-CM

## 2023-01-17 DIAGNOSIS — Z3A.34 34 WEEKS GESTATION OF PREGNANCY: Primary | ICD-10-CM

## 2023-01-17 LAB
GLUCOSE UR STRIP-MCNC: NEGATIVE MG/DL
PROT UR STRIP-MCNC: NEGATIVE MG/DL

## 2023-01-17 PROCEDURE — 99213 OFFICE O/P EST LOW 20 MIN: CPT | Performed by: STUDENT IN AN ORGANIZED HEALTH CARE EDUCATION/TRAINING PROGRAM

## 2023-01-17 NOTE — PROGRESS NOTES
Chief Complaint   Patient presents with   • Routine Prenatal Visit      Belem Mcknight is a 32 y.o.  at 34w5d who presents for routine prenatal visit. She reports that she has been pain in her groin after waking up on her right side that is relieved with laying on her left side. It only occurs with laying on her right side. Denies  vaginal bleeding, cramping, contractions, LOF. She reports active fetal movement.   She would like a Rupal dressing following  section.     /78   Wt (!) 152 kg (335 lb)   LMP  (LMP Unknown)   BMI 54.07 kg/m²    Gen: well appearing, NAD   Abd: gravid, nontender  Ext: Trace pedal edema   See OB Flowsheet    ASSESSMENT:   1. IUP at 34w5d   2. Prenatal care in third trimester  3. Morbid obesity- BMI 54, on ASA 81 mg daily  4. History of C/S x 1 - scheduled for repeat  section on 23     PLAN:  Problem list reviewed and updated.   Reviewed expectations of this stage of pregnancy.   Third trimester precautions reviewed including labor signs, monitoring fetal movements.   Discussed ultrasound results that demonstrate BPP 8/8, cephalic presentation and normal SERGO. Will continue weekly BPPs and serial growth scans until delivery given morbid obesity.  I agree with the patient and will plan for a RUPAL dressing at time of  section.   Return in about 2 weeks (around 2023) for prenatal visit and BPP .    Patient Active Problem List    Diagnosis Date Noted   • History of  delivery 2022   • Pregnancy 2021   • Abnormal glucose tolerance test (GTT) during pregnancy, antepartum 2020   • Supervision of normal first pregnancy, antepartum 2020   • Class 3 severe obesity due to excess calories without serious comorbidity in adult (HCC) 2019   • Hyperglycemia 2019   • Elevated blood pressure reading in office with white coat syndrome, without diagnosis of hypertension 2019       Orders Placed This Encounter    Procedures   • POC Urinalysis Dipstick     This is an external result entered through the Results Console.     Order Specific Question:   Release to patient     Answer:   Routine Release     Joya Villa MD

## 2023-01-31 ENCOUNTER — ROUTINE PRENATAL (OUTPATIENT)
Dept: OBSTETRICS AND GYNECOLOGY | Facility: CLINIC | Age: 33
End: 2023-01-31
Payer: MEDICAID

## 2023-01-31 VITALS — SYSTOLIC BLOOD PRESSURE: 122 MMHG | WEIGHT: 293 LBS | BODY MASS INDEX: 54.23 KG/M2 | DIASTOLIC BLOOD PRESSURE: 76 MMHG

## 2023-01-31 DIAGNOSIS — Z3A.36 36 WEEKS GESTATION OF PREGNANCY: Primary | ICD-10-CM

## 2023-01-31 DIAGNOSIS — E66.01 MORBID OBESITY WITH BMI OF 50.0-59.9, ADULT: ICD-10-CM

## 2023-01-31 DIAGNOSIS — Z98.891 HISTORY OF C-SECTION: ICD-10-CM

## 2023-01-31 DIAGNOSIS — Z34.93 PRENATAL CARE IN THIRD TRIMESTER: ICD-10-CM

## 2023-01-31 LAB
GLUCOSE UR STRIP-MCNC: NEGATIVE MG/DL
PROT UR STRIP-MCNC: ABNORMAL MG/DL

## 2023-01-31 PROCEDURE — 99213 OFFICE O/P EST LOW 20 MIN: CPT | Performed by: STUDENT IN AN ORGANIZED HEALTH CARE EDUCATION/TRAINING PROGRAM

## 2023-01-31 NOTE — PROGRESS NOTES
Chief Complaint   Patient presents with   • Routine Prenatal Visit      Belem Mcknight is a 32 y.o.  at 36w5d who presents for routine prenatal visit. She reports doing well and has no complaints today. Denies vaginal bleeding, cramping, contractions, LOF. She reports active fetal movement.     /76   Wt (!) 152 kg (336 lb)   LMP  (LMP Unknown)   BMI 54.23 kg/m²    Gen: well appearing, NAD   Abd: gravid, nontender  Ext: 1+ non-pitting lower extremity edema   See OB Flowsheet    ASSESSMENT:   1. IUP at 36w5d   2. Prenatal care in third trimester   3. Morbid obesity- BMI 54, on ASA 81 mg daily  4. History of C/S x 1 - scheduled for repeat  section on 23     PLAN:  Problem list reviewed and updated.   Reviewed expectations of this stage of pregnancy.   Third trimester precautions reviewed including labor signs, monitoring fetal movements.   Discussed ultrasound results that demonstrated BPP 8/8, normal SERGO, and cephalic presentation. Will obtain growth ultrasound and BPP at next visit.  Will obtain GBS test at next visit, patient declined today.   Follow up in 1 week for prenatal visit and ultrasound.     Patient Active Problem List    Diagnosis Date Noted   • History of  delivery 2022   • Pregnancy 2021   • Abnormal glucose tolerance test (GTT) during pregnancy, antepartum 2020   • Supervision of normal first pregnancy, antepartum 2020   • Class 3 severe obesity due to excess calories without serious comorbidity in adult (HCC) 2019   • Hyperglycemia 2019   • Elevated blood pressure reading in office with white coat syndrome, without diagnosis of hypertension 2019       Orders Placed This Encounter   Procedures   • POC Urinalysis Dipstick     This is an external result entered through the Results Console.     Order Specific Question:   Release to patient     Answer:   Routine Release     Joya Villa MD

## 2023-02-07 ENCOUNTER — ROUTINE PRENATAL (OUTPATIENT)
Dept: OBSTETRICS AND GYNECOLOGY | Facility: CLINIC | Age: 33
End: 2023-02-07
Payer: MEDICAID

## 2023-02-07 VITALS — WEIGHT: 293 LBS | BODY MASS INDEX: 54.07 KG/M2 | DIASTOLIC BLOOD PRESSURE: 76 MMHG | SYSTOLIC BLOOD PRESSURE: 120 MMHG

## 2023-02-07 DIAGNOSIS — Z34.93 PRENATAL CARE IN THIRD TRIMESTER: ICD-10-CM

## 2023-02-07 DIAGNOSIS — E66.01 MORBID OBESITY WITH BMI OF 50.0-59.9, ADULT: ICD-10-CM

## 2023-02-07 DIAGNOSIS — Z98.891 HISTORY OF C-SECTION: ICD-10-CM

## 2023-02-07 DIAGNOSIS — Z3A.37 37 WEEKS GESTATION OF PREGNANCY: Primary | ICD-10-CM

## 2023-02-07 DIAGNOSIS — Z34.93 ENCNTR FOR SUPRVSN OF NORMAL PREG, UNSP, THIRD TRIMESTER: ICD-10-CM

## 2023-02-07 LAB
GLUCOSE UR STRIP-MCNC: NEGATIVE MG/DL
PROT UR STRIP-MCNC: NEGATIVE MG/DL

## 2023-02-07 PROCEDURE — 99214 OFFICE O/P EST MOD 30 MIN: CPT | Performed by: STUDENT IN AN ORGANIZED HEALTH CARE EDUCATION/TRAINING PROGRAM

## 2023-02-07 NOTE — PROGRESS NOTES
Chief Complaint   Patient presents with   • Routine Prenatal Visit      Belem Mcknight is a 32 y.o.  at 37w5d who presents for routine prenatal visit. She reports doing well and has no complaints today. Denies vaginal bleeding, cramping, contractions, LOF. She reports active fetal movement.     /76   Wt (!) 152 kg (335 lb)   LMP  (LMP Unknown)   BMI 54.07 kg/m²    Gen: well appearing, NAD   Abd: gravid, nontender  SVE: collected GBS swab, declined cervical check.   See OB Flowsheet    ASSESSMENT:   1. IUP at 37w5d   2. Prenatal care in third trimester  3. Morbid obesity- BMI 54, on ASA 81 mg daily  4. History of C/S x 1- scheduled for repeat  section on 23    PLAN:  Problem list reviewed and updated.   Reviewed expectations of this stage of pregnancy.   Third trimester precautions reviewed including labor signs, monitoring fetal movements.   Discussed ultrasound results that demonstrated a fetus measuring 3414 g (7 lb 8 oz), 68%ile, AC 72%ile, normal SERGO, BPP 8/8, and cephalic presentation.  Will plan for repeat  section next week on 23. Preoperative ERAS instructions provided to the patient. Planning for RUPAL dressing on the incision. All questions answered.       Patient Active Problem List    Diagnosis Date Noted   • History of  delivery 2022   • Pregnancy 2021   • Abnormal glucose tolerance test (GTT) during pregnancy, antepartum 2020   • Supervision of normal first pregnancy, antepartum 2020   • Class 3 severe obesity due to excess calories without serious comorbidity in adult (HCC) 2019   • Hyperglycemia 2019   • Elevated blood pressure reading in office with white coat syndrome, without diagnosis of hypertension 2019       Orders Placed This Encounter   Procedures   • POC Urinalysis Dipstick     This is an external result entered through the Results Console.     Order Specific Question:   Release to patient     Answer:    Routine Release     Joya Villa MD

## 2023-02-11 LAB — B-HEM STREP SPEC QL CULT: NEGATIVE

## 2023-02-14 ENCOUNTER — TELEPHONE (OUTPATIENT)
Dept: OBSTETRICS AND GYNECOLOGY | Facility: CLINIC | Age: 33
End: 2023-02-14

## 2023-02-16 ENCOUNTER — HOSPITAL ENCOUNTER (INPATIENT)
Facility: HOSPITAL | Age: 33
LOS: 2 days | Discharge: HOME | End: 2023-02-18
Attending: STUDENT IN AN ORGANIZED HEALTH CARE EDUCATION/TRAINING PROGRAM | Admitting: STUDENT IN AN ORGANIZED HEALTH CARE EDUCATION/TRAINING PROGRAM
Payer: MEDICAID

## 2023-02-16 ENCOUNTER — ANESTHESIA (OUTPATIENT)
Dept: LABOR AND DELIVERY | Facility: HOSPITAL | Age: 33
End: 2023-02-16
Payer: MEDICAID

## 2023-02-16 ENCOUNTER — ANESTHESIA EVENT (OUTPATIENT)
Dept: LABOR AND DELIVERY | Facility: HOSPITAL | Age: 33
End: 2023-02-16
Payer: MEDICAID

## 2023-02-16 DIAGNOSIS — Z98.891 HISTORY OF CESAREAN DELIVERY: Primary | ICD-10-CM

## 2023-02-16 DIAGNOSIS — Z98.891 HISTORY OF C-SECTION: ICD-10-CM

## 2023-02-16 LAB
ABO GROUP BLD: NORMAL
BLD GP AB SCN SERPL QL: NEGATIVE
DEPRECATED RDW RBC AUTO: 44.3 FL (ref 37–54)
ERYTHROCYTE [DISTWIDTH] IN BLOOD BY AUTOMATED COUNT: 12.7 % (ref 12.3–15.4)
GLUCOSE BLDC GLUCOMTR-MCNC: 83 MG/DL (ref 70–130)
HCT VFR BLD AUTO: 36.3 % (ref 34–46.6)
HGB BLD-MCNC: 12.2 G/DL (ref 12–15.9)
MCH RBC QN AUTO: 32.3 PG (ref 26.6–33)
MCHC RBC AUTO-ENTMCNC: 33.6 G/DL (ref 31.5–35.7)
MCV RBC AUTO: 96 FL (ref 79–97)
PLATELET # BLD AUTO: 224 10*3/MM3 (ref 140–450)
PMV BLD AUTO: 9.4 FL (ref 6–12)
RBC # BLD AUTO: 3.78 10*6/MM3 (ref 3.77–5.28)
RH BLD: POSITIVE
T&S EXPIRATION DATE: NORMAL
WBC NRBC COR # BLD: 12.07 10*3/MM3 (ref 3.4–10.8)

## 2023-02-16 PROCEDURE — 59514 CESAREAN DELIVERY ONLY: CPT | Performed by: OBSTETRICS & GYNECOLOGY

## 2023-02-16 PROCEDURE — 25010000002 KETOROLAC TROMETHAMINE PER 15 MG: Performed by: ANESTHESIOLOGY

## 2023-02-16 PROCEDURE — 85027 COMPLETE CBC AUTOMATED: CPT | Performed by: STUDENT IN AN ORGANIZED HEALTH CARE EDUCATION/TRAINING PROGRAM

## 2023-02-16 PROCEDURE — 25010000002 CEFAZOLIN PER 500 MG: Performed by: STUDENT IN AN ORGANIZED HEALTH CARE EDUCATION/TRAINING PROGRAM

## 2023-02-16 PROCEDURE — 86850 RBC ANTIBODY SCREEN: CPT | Performed by: STUDENT IN AN ORGANIZED HEALTH CARE EDUCATION/TRAINING PROGRAM

## 2023-02-16 PROCEDURE — S0260 H&P FOR SURGERY: HCPCS | Performed by: STUDENT IN AN ORGANIZED HEALTH CARE EDUCATION/TRAINING PROGRAM

## 2023-02-16 PROCEDURE — 25010000002 ONDANSETRON PER 1 MG: Performed by: ANESTHESIOLOGY

## 2023-02-16 PROCEDURE — 86900 BLOOD TYPING SEROLOGIC ABO: CPT | Performed by: STUDENT IN AN ORGANIZED HEALTH CARE EDUCATION/TRAINING PROGRAM

## 2023-02-16 PROCEDURE — 82962 GLUCOSE BLOOD TEST: CPT

## 2023-02-16 PROCEDURE — 86901 BLOOD TYPING SEROLOGIC RH(D): CPT | Performed by: STUDENT IN AN ORGANIZED HEALTH CARE EDUCATION/TRAINING PROGRAM

## 2023-02-16 PROCEDURE — 59515 CESAREAN DELIVERY: CPT | Performed by: STUDENT IN AN ORGANIZED HEALTH CARE EDUCATION/TRAINING PROGRAM

## 2023-02-16 PROCEDURE — 25010000002 MORPHINE PER 10 MG: Performed by: ANESTHESIOLOGY

## 2023-02-16 PROCEDURE — 25010000002 ONDANSETRON PER 1 MG: Performed by: OBSTETRICS & GYNECOLOGY

## 2023-02-16 DEVICE — ABSORBABLE HEMOSTAT (OXIDIZED REGENERATED CELLULOSE, U.S.P.)
Type: IMPLANTABLE DEVICE | Site: ABDOMEN | Status: FUNCTIONAL
Brand: SURGICEL

## 2023-02-16 RX ORDER — FAMOTIDINE 20 MG/1
20 TABLET, FILM COATED ORAL ONCE AS NEEDED
Status: DISCONTINUED | OUTPATIENT
Start: 2023-02-16 | End: 2023-02-16 | Stop reason: HOSPADM

## 2023-02-16 RX ORDER — ONDANSETRON 4 MG/1
4 TABLET, FILM COATED ORAL EVERY 6 HOURS PRN
Status: CANCELLED | OUTPATIENT
Start: 2023-02-16

## 2023-02-16 RX ORDER — MORPHINE SULFATE 2 MG/ML
2 INJECTION, SOLUTION INTRAMUSCULAR; INTRAVENOUS
Status: ACTIVE | OUTPATIENT
Start: 2023-02-16 | End: 2023-02-17

## 2023-02-16 RX ORDER — ACETAMINOPHEN 500 MG
1000 TABLET ORAL ONCE
Status: COMPLETED | OUTPATIENT
Start: 2023-02-16 | End: 2023-02-16

## 2023-02-16 RX ORDER — OXYTOCIN 10 [USP'U]/ML
999 INJECTION, SOLUTION INTRAMUSCULAR; INTRAVENOUS ONCE
Status: CANCELLED | OUTPATIENT
Start: 2023-02-16

## 2023-02-16 RX ORDER — KETOROLAC TROMETHAMINE 30 MG/ML
INJECTION, SOLUTION INTRAMUSCULAR; INTRAVENOUS AS NEEDED
Status: DISCONTINUED | OUTPATIENT
Start: 2023-02-16 | End: 2023-02-16 | Stop reason: SURG

## 2023-02-16 RX ORDER — KETOROLAC TROMETHAMINE 15 MG/ML
15 INJECTION, SOLUTION INTRAMUSCULAR; INTRAVENOUS EVERY 6 HOURS
Status: COMPLETED | OUTPATIENT
Start: 2023-02-17 | End: 2023-02-17

## 2023-02-16 RX ORDER — DOCUSATE SODIUM 100 MG/1
100 CAPSULE, LIQUID FILLED ORAL 2 TIMES DAILY
Status: DISCONTINUED | OUTPATIENT
Start: 2023-02-16 | End: 2023-02-18 | Stop reason: HOSPADM

## 2023-02-16 RX ORDER — SODIUM CHLORIDE 0.9 % (FLUSH) 0.9 %
10 SYRINGE (ML) INJECTION EVERY 12 HOURS SCHEDULED
Status: DISCONTINUED | OUTPATIENT
Start: 2023-02-16 | End: 2023-02-16 | Stop reason: HOSPADM

## 2023-02-16 RX ORDER — HYDROXYZINE 50 MG/1
50 TABLET, FILM COATED ORAL EVERY 6 HOURS PRN
Status: DISCONTINUED | OUTPATIENT
Start: 2023-02-16 | End: 2023-02-18 | Stop reason: HOSPADM

## 2023-02-16 RX ORDER — OXYCODONE HYDROCHLORIDE AND ACETAMINOPHEN 5; 325 MG/1; MG/1
1 TABLET ORAL EVERY 4 HOURS PRN
Status: DISCONTINUED | OUTPATIENT
Start: 2023-02-16 | End: 2023-02-16 | Stop reason: HOSPADM

## 2023-02-16 RX ORDER — DIPHENHYDRAMINE HYDROCHLORIDE 50 MG/ML
25 INJECTION INTRAMUSCULAR; INTRAVENOUS EVERY 4 HOURS PRN
Status: DISCONTINUED | OUTPATIENT
Start: 2023-02-16 | End: 2023-02-18 | Stop reason: HOSPADM

## 2023-02-16 RX ORDER — ONDANSETRON 2 MG/ML
4 INJECTION INTRAMUSCULAR; INTRAVENOUS EVERY 6 HOURS PRN
Status: CANCELLED | OUTPATIENT
Start: 2023-02-16

## 2023-02-16 RX ORDER — HYDROMORPHONE HYDROCHLORIDE 1 MG/ML
0.5 INJECTION, SOLUTION INTRAMUSCULAR; INTRAVENOUS; SUBCUTANEOUS
Status: CANCELLED | OUTPATIENT
Start: 2023-02-16 | End: 2023-02-23

## 2023-02-16 RX ORDER — ACETAMINOPHEN 500 MG
1000 TABLET ORAL ONCE
Status: CANCELLED | OUTPATIENT
Start: 2023-02-16 | End: 2023-02-16

## 2023-02-16 RX ORDER — BUPIVACAINE HYDROCHLORIDE 7.5 MG/ML
INJECTION, SOLUTION EPIDURAL; RETROBULBAR
Status: COMPLETED | OUTPATIENT
Start: 2023-02-16 | End: 2023-02-16

## 2023-02-16 RX ORDER — ENOXAPARIN SODIUM 100 MG/ML
60 INJECTION SUBCUTANEOUS EVERY 12 HOURS SCHEDULED
Status: DISCONTINUED | OUTPATIENT
Start: 2023-02-17 | End: 2023-02-18 | Stop reason: HOSPADM

## 2023-02-16 RX ORDER — PROMETHAZINE HYDROCHLORIDE 12.5 MG/1
12.5 SUPPOSITORY RECTAL EVERY 6 HOURS PRN
Status: DISCONTINUED | OUTPATIENT
Start: 2023-02-16 | End: 2023-02-18 | Stop reason: HOSPADM

## 2023-02-16 RX ORDER — ONDANSETRON 2 MG/ML
4 INJECTION INTRAMUSCULAR; INTRAVENOUS ONCE AS NEEDED
Status: DISCONTINUED | OUTPATIENT
Start: 2023-02-16 | End: 2023-02-18 | Stop reason: HOSPADM

## 2023-02-16 RX ORDER — OXYTOCIN 10 [USP'U]/ML
250 INJECTION, SOLUTION INTRAMUSCULAR; INTRAVENOUS CONTINUOUS
Status: CANCELLED | OUTPATIENT
Start: 2023-02-16 | End: 2023-02-16

## 2023-02-16 RX ORDER — DIPHENHYDRAMINE HCL 25 MG
25 CAPSULE ORAL EVERY 4 HOURS PRN
Status: DISCONTINUED | OUTPATIENT
Start: 2023-02-16 | End: 2023-02-18 | Stop reason: HOSPADM

## 2023-02-16 RX ORDER — OXYCODONE HYDROCHLORIDE AND ACETAMINOPHEN 5; 325 MG/1; MG/1
1 TABLET ORAL EVERY 4 HOURS PRN
Status: CANCELLED | OUTPATIENT
Start: 2023-02-16 | End: 2023-02-23

## 2023-02-16 RX ORDER — ACETAMINOPHEN 500 MG
1000 TABLET ORAL EVERY 6 HOURS
Status: COMPLETED | OUTPATIENT
Start: 2023-02-16 | End: 2023-02-17

## 2023-02-16 RX ORDER — ONDANSETRON 4 MG/1
4 TABLET, FILM COATED ORAL EVERY 6 HOURS PRN
Status: DISCONTINUED | OUTPATIENT
Start: 2023-02-16 | End: 2023-02-18 | Stop reason: HOSPADM

## 2023-02-16 RX ORDER — FAMOTIDINE 10 MG/ML
20 INJECTION, SOLUTION INTRAVENOUS ONCE AS NEEDED
Status: COMPLETED | OUTPATIENT
Start: 2023-02-16 | End: 2023-02-16

## 2023-02-16 RX ORDER — PHYTONADIONE 1 MG/.5ML
INJECTION, EMULSION INTRAMUSCULAR; INTRAVENOUS; SUBCUTANEOUS
Status: ACTIVE
Start: 2023-02-16 | End: 2023-02-17

## 2023-02-16 RX ORDER — OXYTOCIN/0.9 % SODIUM CHLORIDE 30/500 ML
999 PLASTIC BAG, INJECTION (ML) INTRAVENOUS ONCE
Status: COMPLETED | OUTPATIENT
Start: 2023-02-16 | End: 2023-02-16

## 2023-02-16 RX ORDER — SODIUM CHLORIDE 0.9 % (FLUSH) 0.9 %
10 SYRINGE (ML) INJECTION AS NEEDED
Status: DISCONTINUED | OUTPATIENT
Start: 2023-02-16 | End: 2023-02-16 | Stop reason: HOSPADM

## 2023-02-16 RX ORDER — TRANEXAMIC ACID 100 MG/ML
INJECTION, SOLUTION INTRAVENOUS AS NEEDED
Status: DISCONTINUED | OUTPATIENT
Start: 2023-02-16 | End: 2023-02-16 | Stop reason: SURG

## 2023-02-16 RX ORDER — LIDOCAINE HYDROCHLORIDE 10 MG/ML
5 INJECTION, SOLUTION EPIDURAL; INFILTRATION; INTRACAUDAL; PERINEURAL AS NEEDED
Status: DISCONTINUED | OUTPATIENT
Start: 2023-02-16 | End: 2023-02-16 | Stop reason: HOSPADM

## 2023-02-16 RX ORDER — IBUPROFEN 600 MG/1
600 TABLET ORAL EVERY 6 HOURS
Status: DISCONTINUED | OUTPATIENT
Start: 2023-02-18 | End: 2023-02-18 | Stop reason: HOSPADM

## 2023-02-16 RX ORDER — ERYTHROMYCIN 5 MG/G
OINTMENT OPHTHALMIC
Status: ACTIVE
Start: 2023-02-16 | End: 2023-02-17

## 2023-02-16 RX ORDER — OXYCODONE HYDROCHLORIDE 10 MG/1
10 TABLET ORAL EVERY 4 HOURS PRN
Status: DISCONTINUED | OUTPATIENT
Start: 2023-02-16 | End: 2023-02-18 | Stop reason: HOSPADM

## 2023-02-16 RX ORDER — ACETAMINOPHEN 500 MG
1000 TABLET ORAL ONCE
Status: DISCONTINUED | OUTPATIENT
Start: 2023-02-16 | End: 2023-02-16 | Stop reason: HOSPADM

## 2023-02-16 RX ORDER — NALOXONE HCL 0.4 MG/ML
0.2 VIAL (ML) INJECTION
Status: DISCONTINUED | OUTPATIENT
Start: 2023-02-16 | End: 2023-02-18 | Stop reason: HOSPADM

## 2023-02-16 RX ORDER — KETOROLAC TROMETHAMINE 30 MG/ML
30 INJECTION, SOLUTION INTRAMUSCULAR; INTRAVENOUS ONCE
Status: DISCONTINUED | OUTPATIENT
Start: 2023-02-16 | End: 2023-02-16 | Stop reason: HOSPADM

## 2023-02-16 RX ORDER — MISOPROSTOL 100 UG/1
800 TABLET ORAL ONCE AS NEEDED
Status: CANCELLED | OUTPATIENT
Start: 2023-02-16

## 2023-02-16 RX ORDER — SODIUM CHLORIDE 0.9 % (FLUSH) 0.9 %
10 SYRINGE (ML) INJECTION AS NEEDED
Status: CANCELLED | OUTPATIENT
Start: 2023-02-16

## 2023-02-16 RX ORDER — KETOROLAC TROMETHAMINE 15 MG/ML
30 INJECTION, SOLUTION INTRAMUSCULAR; INTRAVENOUS ONCE
Status: CANCELLED | OUTPATIENT
Start: 2023-02-16 | End: 2023-02-16

## 2023-02-16 RX ORDER — METHYLERGONOVINE MALEATE 0.2 MG/ML
200 INJECTION INTRAVENOUS ONCE AS NEEDED
Status: DISCONTINUED | OUTPATIENT
Start: 2023-02-16 | End: 2023-02-16 | Stop reason: HOSPADM

## 2023-02-16 RX ORDER — OXYTOCIN/0.9 % SODIUM CHLORIDE 30/500 ML
250 PLASTIC BAG, INJECTION (ML) INTRAVENOUS CONTINUOUS
Status: ACTIVE | OUTPATIENT
Start: 2023-02-16 | End: 2023-02-16

## 2023-02-16 RX ORDER — CARBOPROST TROMETHAMINE 250 UG/ML
250 INJECTION, SOLUTION INTRAMUSCULAR
Status: DISCONTINUED | OUTPATIENT
Start: 2023-02-16 | End: 2023-02-16 | Stop reason: HOSPADM

## 2023-02-16 RX ORDER — OXYCODONE HYDROCHLORIDE 5 MG/1
5 TABLET ORAL EVERY 4 HOURS PRN
Status: DISCONTINUED | OUTPATIENT
Start: 2023-02-16 | End: 2023-02-18 | Stop reason: HOSPADM

## 2023-02-16 RX ORDER — HYDROMORPHONE HYDROCHLORIDE 1 MG/ML
0.5 INJECTION, SOLUTION INTRAMUSCULAR; INTRAVENOUS; SUBCUTANEOUS
Status: DISCONTINUED | OUTPATIENT
Start: 2023-02-16 | End: 2023-02-16 | Stop reason: HOSPADM

## 2023-02-16 RX ORDER — HYDROCORTISONE 25 MG/G
CREAM TOPICAL 3 TIMES DAILY PRN
Status: DISCONTINUED | OUTPATIENT
Start: 2023-02-16 | End: 2023-02-18 | Stop reason: HOSPADM

## 2023-02-16 RX ORDER — MORPHINE SULFATE 1 MG/ML
INJECTION, SOLUTION EPIDURAL; INTRATHECAL; INTRAVENOUS
Status: COMPLETED | OUTPATIENT
Start: 2023-02-16 | End: 2023-02-16

## 2023-02-16 RX ORDER — ONDANSETRON 2 MG/ML
4 INJECTION INTRAMUSCULAR; INTRAVENOUS EVERY 6 HOURS PRN
Status: DISCONTINUED | OUTPATIENT
Start: 2023-02-16 | End: 2023-02-18 | Stop reason: HOSPADM

## 2023-02-16 RX ORDER — FAMOTIDINE 10 MG
20 TABLET ORAL ONCE AS NEEDED
Status: CANCELLED | OUTPATIENT
Start: 2023-02-16

## 2023-02-16 RX ORDER — SODIUM CHLORIDE, SODIUM LACTATE, POTASSIUM CHLORIDE, CALCIUM CHLORIDE 600; 310; 30; 20 MG/100ML; MG/100ML; MG/100ML; MG/100ML
125 INJECTION, SOLUTION INTRAVENOUS CONTINUOUS
Status: DISCONTINUED | OUTPATIENT
Start: 2023-02-16 | End: 2023-02-16

## 2023-02-16 RX ORDER — FAMOTIDINE 10 MG/ML
20 INJECTION, SOLUTION INTRAVENOUS ONCE AS NEEDED
Status: DISCONTINUED | OUTPATIENT
Start: 2023-02-16 | End: 2023-02-16 | Stop reason: HOSPADM

## 2023-02-16 RX ORDER — ACETAMINOPHEN 325 MG/1
650 TABLET ORAL EVERY 6 HOURS
Status: DISCONTINUED | OUTPATIENT
Start: 2023-02-17 | End: 2023-02-18 | Stop reason: HOSPADM

## 2023-02-16 RX ORDER — ONDANSETRON 2 MG/ML
4 INJECTION INTRAMUSCULAR; INTRAVENOUS EVERY 6 HOURS PRN
Status: DISCONTINUED | OUTPATIENT
Start: 2023-02-16 | End: 2023-02-16 | Stop reason: HOSPADM

## 2023-02-16 RX ORDER — PHENYLEPHRINE HCL IN 0.9% NACL 1 MG/10 ML
SYRINGE (ML) INTRAVENOUS AS NEEDED
Status: DISCONTINUED | OUTPATIENT
Start: 2023-02-16 | End: 2023-02-16 | Stop reason: SURG

## 2023-02-16 RX ORDER — METHYLERGONOVINE MALEATE 0.2 MG/ML
200 INJECTION INTRAVENOUS ONCE AS NEEDED
Status: CANCELLED | OUTPATIENT
Start: 2023-02-16

## 2023-02-16 RX ORDER — SIMETHICONE 80 MG
80 TABLET,CHEWABLE ORAL 4 TIMES DAILY PRN
Status: DISCONTINUED | OUTPATIENT
Start: 2023-02-16 | End: 2023-02-18 | Stop reason: HOSPADM

## 2023-02-16 RX ORDER — MORPHINE SULFATE 1 MG/ML
INJECTION, SOLUTION EPIDURAL; INTRATHECAL; INTRAVENOUS AS NEEDED
Status: DISCONTINUED | OUTPATIENT
Start: 2023-02-16 | End: 2023-02-16 | Stop reason: SURG

## 2023-02-16 RX ORDER — MISOPROSTOL 200 UG/1
800 TABLET ORAL ONCE AS NEEDED
Status: DISCONTINUED | OUTPATIENT
Start: 2023-02-16 | End: 2023-02-16 | Stop reason: HOSPADM

## 2023-02-16 RX ORDER — SODIUM CHLORIDE, SODIUM LACTATE, POTASSIUM CHLORIDE, CALCIUM CHLORIDE 600; 310; 30; 20 MG/100ML; MG/100ML; MG/100ML; MG/100ML
125 INJECTION, SOLUTION INTRAVENOUS CONTINUOUS
Status: CANCELLED | OUTPATIENT
Start: 2023-02-16

## 2023-02-16 RX ORDER — SODIUM CHLORIDE 0.9 % (FLUSH) 0.9 %
10 SYRINGE (ML) INJECTION EVERY 12 HOURS SCHEDULED
Status: CANCELLED | OUTPATIENT
Start: 2023-02-16

## 2023-02-16 RX ORDER — CARBOPROST TROMETHAMINE 250 UG/ML
250 INJECTION, SOLUTION INTRAMUSCULAR
Status: CANCELLED | OUTPATIENT
Start: 2023-02-16

## 2023-02-16 RX ORDER — CEFAZOLIN SODIUM IN 0.9 % NACL 3 G/100 ML
3 INTRAVENOUS SOLUTION, PIGGYBACK (ML) INTRAVENOUS ONCE
Status: COMPLETED | OUTPATIENT
Start: 2023-02-16 | End: 2023-02-16

## 2023-02-16 RX ORDER — SODIUM CHLORIDE 9 MG/ML
40 INJECTION, SOLUTION INTRAVENOUS AS NEEDED
Status: DISCONTINUED | OUTPATIENT
Start: 2023-02-16 | End: 2023-02-16 | Stop reason: HOSPADM

## 2023-02-16 RX ORDER — FAMOTIDINE 10 MG/ML
20 INJECTION, SOLUTION INTRAVENOUS ONCE AS NEEDED
Status: CANCELLED | OUTPATIENT
Start: 2023-02-16

## 2023-02-16 RX ORDER — HYDROMORPHONE HYDROCHLORIDE 1 MG/ML
0.5 INJECTION, SOLUTION INTRAMUSCULAR; INTRAVENOUS; SUBCUTANEOUS
Status: DISCONTINUED | OUTPATIENT
Start: 2023-02-16 | End: 2023-02-16 | Stop reason: SDUPTHER

## 2023-02-16 RX ORDER — ONDANSETRON 2 MG/ML
4 INJECTION INTRAMUSCULAR; INTRAVENOUS ONCE AS NEEDED
Status: COMPLETED | OUTPATIENT
Start: 2023-02-16 | End: 2023-02-16

## 2023-02-16 RX ORDER — LIDOCAINE HYDROCHLORIDE 10 MG/ML
5 INJECTION, SOLUTION EPIDURAL; INFILTRATION; INTRACAUDAL; PERINEURAL AS NEEDED
Status: CANCELLED | OUTPATIENT
Start: 2023-02-16

## 2023-02-16 RX ORDER — PROMETHAZINE HYDROCHLORIDE 25 MG/1
25 TABLET ORAL EVERY 6 HOURS PRN
Status: DISCONTINUED | OUTPATIENT
Start: 2023-02-16 | End: 2023-02-18 | Stop reason: HOSPADM

## 2023-02-16 RX ORDER — ONDANSETRON 4 MG/1
4 TABLET, FILM COATED ORAL EVERY 6 HOURS PRN
Status: DISCONTINUED | OUTPATIENT
Start: 2023-02-16 | End: 2023-02-16 | Stop reason: HOSPADM

## 2023-02-16 RX ADMIN — Medication 100 MCG: at 17:40

## 2023-02-16 RX ADMIN — SODIUM CHLORIDE, POTASSIUM CHLORIDE, SODIUM LACTATE AND CALCIUM CHLORIDE: 600; 310; 30; 20 INJECTION, SOLUTION INTRAVENOUS at 18:45

## 2023-02-16 RX ADMIN — Medication 999 ML/HR: at 17:47

## 2023-02-16 RX ADMIN — TRANEXAMIC ACID 1000 MG: 100 INJECTION, SOLUTION INTRAVENOUS at 18:20

## 2023-02-16 RX ADMIN — Medication 10 ML: at 21:15

## 2023-02-16 RX ADMIN — Medication 250 ML/HR: at 19:11

## 2023-02-16 RX ADMIN — SODIUM CHLORIDE, POTASSIUM CHLORIDE, SODIUM LACTATE AND CALCIUM CHLORIDE 1000 ML: 600; 310; 30; 20 INJECTION, SOLUTION INTRAVENOUS at 15:55

## 2023-02-16 RX ADMIN — ONDANSETRON 4 MG: 2 INJECTION INTRAMUSCULAR; INTRAVENOUS at 22:52

## 2023-02-16 RX ADMIN — DOCUSATE SODIUM 100 MG: 100 CAPSULE, LIQUID FILLED ORAL at 22:00

## 2023-02-16 RX ADMIN — FAMOTIDINE 20 MG: 10 INJECTION INTRAVENOUS at 16:45

## 2023-02-16 RX ADMIN — MORPHINE SULFATE 2 MG: 1 INJECTION, SOLUTION EPIDURAL; INTRATHECAL; INTRAVENOUS at 18:24

## 2023-02-16 RX ADMIN — MORPHINE SULFATE 150 MCG: 1 INJECTION EPIDURAL; INTRATHECAL; INTRAVENOUS at 17:20

## 2023-02-16 RX ADMIN — SODIUM CHLORIDE, POTASSIUM CHLORIDE, SODIUM LACTATE AND CALCIUM CHLORIDE 125 ML/HR: 600; 310; 30; 20 INJECTION, SOLUTION INTRAVENOUS at 16:45

## 2023-02-16 RX ADMIN — KETOROLAC TROMETHAMINE 30 MG: 30 INJECTION, SOLUTION INTRAMUSCULAR; INTRAVENOUS at 18:53

## 2023-02-16 RX ADMIN — ONDANSETRON 4 MG: 2 INJECTION INTRAMUSCULAR; INTRAVENOUS at 16:45

## 2023-02-16 RX ADMIN — CEFAZOLIN 3 G: 10 INJECTION, POWDER, FOR SOLUTION INTRAVENOUS at 17:05

## 2023-02-16 RX ADMIN — BUPIVACAINE HYDROCHLORIDE 1.6 ML: 7.5 INJECTION, SOLUTION EPIDURAL; RETROBULBAR at 17:20

## 2023-02-16 RX ADMIN — ACETAMINOPHEN 1000 MG: 500 TABLET, FILM COATED ORAL at 16:45

## 2023-02-16 RX ADMIN — ACETAMINOPHEN 1000 MG: 500 TABLET, FILM COATED ORAL at 23:27

## 2023-02-16 NOTE — ANESTHESIA PREPROCEDURE EVALUATION
Anesthesia Evaluation     Patient summary reviewed and Nursing notes reviewed   no history of anesthetic complications:  NPO Solid Status: > 8 hours  NPO Liquid Status: > 2 hours           Airway   Mallampati: II  TM distance: >3 FB  Neck ROM: full  Possible difficult intubation and Large neck circumference  Dental - normal exam     Pulmonary    (-) rhonchi, decreased breath sounds, wheezes, not a smoker  Cardiovascular   Exercise tolerance: good (4-7 METS)    Rhythm: regular  Rate: normal    (+) hypertension, hyperlipidemia,   (-) CAD, angina, MELLO, murmur      Neuro/Psych  (+) headaches,    (-) CVA  GI/Hepatic/Renal/Endo    (+) morbid obesity,  thyroid problem thyroid nodules  (-) no renal disease, diabetes    Musculoskeletal     (+) back pain,   Abdominal     Abdomen: soft.   Substance History      OB/GYN    (+) Pregnant, pregnancy induced hypertension        Other                      Anesthesia Plan    ASA 3     spinal     intravenous induction     Anesthetic plan, risks, benefits, and alternatives have been provided, discussed and informed consent has been obtained with: patient.        CODE STATUS:    Level Of Support Discussed With: Patient  Code Status (Patient has no pulse and is not breathing): CPR (Attempt to Resuscitate)  Medical Interventions (Patient has pulse or is breathing): Full Support  Release to patient: Routine Release

## 2023-02-16 NOTE — ANESTHESIA PROCEDURE NOTES
Spinal Block      Patient reassessed immediately prior to procedure    Patient location during procedure: OR  Start Time: 2/16/2023 5:16 PM  Stop Time: 2/16/2023 5:20 PM  Indication:post-op pain management and procedure for pain  Performed By  Anesthesiologist: Brandon Bunch MD  Preanesthetic Checklist  Completed: patient identified, IV checked, site marked, risks and benefits discussed, surgical consent, monitors and equipment checked, pre-op evaluation and timeout performed  Spinal Block Prep:  Patient Position:sitting  Sterile Tech:cap, gloves, mask and sterile barriers  Prep:Chloraprep  Patient Monitoring:blood pressure monitoring, continuous pulse oximetry and EKG    Spinal Block Procedure  Approach:midline  Guidance:palpation technique  Location:L4-L5  Needle Type:Mariaa  Needle Gauge:24 G  Placement of Spinal needle event:cerebrospinal fluid aspirated  Paresthesia: no  Fluid Appearance:clear  Medications: bupivacaine PF (MARCAINE) 0.75 % injection - Spinal   1.6 mL - 2/16/2023 5:20:00 PM  Morphine PF injection - Intrathecal   150 mcg - 2/16/2023 5:20:00 PM   Post Assessment  Patient Tolerance:patient tolerated the procedure well with no apparent complications  Complications no

## 2023-02-17 LAB
BASOPHILS # BLD AUTO: 0.05 10*3/MM3 (ref 0–0.2)
BASOPHILS NFR BLD AUTO: 0.4 % (ref 0–1.5)
DEPRECATED RDW RBC AUTO: 42.3 FL (ref 37–54)
EOSINOPHIL # BLD AUTO: 0.1 10*3/MM3 (ref 0–0.4)
EOSINOPHIL NFR BLD AUTO: 0.8 % (ref 0.3–6.2)
ERYTHROCYTE [DISTWIDTH] IN BLOOD BY AUTOMATED COUNT: 12.3 % (ref 12.3–15.4)
HCT VFR BLD AUTO: 30.3 % (ref 34–46.6)
HGB BLD-MCNC: 10.4 G/DL (ref 12–15.9)
IMM GRANULOCYTES # BLD AUTO: 0.05 10*3/MM3 (ref 0–0.05)
IMM GRANULOCYTES NFR BLD AUTO: 0.4 % (ref 0–0.5)
LYMPHOCYTES # BLD AUTO: 1.77 10*3/MM3 (ref 0.7–3.1)
LYMPHOCYTES NFR BLD AUTO: 14.3 % (ref 19.6–45.3)
MCH RBC QN AUTO: 32.7 PG (ref 26.6–33)
MCHC RBC AUTO-ENTMCNC: 34.3 G/DL (ref 31.5–35.7)
MCV RBC AUTO: 95.3 FL (ref 79–97)
MONOCYTES # BLD AUTO: 1.02 10*3/MM3 (ref 0.1–0.9)
MONOCYTES NFR BLD AUTO: 8.3 % (ref 5–12)
NEUTROPHILS NFR BLD AUTO: 75.8 % (ref 42.7–76)
NEUTROPHILS NFR BLD AUTO: 9.37 10*3/MM3 (ref 1.7–7)
NRBC BLD AUTO-RTO: 0 /100 WBC (ref 0–0.2)
PLATELET # BLD AUTO: 183 10*3/MM3 (ref 140–450)
PMV BLD AUTO: 9.6 FL (ref 6–12)
RBC # BLD AUTO: 3.18 10*6/MM3 (ref 3.77–5.28)
WBC NRBC COR # BLD: 12.36 10*3/MM3 (ref 3.4–10.8)

## 2023-02-17 PROCEDURE — 25010000002 ENOXAPARIN PER 10 MG: Performed by: OBSTETRICS & GYNECOLOGY

## 2023-02-17 PROCEDURE — 0503F POSTPARTUM CARE VISIT: CPT | Performed by: NURSE PRACTITIONER

## 2023-02-17 PROCEDURE — 85025 COMPLETE CBC W/AUTO DIFF WBC: CPT | Performed by: OBSTETRICS & GYNECOLOGY

## 2023-02-17 PROCEDURE — 25010000002 KETOROLAC TROMETHAMINE PER 15 MG: Performed by: OBSTETRICS & GYNECOLOGY

## 2023-02-17 RX ADMIN — KETOROLAC TROMETHAMINE 15 MG: 15 INJECTION, SOLUTION INTRAMUSCULAR; INTRAVENOUS at 14:30

## 2023-02-17 RX ADMIN — ENOXAPARIN SODIUM 60 MG: 100 INJECTION SUBCUTANEOUS at 08:30

## 2023-02-17 RX ADMIN — KETOROLAC TROMETHAMINE 15 MG: 15 INJECTION, SOLUTION INTRAMUSCULAR; INTRAVENOUS at 02:24

## 2023-02-17 RX ADMIN — ACETAMINOPHEN 1000 MG: 500 TABLET, FILM COATED ORAL at 17:34

## 2023-02-17 RX ADMIN — OXYCODONE HYDROCHLORIDE 10 MG: 10 TABLET ORAL at 23:07

## 2023-02-17 RX ADMIN — DOCUSATE SODIUM 100 MG: 100 CAPSULE, LIQUID FILLED ORAL at 20:34

## 2023-02-17 RX ADMIN — SIMETHICONE 80 MG: 80 TABLET, CHEWABLE ORAL at 14:22

## 2023-02-17 RX ADMIN — DOCUSATE SODIUM 100 MG: 100 CAPSULE, LIQUID FILLED ORAL at 08:30

## 2023-02-17 RX ADMIN — ENOXAPARIN SODIUM 60 MG: 100 INJECTION SUBCUTANEOUS at 21:53

## 2023-02-17 RX ADMIN — SIMETHICONE 80 MG: 80 TABLET, CHEWABLE ORAL at 20:50

## 2023-02-17 RX ADMIN — OXYCODONE HYDROCHLORIDE 10 MG: 10 TABLET ORAL at 17:34

## 2023-02-17 RX ADMIN — ACETAMINOPHEN 650 MG: 325 TABLET, FILM COATED ORAL at 23:04

## 2023-02-17 RX ADMIN — ACETAMINOPHEN 1000 MG: 500 TABLET, FILM COATED ORAL at 11:03

## 2023-02-17 RX ADMIN — KETOROLAC TROMETHAMINE 15 MG: 15 INJECTION, SOLUTION INTRAMUSCULAR; INTRAVENOUS at 08:42

## 2023-02-17 RX ADMIN — OXYCODONE HYDROCHLORIDE 10 MG: 10 TABLET ORAL at 13:18

## 2023-02-17 RX ADMIN — KETOROLAC TROMETHAMINE 15 MG: 15 INJECTION, SOLUTION INTRAMUSCULAR; INTRAVENOUS at 20:34

## 2023-02-17 RX ADMIN — ACETAMINOPHEN 1000 MG: 500 TABLET, FILM COATED ORAL at 05:01

## 2023-02-17 NOTE — ANESTHESIA POSTPROCEDURE EVALUATION
"Patient: Belem Mcknight    Procedure Summary     Date: 23 Room / [facility]:  [facility]    Anesthesia Start:  Anesthesia Stop:     Procedure:  SECTION REPEAT (Abdomen) Diagnosis:       History of  delivery      (History of  delivery [Z98.891])    Surgeons: Joya Villa MD Provider: Brandon Bunch MD    Anesthesia Type: spinal ASA Status: 3          Anesthesia Type: No value filed.    Vitals  Vitals Value Taken Time   /61 23   Temp 36.6 °C (97.8 °F) 23 1903   Pulse 83 23   Resp 16 23   SpO2 96 % 23   Vitals shown include unvalidated device data.        Post Anesthesia Care and Evaluation    Patient location during evaluation: bedside  Patient participation: complete - patient participated  Level of consciousness: awake  Pain management: adequate    Airway patency: patent  Anesthetic complications: No anesthetic complications    Cardiovascular status: acceptable  Respiratory status: acceptable  Hydration status: acceptable    Comments: /61   Pulse 81   Temp 36.6 °C (97.8 °F) (Oral)   Resp 16   Ht 167.6 cm (66\")   Wt (!) 152 kg (336 lb 3.2 oz)   LMP  (LMP Unknown)   SpO2 96%   Breastfeeding Yes   BMI 54.26 kg/m²       " No change

## 2023-02-18 VITALS
TEMPERATURE: 97.9 F | WEIGHT: 293 LBS | BODY MASS INDEX: 47.09 KG/M2 | HEIGHT: 66 IN | HEART RATE: 73 BPM | RESPIRATION RATE: 16 BRPM | OXYGEN SATURATION: 95 % | SYSTOLIC BLOOD PRESSURE: 110 MMHG | DIASTOLIC BLOOD PRESSURE: 71 MMHG

## 2023-02-18 PROCEDURE — 25010000002 ENOXAPARIN PER 10 MG: Performed by: OBSTETRICS & GYNECOLOGY

## 2023-02-18 PROCEDURE — 0503F POSTPARTUM CARE VISIT: CPT | Performed by: OBSTETRICS & GYNECOLOGY

## 2023-02-18 RX ORDER — PSEUDOEPHEDRINE HCL 30 MG
100 TABLET ORAL 2 TIMES DAILY
Qty: 20 CAPSULE | Refills: 0 | Status: SHIPPED | OUTPATIENT
Start: 2023-02-18 | End: 2023-02-22

## 2023-02-18 RX ORDER — IBUPROFEN 600 MG/1
600 TABLET ORAL EVERY 6 HOURS
Qty: 30 TABLET | Refills: 0 | Status: SHIPPED | OUTPATIENT
Start: 2023-02-18 | End: 2023-02-22

## 2023-02-18 RX ORDER — OXYCODONE HYDROCHLORIDE 5 MG/1
5 TABLET ORAL EVERY 6 HOURS PRN
Qty: 20 TABLET | Refills: 0 | Status: SHIPPED | OUTPATIENT
Start: 2023-02-18 | End: 2023-03-29

## 2023-02-18 RX ADMIN — SIMETHICONE 80 MG: 80 TABLET, CHEWABLE ORAL at 08:52

## 2023-02-18 RX ADMIN — ACETAMINOPHEN 650 MG: 325 TABLET, FILM COATED ORAL at 05:09

## 2023-02-18 RX ADMIN — ENOXAPARIN SODIUM 60 MG: 100 INJECTION SUBCUTANEOUS at 08:11

## 2023-02-18 RX ADMIN — DOCUSATE SODIUM 100 MG: 100 CAPSULE, LIQUID FILLED ORAL at 08:11

## 2023-02-18 RX ADMIN — IBUPROFEN 600 MG: 600 TABLET, FILM COATED ORAL at 08:11

## 2023-02-18 RX ADMIN — OXYCODONE HYDROCHLORIDE 10 MG: 10 TABLET ORAL at 05:09

## 2023-02-18 RX ADMIN — IBUPROFEN 600 MG: 600 TABLET, FILM COATED ORAL at 02:11

## 2023-02-18 RX ADMIN — ACETAMINOPHEN 650 MG: 325 TABLET, FILM COATED ORAL at 11:13

## 2023-02-22 ENCOUNTER — OFFICE VISIT (OUTPATIENT)
Dept: OBSTETRICS AND GYNECOLOGY | Facility: CLINIC | Age: 33
End: 2023-02-22
Payer: MEDICAID

## 2023-02-22 VITALS
SYSTOLIC BLOOD PRESSURE: 125 MMHG | BODY MASS INDEX: 47.09 KG/M2 | DIASTOLIC BLOOD PRESSURE: 84 MMHG | WEIGHT: 293 LBS | HEIGHT: 66 IN

## 2023-02-22 DIAGNOSIS — Z98.891 S/P CESAREAN SECTION: ICD-10-CM

## 2023-02-22 DIAGNOSIS — E66.01 MORBID OBESITY WITH BMI OF 50.0-59.9, ADULT: ICD-10-CM

## 2023-02-22 DIAGNOSIS — Z09 POSTOPERATIVE FOLLOW-UP: Primary | ICD-10-CM

## 2023-02-22 PROCEDURE — 99024 POSTOP FOLLOW-UP VISIT: CPT | Performed by: STUDENT IN AN ORGANIZED HEALTH CARE EDUCATION/TRAINING PROGRAM

## 2023-03-20 RX ORDER — ACETAMINOPHEN AND CODEINE PHOSPHATE 120; 12 MG/5ML; MG/5ML
1 SOLUTION ORAL DAILY
Qty: 28 TABLET | Refills: 12 | Status: SHIPPED | OUTPATIENT
Start: 2023-03-20 | End: 2024-03-19

## 2023-03-29 ENCOUNTER — POSTPARTUM VISIT (OUTPATIENT)
Dept: OBSTETRICS AND GYNECOLOGY | Facility: CLINIC | Age: 33
End: 2023-03-29
Payer: MEDICAID

## 2023-03-29 VITALS
BODY MASS INDEX: 47.09 KG/M2 | DIASTOLIC BLOOD PRESSURE: 69 MMHG | SYSTOLIC BLOOD PRESSURE: 110 MMHG | WEIGHT: 293 LBS | HEIGHT: 66 IN

## 2023-03-29 DIAGNOSIS — Z98.891 S/P CESAREAN SECTION: ICD-10-CM

## 2023-03-29 DIAGNOSIS — Z12.4 CERVICAL CANCER SCREENING: ICD-10-CM

## 2023-03-29 DIAGNOSIS — E04.2 MULTINODULAR GOITER: ICD-10-CM

## 2023-03-29 DIAGNOSIS — E28.2 PCOS (POLYCYSTIC OVARIAN SYNDROME): ICD-10-CM

## 2023-03-29 DIAGNOSIS — E66.01 MORBID OBESITY WITH BMI OF 50.0-59.9, ADULT: ICD-10-CM

## 2023-03-29 RX ORDER — METFORMIN HYDROCHLORIDE 750 MG/1
750 TABLET, EXTENDED RELEASE ORAL
Qty: 30 TABLET | Refills: 11 | Status: SHIPPED | OUTPATIENT
Start: 2023-03-29 | End: 2024-03-28

## 2023-03-29 NOTE — PROGRESS NOTES
Chief Complaint   Patient presents with   • Postpartum Care     Breast and bottle feeding  female 0hb23hs        SUBJECTIVE:   Belem Mcknight is a 32 y.o.  who presents s/p  Repeat Low Transverse  Section on 23. She reports doing well and states that once in a while she has a migraine headache. This happened before pregnancy.    Bowel and bladder function have returned to normal.   She has stopped having vaginal bleeding but not had her period yet.   Denies mood changes and scored a 1 on postpartum depression scale.    She is breast and bottle feeding.   She is concerned today regarding weight and how she gained it in the past and has difficulty losing weight. She states that the only time she loses weight is in pregnancy usually. She has lost 23 lb since delivery. She has previously seen endocrinology for management of multinodular goiter and abnormal weight gain. She does not wish to return to [facility] but would like a referral again. She did start metformin just prior to pregnancy but then stopped it once pregnant. She reports history of PCOS.     Past Medical History:   Diagnosis Date   • Back pain    • Bulging lumbar disc     last incident    • Gestational hypertension    • Headache    • Placenta previa     first pregnancy   • Tailbone injury     broken possibly twice 16 years old and 21 years old   • Thyroid nodule     enlarged- benign biopsy     Past Surgical History:   Procedure Laterality Date   •  SECTION N/A 2021    Procedure:  SECTION PRIMARY;  Surgeon: Carol Cueva MD;  Location: [facility] DELIV[facility];  Service: Obstetrics/Gynecology;  Laterality: N/A;   •  SECTION N/A 2023    Procedure:  SECTION REPEAT;  Surgeon: Joya Villa MD;  Location: [facility] LABOR DELIVERY;  Service: Obstetrics/Gynecology;  Laterality: N/A;     Social History     Tobacco Use   • Smoking status: Never   • Smokeless tobacco: Never  "  Vaping Use   • Vaping Use: Never used   Substance Use Topics   • Alcohol use: No   • Drug use: No     Family History   Problem Relation Age of Onset   • Colon cancer Maternal Grandmother    • Cancer Maternal Grandmother    • Hypertension Maternal Grandfather    • Heart disease Paternal Aunt    • Depression Mother    • OCD Mother    • Neuropathy Mother    • COPD Mother    • Melanoma Mother    • Diabetes Paternal Grandmother    • Heart disease Paternal Grandmother    • Skin cancer Father    • Cancer Paternal Grandfather    • Breast cancer Neg Hx    • Ovarian cancer Neg Hx    • Uterine cancer Neg Hx    • Cystic fibrosis Neg Hx    • Congenital heart disease Neg Hx        Patient Active Problem List   Diagnosis   • Class 3 severe obesity due to excess calories without serious comorbidity in adult (HCC)   • Hyperglycemia   • Elevated blood pressure reading in office with white coat syndrome, without diagnosis of hypertension   • Supervision of normal first pregnancy, antepartum   • Abnormal glucose tolerance test (GTT) during pregnancy, antepartum   • Pregnancy   • History of         OBJECTIVE:   /69   Ht 167.6 cm (65.98\")   Wt (!) 142 kg (313 lb)   LMP  (LMP Unknown)   BMI 50.54 kg/m²    Physical Examination:  General appearance - alert, well appearing, and in no distress  Breasts - Examined in supine position  Symmetric without masses or skin dimpling  Nipples normal without inversion, lesions or discharge  There are no palpable axillary nodes  Chest - no tachypnea, retractions or cyanosis  Heart - normal rate and regular rhythm  Abdomen - soft, nontender, nondistended, no masses or organomegaly; Incision healing well, no drainage, no erythema, no hernia, no seroma, no swelling  Pelvic - normal external female genitalia, normal vulva, normal vaginal mucosa, normal appearing cervix, uterus non-tender and normal sized, no adnexal masses or tenderness   Neurological - screening mental status exam " normal  Musculoskeletal - no joint tenderness, deformity or swelling  Psychiatric - normal mood and affect    Lab Results   Component Value Date    WBC 12.36 (H) 02/17/2023    HGB 10.4 (L) 02/17/2023    HCT 30.3 (L) 02/17/2023    MCV 95.3 02/17/2023     02/17/2023        ASSESSMENT:   1. S/p repeat LTCS  2. Morbid obesity- BMI 50  3. Multinodular goiter   4. Cervical cancer screening   5. Contraception - on Micronor   6. PCOS     PLAN:   Doing well postpartum.   Baby is doing well.   Contraception - Patient has started Micronor for birth control.   At this time, she may resume normal activities including sexual intercourse, lifting and exercise.   Reviewed last pap smear -3/26/19- NILM, negative HPV. Performed pap smear with HPV cotesting today.   Will place referral to endocrinology for evaluation of morbid obesity- BMI 50, PCOS, and multinodular goiter and will restart the patient on Metformin  mg daily.   Return in about 1 year (around 3/29/2024) for Annual physical.    Joya Villa MD

## 2023-04-06 LAB
CYTOLOGIST CVX/VAG CYTO: NORMAL
CYTOLOGY CVX/VAG DOC CYTO: NORMAL
CYTOLOGY CVX/VAG DOC THIN PREP: NORMAL
DX ICD CODE: NORMAL
HIV 1 & 2 AB SER-IMP: NORMAL
HPV I/H RISK 4 DNA CVX QL PROBE+SIG AMP: NEGATIVE
Lab: NORMAL
OTHER STN SPEC: NORMAL
STAT OF ADQ CVX/VAG CYTO-IMP: NORMAL

## 2023-04-14 ENCOUNTER — TELEPHONE (OUTPATIENT)
Dept: OBSTETRICS AND GYNECOLOGY | Facility: CLINIC | Age: 33
End: 2023-04-14
Payer: MEDICAID

## 2023-04-14 DIAGNOSIS — R63.5 WEIGHT GAIN: Primary | ICD-10-CM

## 2023-04-14 DIAGNOSIS — E04.2 MULTINODULAR GOITER: ICD-10-CM

## 2023-04-14 NOTE — TELEPHONE ENCOUNTER
Patient is requesting to have referral sent in for endocrinologist.   Patient states she has 2 menstrual cycles and has gained 15lbs in the past 3wks. Patient states this was discussed at last office visit.        Please advise,  Thank you

## 2023-06-07 ENCOUNTER — OFFICE VISIT (OUTPATIENT)
Dept: ENDOCRINOLOGY | Age: 33
End: 2023-06-07
Payer: COMMERCIAL

## 2023-06-07 VITALS
DIASTOLIC BLOOD PRESSURE: 80 MMHG | TEMPERATURE: 97.1 F | HEART RATE: 88 BPM | HEIGHT: 66 IN | WEIGHT: 293 LBS | OXYGEN SATURATION: 97 % | SYSTOLIC BLOOD PRESSURE: 130 MMHG | BODY MASS INDEX: 47.09 KG/M2

## 2023-06-07 DIAGNOSIS — E04.1 SOLITARY THYROID NODULE: Primary | ICD-10-CM

## 2023-06-07 DIAGNOSIS — R73.9 HYPERGLYCEMIA: ICD-10-CM

## 2023-06-07 DIAGNOSIS — R63.5 ABNORMAL WEIGHT GAIN: Chronic | ICD-10-CM

## 2023-06-07 RX ORDER — SEMAGLUTIDE 1.34 MG/ML
INJECTION, SOLUTION SUBCUTANEOUS
Qty: 4.5 ML | Refills: 1 | Status: SHIPPED | OUTPATIENT
Start: 2023-06-07

## 2023-06-07 NOTE — PATIENT INSTRUCTIONS
Labs today    Let's try for semaglutide- Ozempic (wegovy is weight loss version but not currently available)  If not covered we can try for daily Saxenda instead  If not covered other options: Qsymia or phentermine    Stop metformin once you start this.     Take OZEMPIC once per WEEK, on the same day each week, at any time of day, with or without a meal. (The day of administration may be changed if necessary, as long as the time between the two doses is at least 48 hours)?  Test blood glucose 2 or more times per day.?    OZEMPIC kits must be kept in the refrigerator until you are ready to give the injection.?    Administer OZEMPIC in abdomen, back of the arm or thigh. Rotate where you give the injection…at least 1 to 2 inches apart from the last dose.?    If you miss an injection take it as soon as you remember, within 5 days of the missed dose. If more than 5 days have passed, skip the missed dose and give your next dose on its regularly scheduled day.?    STARTING DOSE OF OZEMPIC: The starter kit has enough for 4 weeks of the starting dose, then 2 weeks of the next dose?    START with 0.25 mg weekly for 4 weeks?    AFTER 4 WEEKS, INCREASE DOSE to 0.5 mg weekly for at least 4 weeks. Then check with doctor's office about whether to continue same dose or further increase to maximum dose of 1 mg weekly.?    Side Effects: nausea, weight loss, low blood glucose.?Symptoms of low blood glucose: shaky, sweaty, fast heart beat, irritable confused or extreme hunger.If symptoms occur take one half cup of juice or regular soda. Wait 15 minutes and re-test. Always carry a fast acting carbohydrate, ID, and your meter.    Call with results in 2 week or earlier if experiencing blood sugar levels under 70, over 300, abdominal pain (with or without radiation to the back) or any new symptom. If you vomit or experience abdominal pain, stop Ozempic and call your physician immediately. After 2 weeks you may need an adjustment in your  other diabetes medications?    The drug company wants you to know that they have found a rare form of thyroid cancer in mice and rats (similar studies in humans have neither proven/disproven any increased likelihood of cancer) with this family of medications. They also want you to know that there may be a risk of pancreatitis. In one trial with Ozempic and patients with high cardiovascular risk, there was in increase in diabetic retinopathy (eye) events. The effect of long-term glucose control with this medication on diabetic retinopathy has not been studied. In general, rapid improvement in glucose control has been associated with temporary worsening of existing diabetic retinopathy. Please discuss these issues with your physician.?    Discontinue use at least 2 months prior to planning a pregnancy

## 2023-06-07 NOTE — PROGRESS NOTES
"Chief Complaint  Establish Care (WEIGHT GAIN)      HPI:  Belem Mcknight presents to [facility] for eval thyroid.     Hx nodules  Thyroid enlargement x 10 years- eventually had US  Saw endo in , had FNA    no XRT  Mom Fhx thyroid dz- hypothyroidism- recent dx      + MVI- prenatals  no Biotin    Weight : as below  S/p csection   , 2 sections  Tried nursing, not enough milk production  Sleep: ok  Heart: no palps  Bowel movements: fine, takes miralax  Energy : good  Heat/cold intolerance: no issues  Menses: resumed after delivery- last cycle last month, more normal    no dysphagia  no choking when supine  no anterior neck pain  no hoarseness/voice change      Hx US:  US was done near urgent care  Scanned in, unable to to view (in Care Everywhere)    Hx FNAs  21 Radiology  R 1.5 cm heterog nodule- benign, [place] II    2) Weight  S/p C section 23  Hx Morbid obesity prior to pregnancy- BMI 54    Lost during pregnancies. Lost 65 in first pregnancy. Lost 20# in second pregnancy, regained 5#. Now at 3rd trimester wt. After delivery down to 305, now 332.    C/o low libido, \"nonexistent\"  Hx high testosterone in past  +Facial hair- lip/beard- plucks  No menstrual issues  Menarche 7, regular  On metformin per OB- hoping it would help her lose weight  Hx Cushings in cousin  Hx extreme obesity in family  Has had eval for Cushings with diabetic specialist in past. Negative. Age 18-20 or so. Has had ongoing weight issues since elementary school.     Wt loss attempts:  Calorie deficit  Low carb (got gallstones)  Gym 5 days a week for a year  Free version of Weight Watchers  Started looking at lap band in past- in [id number]      Medical Records Reviewed:    3/19 US thyroid  HISTORY: 28-year-old female with a history of an enlarged thyroid.    FINDINGS: Sonographic evaluation of the thyroid was performed. No prior  thyroid sonogram is available for comparison. The right lobe of " thyroid  measures 4.7 x 1.5 x 1.8 cm and the left lobe measures 3.8 x 0.9 x 1.7  cm. The isthmus measures 0.4 cm in thickness. In the right lobe of the  thyroid, there is a 1 cm mixed cystic and solid nodule. No other nodules  are visualized. No other abnormality of the thyroid is appreciated.    IMPRESSION:  1 cm mixed cystic and solid nodule in the right lobe of the  thyroid, otherwise normal thyroid sonogram.       7/21 FNA R nodule  CLINICAL HISTORY:   Right thyroid nodule 1.26 x 1.47 x 0.7cm   Pre-Operative Diagnosis:  Right thyroid nodule     SPECIMEN ADEQUACY:   Satisfactory for evaluation     CYTOLOGIC DIAGNOSIS:   A) Right thyroid nodule, fine needle aspiration (10 smears only):   - Benign ([place] category II).   - Benign appearing follicular cells in a background of blood and colloid, consistent with a    benign follicular nodule.     Electronically signed by DONNA DELAROSAPAT   Verified: 07/30/21        Latest Reference Range & Units 02/27/19 09:13 02/10/20 12:39 07/17/20 14:08   TSH Baseline 0.270 - 4.200 uIU/mL 2.620 2.360 1.360   Free T4 0.93 - 1.70 ng/dL  1.17    T3, Total 80.0 - 200.0 ng/dl  128.0       Latest Reference Range & Units 09/30/20 12:24 10/09/20 09:30 11/25/20 11:19 12/01/22 10:38   Gestational Diabetes Screen 65 - 139 mg/dL 138  151 (H) 127   Glucose - Fasting 65 - 94 mg/dL  81     Glucose - 1 Hour 65 - 179 mg/dL  159     Glucose - 2 Hour 65 - 154 mg/dL  118     Glucose - 3 Hour 65 - 139 mg/dL  58 (L)     (H): Data is abnormally high  (L): Data is abnormally low     Latest Reference Range & Units 02/27/19 09:13 11/18/22 15:38 12/21/22 16:19 02/16/23 20:15   Glucose 70 - 130 mg/dL 102 (H) 107 (H) 95 83   (H): Data is abnormally high          Past Medical History:   Diagnosis Date    Back pain     Bulging lumbar disc     last incident 2020    Gestational hypertension     Headache     Placenta previa 2021    first pregnancy    Tailbone injury     broken possibly twice 16 years old and 21  years old    Thyroid nodule     enlarged- benign biopsy     Past Surgical History:   Procedure Laterality Date     SECTION N/A 2021    Procedure:  SECTION PRIMARY;  Surgeon: Carol Cueva MD;  Location:  [facility];  Service: Obstetrics/Gynecology;  Laterality: N/A;     SECTION N/A 2023    Procedure:  SECTION REPEAT;  Surgeon: Joya Villa MD;  Location:  [facility];  Service: Obstetrics/Gynecology;  Laterality: N/A;     Family History   Problem Relation Age of Onset    Colon cancer Maternal Grandmother     Cancer Maternal Grandmother     Hypertension Maternal Grandfather     Heart disease Paternal Aunt     Depression Mother     OCD Mother     Neuropathy Mother     COPD Mother     Melanoma Mother     Diabetes Paternal Grandmother     Heart disease Paternal Grandmother     Skin cancer Father     Cancer Paternal Grandfather     Breast cancer Neg Hx     Ovarian cancer Neg Hx     Uterine cancer Neg Hx     Cystic fibrosis Neg Hx     Congenital heart disease Neg Hx      Social History     Socioeconomic History    Marital status:    Tobacco Use    Smoking status: Never    Smokeless tobacco: Never   Vaping Use    Vaping Use: Never used   Substance and Sexual Activity    Alcohol use: No    Drug use: No    Sexual activity: Yes     Partners: Male     Birth control/protection: None     Comment: sig other = PREET       ROS: The following systems were specifically with patient and were unremarkable:constitutional, ophtho, ENT, CV, pulm, GI, , musculoskeletal, derm, neuro, psych, heme-onc, allergy, and endocrine (other than HPI).  Had ruptured disk prior to delivery but doing ok now.       MEDICATIONS  Current Outpatient Medications   Medication Sig Dispense Refill    metFORMIN ER (GLUCOPHAGE-XR) 750 MG 24 hr tablet Take 1 tablet by mouth Daily With Breakfast. 30 tablet 11    norethindrone (MICRONOR) 0.35 MG tablet Take 1 tablet by mouth Daily.  "28 tablet 12    Oxymetazoline HCl (NASAL SPRAY SINUS NA) into the nostril(s) as directed by provider.      Prenatal-FeFum-FA-DHA w/o A (PRENATAL + DHA PO) Take  by mouth.       No current facility-administered medications for this visit.       Physical Exam:  Vital Signs:  /80   Pulse 88   Temp 97.1 °F (36.2 °C)   Ht 167.6 cm (65.98\")   Wt (!) 151 kg (332 lb 3.2 oz)   SpO2 97%   BMI 53.64 kg/m²   Estimated body mass index is 50.54 kg/m² as calculated from the following:    Height as of 3/29/23: 167.6 cm (65.98\").    Weight as of 3/29/23: 142 kg (313 lb).     Const: obese pleasant  female in NAD  Psych: alert, cooperative with exam, good insight, appropriate affect  Eyes: normal conjunctiva, no exophthalmos, no lid lag, no chemosis, anicteric  Neck: no masses. Thyroid non-enlarged  +post fat pad  No moon facies, Not Cushingoid overall  Lymph: no cervical or supraclavicular lymphadenopathy  Resp: CTA bilaterally, normal effort, good air movement throughout  CV: RRR, no murmur, rub, gallop. No edema or myxedema.  MS: normal digits and nails, no kyphosis, no wasting  Neuro: no tremors of outstretched hands DTRs 1+ and symmetrica bilat Ul  Skin: not excessively warm, smooth, or dry; normal hair and eyebrows, no bruising, no abnormal striae  No hirsutism on back. Facial hair- plucked. 2+ hair escutcheon.          Assessment and Plan   Diagnoses and all orders for this visit:    1. Solitary thyroid nodule (Primary)  Comment:hx thyroid nodule, s/p benign FNA 2021  Plan: due for eval  US thyroid- order is in  Check TFT- had baby 2/23 and wt up since. Discussed risk of pp thyroiditis in first 12 mos postpartum    -     T4, Free  -     TSH  -     US Thyroid; Future    2. Abnormal weight gain  Comment:pt with hx obesity since childhood  First tested thyroid age 6  +Fhx obesity  Regaining wt lost during pregnancy  Had neg Cushings eval in past  Told high testosterone in past. +Facial hair. Normal menses. Likely PCOS. " "On metformin- hasn't taken in a few weeks  Plan:   -Needs wt loss assistance  -GLP1 rx would be ideal given obesity and underlying insulin resistance, PCOS- will try for semaglutide 0.25 and increase to 0.5 mg after 4 weeks and work up from there  -Alternative rxs: Saxenda, Qsymia, phentermine- discussed all w/pt in case need to change course  SE and boxed warnings d/w pt, info given for Ozempic. Her sister is nurse, will help her get started.   -If unable to lose significant wt, low threshold for bariatric surgery referral- pt aware/willing to consider.       3. Hyperglycemia  Comment:had abnormal 1 hour OGTT in past, had to do 3 hour  Hx \"hyperglycemia\" on problem list  On metformin  Plan:   Check A1C  GLP1 therapy as above  Ok to stop metformin when starts rx.     -     Hemoglobin A1c    Other orders  -     Semaglutide,0.25 or 0.5MG/DOS, (Ozempic, 0.25 or 0.5 MG/DOSE,) 2 MG/1.5ML solution pen-injector; Inject 0.25 mg weekly for four  weeks, then increase to 0.5 mg weekly if tolerated without nausea.  Dispense: 4.5 mL; Refill: 1              Plan of care reviewed with patient who verbalizes understanding and agrees.  Homa Schaeffer MD [facility]         I spent 70 minutes caring for Belem on this date of service. This time includes time spent by me in the following activities:preparing for the visit, reviewing tests, performing a medically appropriate examination and/or evaluation , counseling and educating the patient/family/caregiver, ordering medications, tests, or procedures, referring and communicating with other health care professionals , documenting information in the medical record, and independently interpreting results and communicating that information with the patient/family/caregiver  Follow Up  Return in about 3 months (around 9/7/2023).  Patient was given instructions and counseling regarding her condition or for health maintenance advice. Please see specific information pulled into the AVS if " appropriate.         ]

## 2023-06-08 LAB
HBA1C MFR BLD: 5.3 % (ref 4.8–5.6)
T4 FREE SERPL-MCNC: 1.01 NG/DL (ref 0.93–1.7)
TSH SERPL DL<=0.005 MIU/L-ACNC: 1.6 UIU/ML (ref 0.27–4.2)

## 2023-06-09 ENCOUNTER — PRIOR AUTHORIZATION (OUTPATIENT)
Dept: ENDOCRINOLOGY | Age: 33
End: 2023-06-09
Payer: COMMERCIAL

## 2023-06-09 NOTE — TELEPHONE ENCOUNTER
6/9/2023 PA SUBMITTED FOR ozempic    Your PA has been resolved, no additional PA is required. For further inquiries please contact the number on the back of the member prescription card.

## 2023-07-17 ENCOUNTER — PATIENT MESSAGE (OUTPATIENT)
Dept: ENDOCRINOLOGY | Age: 33
End: 2023-07-17
Payer: COMMERCIAL

## 2023-07-31 RX ORDER — SEMAGLUTIDE 1.34 MG/ML
INJECTION, SOLUTION SUBCUTANEOUS
Qty: 4.5 ML | Refills: 1 | Status: SHIPPED | OUTPATIENT
Start: 2023-07-31 | End: 2023-08-02

## 2023-08-02 ENCOUNTER — SPECIALTY PHARMACY (OUTPATIENT)
Dept: ENDOCRINOLOGY | Age: 33
End: 2023-08-02
Payer: COMMERCIAL

## 2023-08-02 RX ORDER — SEMAGLUTIDE 0.68 MG/ML
0.5 INJECTION, SOLUTION SUBCUTANEOUS WEEKLY
Qty: 9 ML | Refills: 0 | Status: SHIPPED | OUTPATIENT
Start: 2023-08-02

## 2023-08-03 ENCOUNTER — SPECIALTY PHARMACY (OUTPATIENT)
Dept: ENDOCRINOLOGY | Age: 33
End: 2023-08-03
Payer: COMMERCIAL

## 2023-08-20 DIAGNOSIS — R63.5 ABNORMAL WEIGHT GAIN: Primary | ICD-10-CM

## 2023-08-20 DIAGNOSIS — R73.9 HYPERGLYCEMIA: ICD-10-CM

## 2023-08-23 RX ORDER — SEMAGLUTIDE 0.68 MG/ML
INJECTION, SOLUTION SUBCUTANEOUS
Qty: 3 ML | Refills: 0 | Status: SHIPPED | OUTPATIENT
Start: 2023-08-23

## 2023-08-29 ENCOUNTER — OFFICE VISIT (OUTPATIENT)
Dept: ENDOCRINOLOGY | Age: 33
End: 2023-08-29
Payer: COMMERCIAL

## 2023-08-29 VITALS
TEMPERATURE: 96.8 F | HEART RATE: 81 BPM | HEIGHT: 66 IN | DIASTOLIC BLOOD PRESSURE: 82 MMHG | BODY MASS INDEX: 47.09 KG/M2 | OXYGEN SATURATION: 98 % | SYSTOLIC BLOOD PRESSURE: 128 MMHG | WEIGHT: 293 LBS

## 2023-08-29 DIAGNOSIS — E04.1 SOLITARY THYROID NODULE: ICD-10-CM

## 2023-08-29 DIAGNOSIS — E66.01 CLASS 3 SEVERE OBESITY DUE TO EXCESS CALORIES WITHOUT SERIOUS COMORBIDITY WITH BODY MASS INDEX (BMI) OF 50.0 TO 59.9 IN ADULT: ICD-10-CM

## 2023-08-29 DIAGNOSIS — R63.5 ABNORMAL WEIGHT GAIN: Primary | Chronic | ICD-10-CM

## 2023-08-29 DIAGNOSIS — R73.9 HYPERGLYCEMIA: ICD-10-CM

## 2023-08-29 RX ORDER — SEMAGLUTIDE 1.34 MG/ML
1 INJECTION, SOLUTION SUBCUTANEOUS WEEKLY
Qty: 9 ML | Refills: 2 | Status: SHIPPED | OUTPATIENT
Start: 2023-08-29

## 2023-08-29 NOTE — PROGRESS NOTES
Chief Complaint  solitary thyroid nodule       HPI:  Belem Mcknight presents to [facility] for fu  thyroid, weight      1) MNG  Hx nodules  Thyroid enlargement x 10 years- eventually had US  Saw endo in , had FNA    no XRT  Mom Fhx thyroid dz- hypothyroidism- recent dx      + MVI- prenatals  no Biotin    Weight : as below  S/p csection   , 2 sections  Tried nursing, not enough milk production    Heart: no palps  Bowel movements: fine, takes miralax  Energy : good  Heat/cold intolerance: no issues  Menses: resumed after delivery- last cycle last month, more normal    no dysphagia  no choking when supine  no anterior neck pain  no hoarseness/voice change    Lab Results   Component Value Date    TSH 1.600 2023       Hx US:  US was done near urgent care  Scanned in, unable to to view (in Care Everywhere)    7/10/23 US at [facility]    Narrative & Impression   Examination: Thyroid sonogram     TECHNIQUE: Sonographic images of the thyroid gland were obtained     HISTORY: Nodules     COMPARISON: 2019     FINDINGS: The right thyroid lobe measures 4.8 x 1.8 x 1.6 cm, and the  left thyroid lobe measures 4.1 x 1.5 x 1.1 cm, with 4 mm isthmus  thickness. A solid appearing isoechoic well marginated wider than tall  right thyroid nodule without microcalcification measures 1.1 x 1 x 0.6  cm, stable.     IMPRESSION:  Right thyroid nodule, tirads-3. No further follow-up is  necessary.       Hx FNAs  19 Radiology  R 1.5 cm heterog nodule- benign, Adena II    CLINICAL HISTORY:   Right thyroid nodule 1.26 x 1.47 x 0.7cm   Pre-Operative Diagnosis:  Right thyroid nodule     SPECIMEN ADEQUACY:   Satisfactory for evaluation     CYTOLOGIC DIAGNOSIS:   A) Right thyroid nodule, fine needle aspiration (10 smears only):   - Benign (Adena category II).   - Benign appearing follicular cells in a background of blood and colloid, consistent with a    benign follicular nodule.      Electronically signed by DONNA GUERRA MD-PAT   Verified: 07/30/21      2) Weight  S/p C section 2/16/23  Hx Morbid obesity prior to pregnancy- BMI 54    Lost during pregnancies. Lost 65 in first pregnancy. Lost 20# in second pregnancy, regained 5#. Now at 3rd trimester wt. After delivery down to 305, 332 last visit. Now 337- cycling the same 4# or so    Hx high testosterone in past  +Facial hair- lip/beard- plucks  No menstrual issues  Menarche 7, regular  On metformin per OB- hoping it would help her lose weight- off now since before initial visit with me. Had stopped/restarted, horrible GI upset.     Hx Cushings in cousin  Hx extreme obesity in family  Has had eval for Cushings with diabetic specialist in past. Negative. Age 18-20 or so. Has had ongoing weight issues since elementary school.     Currently taking Ozempic 0.5 mg weekly, just had 3rd dose.   No GI upset  Weight not better yet  Decreased appetite on rx- lower appetite for 1st 3 days  Eating less often and less quantity but no weight loss yet.   Fluctuates 4 #.     Wt loss attempts:  Calorie deficit  Low carb (got gallstones)  Gym 5 days a week for a year  Free version of Weight Watchers  Started looking at lap band in past- in [id number]      Medical Records Reviewed:    3/19 US thyroid  HISTORY: 28-year-old female with a history of an enlarged thyroid.    FINDINGS: Sonographic evaluation of the thyroid was performed. No prior  thyroid sonogram is available for comparison. The right lobe of thyroid  measures 4.7 x 1.5 x 1.8 cm and the left lobe measures 3.8 x 0.9 x 1.7  cm. The isthmus measures 0.4 cm in thickness. In the right lobe of the  thyroid, there is a 1 cm mixed cystic and solid nodule. No other nodules  are visualized. No other abnormality of the thyroid is appreciated.    IMPRESSION:  1 cm mixed cystic and solid nodule in the right lobe of the  thyroid, otherwise normal thyroid sonogram.       7/21 FNA R nodule  CLINICAL HISTORY:   Right  "thyroid nodule 1.26 x 1.47 x 0.7cm   Pre-Operative Diagnosis:  Right thyroid nodule     SPECIMEN ADEQUACY:   Satisfactory for evaluation     CYTOLOGIC DIAGNOSIS:   A) Right thyroid nodule, fine needle aspiration (10 smears only):   - Benign (Cecil category II).   - Benign appearing follicular cells in a background of blood and colloid, consistent with a    benign follicular nodule.     Electronically signed by DONNA GUERRA MD-PAT   Verified: 07/30/21       MEDICATIONS  Current Outpatient Medications   Medication Sig Dispense Refill    metFORMIN ER (GLUCOPHAGE-XR) 750 MG 24 hr tablet Take 1 tablet by mouth Daily With Breakfast. (Patient not taking: Reported on 6/7/2023) 30 tablet 11    norethindrone (MICRONOR) 0.35 MG tablet Take 1 tablet by mouth Daily. 84 tablet 3    Oxymetazoline HCl (NASAL SPRAY SINUS NA) into the nostril(s) as directed by provider.      Ozempic, 0.25 or 0.5 MG/DOSE, 2 MG/3ML solution pen-injector Inject 0.25 mg under the skin weekly for 4 weeks then increase to inject 0.5 mg under the skin weekly if tolerated without nausea. 3 mL 0    Prenatal-FeFum-FA-DHA w/o A (PRENATAL + DHA PO) Take  by mouth.      Semaglutide,0.25 or 0.5MG/DOS, (Ozempic, 0.25 or 0.5 MG/DOSE,) 2 MG/3ML solution pen-injector Inject 0.25 mg under the skin Every 7 (Seven) Days for 28 days, THEN Increase to 0.5 mg Every 7 (Seven) Days if Tolerated without Nausea 3 mL 0     No current facility-administered medications for this visit.       Physical Exam:  Vital Signs:  /82   Pulse 81   Temp 96.8 øF (36 øC) (Temporal)   Ht 167.6 cm (65.98\")   Wt (!) 153 kg (337 lb 12.8 oz)   SpO2 98%   BMI 54.55 kg/mý   Estimated body mass index is 54.55 kg/mý as calculated from the following:    Height as of this encounter: 167.6 cm (65.98\").    Weight as of this encounter: 153 kg (337 lb 12.8 oz).     Const: obese pleasant  female in NAD  Psych: alert, cooperative with exam, good insight, appropriate affect  Neck: no masses. " "Thyroid non-enlarged  Lymph: no cervical or supraclavicular lymphadenopathy  Resp: CTA bilaterally, normal effort, good air movement throughout  CV: RRR, no murmur, rub, gallop. No edema or myxedema.     Assessment and Plan   Diagnoses and all orders for this visit:    1. Solitary thyroid nodule (Primary)  Comment:hx thyroid nodule, s/p benign FNA 2021  s/p FU US 7/23- stable isoechoic nodule, 1.1 cm (actually slightly smaller from measurements at 2021 FNA)  Plan:  Repeat US about 3-5 years or with clinical change      2. Abnormal weight gain  Comment:pt with hx obesity since childhood  First tested thyroid age 6  +Fhx obesity  Regaining wt lost during pregnancy  Had neg Cushings eval in past  Told high testosterone in past. +Facial hair. Normal menses. Likely PCOS. On metformin- hasn't taken in a few weeks  Currently taking Ozempic, 0.5 mg and tolerating, no sig weight benefit to date  Plan:   -increase Ozempic to 1 mg.  -FU 3-4 months.    -If unable to lose significant wt, low threshold for bariatric surgery referral- pt aware/very willing to consider.       3. Hyperglycemia  Comment:had abnormal 1 hour OGTT in past, had to do 3 hour  Hx \"hyperglycemia\" on problem list  On metformin in past, now off (GI upset)  Lab Results   Component Value Date    HGBA1C 5.30 06/07/2023   Plan:   GLP1 therapy as above        Plan of care reviewed with patient who verbalizes understanding and agrees.  Homa Schaeffer MD FACE [facility]       Follow Up  Return in about 14 weeks (around 12/5/2023).  Patient was given instructions and counseling regarding her condition or for health maintenance advice. Please see specific information pulled into the AVS if appropriate.         ]  "

## 2023-08-29 NOTE — PATIENT INSTRUCTIONS
"Increase Ozempic to 1 mg weekly with next refill    Please schedule your follow up appointment for 3-4 months    TEST RESULTS:  Endocrinology is a specialty that relies heavily on interpretation of labs and other testing while also considering your personal medical history. This is best done in conjunction with an office visit, ideally with labs drawn prior to the visit so that they may be discussed in person. Any tests completed more than 7 days after your visit will require a further follow up visit for review and interpretation. This visit may be arranged as a telemedicine visit, in person, or and e-visit, depending on clinician availability when results become available.?Test Performed at this [facility] I will inform you of your test results-normal or otherwise. Please wait for a letter or a phone call. If for some reason you do not receive a letter of phone call about your results within 3 weeks after your test date, it is very important for you to contact our office.?Test Performed Outside of this facility: If you decide to have your tests performed outside this facility, it is your responsibility to contact us to confirm that the results have been received and reviewed by me.?    When you review your labs, there may be things that are noted slightly out of range that are not remarked upon by your physician. Please do not be alarmed! Many of these things are seen in some portion of a normal healthy population. Other \"abnormalities\" are within lab error range. Still others are \"calculations\" rather than actual lab values (example: BUN/Cr ratio) and the individual components in the calculation are just fine.?Labs done outside this [facility] will not be routinely resulted in MyChart or visible for viewing there. Occasionally a few selected outside labs will be hand-entered into our computer system (for tracking purposes), in which case you will receive notification of a result. However, the entire " "battery of tests will still not be visible as outside labs are scanned into the EPIC system and do not appear in the lab results section. The same is true for any other tests or imaging studies. If you would like to view your results on [facility], please be sure to have your tests done at this facility?Because your labs are now being automatically \"released\" by a computer system, it is possible you may receive notification at unusual times of day. Do not be concerned- you are not being contacted at odd hours because there is an emergency! Your test results will continue to be reviewed by your physician and results requiring follow up action will lead to a phone call from our office notifying you of such a concern. Conversely, please do not call the office after hours to address your test results. Please be aware that if you are notified of test results or messages via [facility], it is your responsibility to log in to look at them. If no special intervention is needed after testing is done, you will not receive any additional contact from the office (i.e. Calls, letters, or messages). If I your results are normal, I am able to see that you have reviewed them and no further action is needed, you will not be called or contacted by the office.     APPOINTMENTS:  Once you schedule an appointment, the responsibility to keep it becomes yours. Please be sure to give 24 hours notice when calling the office to cancel an appointment, as less notice will also be treated as a \"no show.\" It is your responsibility to call to reschedule any missed or canceled appointments.? Excessive no-show visits will result in dismissal from the practice.    PRESCRIPTIONS:  Bring all of your medications to each visit and request the necessary refills at that time. Please allow 48 business hours for any refills requested outside of an office visit. Prescriptions will not be refilled after business hours or on weekends, so plan accordingly. Mail-order " prescriptions can be electronically prescribed for you; if your mail-order pharmacy does not have this capability, you will need to mail in your written prescription.?    DIABETICS:  If you take medications to lower your glucoses, remember to always test your glucose prior to driving or operating any machinery.?Do not get pregnant without first normalizing your glucoses and discussing your plans with your physician.?Bring your glucose log book to all appointments at our office.    SCOPE OF MEDICAL CARE:  -Dr. Schaeffer is caring for you as your ENDOCRINOLOGIST. She does not function as a primary care provider for her patients. All patients are expected to have an internal medicine or family medicine physician to provide non-endocrine care, which also includes urgent care, annual physicals, cancer screenings, and preoperative clearance.    **Information for MyChart Usage**?    Prescription refills:  For prescription refills, please do not send a message/request as a note to your doctor- it will actually delay the process! The best way to get a prescription refill is to ask your pharmacy to contact our office. They will then do so electronically. Please leave 48 business hours for all refills.?    Messages to the office/your physician:  We will attempt to answer messages within 2 business days. Please do not message your physician with new symptoms or concerns about possible medication side effects. It is always possible that your symptoms are more serious than you even perceive and we would like to be certain they are addressed promptly! What other type of questions may not best for MyChart? If you have a complicated question that requires more than a 2 sentence response, or multiple questions, you may be asked to make a follow up appointment. Review of test results done more than 7 days after your office visit will require an additional follow up visit to address and respond to questions. MyChart isn't a substitute  for your personalized office visits.

## 2023-08-31 ENCOUNTER — PATIENT MESSAGE (OUTPATIENT)
Dept: OBSTETRICS AND GYNECOLOGY | Facility: CLINIC | Age: 33
End: 2023-08-31
Payer: COMMERCIAL

## 2023-08-31 RX ORDER — ACETAMINOPHEN AND CODEINE PHOSPHATE 120; 12 MG/5ML; MG/5ML
1 SOLUTION ORAL DAILY
Qty: 84 TABLET | Refills: 3 | Status: SHIPPED | OUTPATIENT
Start: 2023-08-31 | End: 2024-08-30

## 2023-09-11 ENCOUNTER — SPECIALTY PHARMACY (OUTPATIENT)
Dept: ENDOCRINOLOGY | Age: 33
End: 2023-09-11
Payer: COMMERCIAL

## 2023-09-11 NOTE — PROGRESS NOTES
Specialty Pharmacy Refill Coordination Note     Belem is a 32 y.o. female contacted today regarding refills of  2 specialty medication(s).    Reviewed and verified with patient:       Specialty medication(s) and dose(s) confirmed: yes    Refill Questions      Flowsheet Row Most Recent Value   Changes to allergies? No   Changes to medications? No   New conditions since last clinic visit No   Unplanned office visit, urgent care, ED, or hospital admission in the last 4 weeks  No   How does patient/caregiver feel medication is working? Good   Financial problems or insurance changes  No   Since the previous refill, were any specialty medication doses or scheduled injections missed or delayed?  No   Does this patient require a clinical escalation to a pharmacist? No            Delivery Questions      Flowsheet Row Most Recent Value   Delivery method FedEx   Delivery address correct? Yes   Delivery phone number [phone]   Number of medications in delivery 2   Medication being filled and delivered ozempic, norethindrone   Doses left of specialty medications 1w   Is there any medication that is due not being filled? No   Cooler needed? Yes   Do any medications need mixed or dated? No   Copay form of payment Credit card on file   Additional comments $50   Questions or concerns for the pharmacist? No   Are any medications first time fills? No   Shipment status Cooler packed, Delivery complete                   Follow-up: 25 day(s)     Cee Laurent, Pharmacy Technician  Specialty Pharmacy Technician

## 2023-10-25 ENCOUNTER — SPECIALTY PHARMACY (OUTPATIENT)
Dept: ENDOCRINOLOGY | Age: 33
End: 2023-10-25
Payer: COMMERCIAL

## 2023-10-25 NOTE — PROGRESS NOTES
Specialty Pharmacy Refill Coordination Note     Belem is a 32 y.o. female contacted today regarding refills of  1 specialty medication(s).    Reviewed and verified with patient:       Specialty medication(s) and dose(s) confirmed: yes      Patient did not need at this time, will check back in next week for refills and updated insurance.              Follow-up: 7 day(s)     Cee Laurent, Pharmacy Technician  Specialty Pharmacy Technician

## 2023-11-01 ENCOUNTER — SPECIALTY PHARMACY (OUTPATIENT)
Dept: ENDOCRINOLOGY | Age: 33
End: 2023-11-01
Payer: COMMERCIAL

## 2023-11-01 NOTE — PROGRESS NOTES
Specialty Pharmacy Refill Coordination Note     Belem is a 32 y.o. female contacted today regarding refills of  1 specialty medication(s).    Reviewed and verified with patient:       Specialty medication(s) and dose(s) confirmed: yes    Refill Questions      Flowsheet Row Most Recent Value   Changes to allergies? No   Changes to medications? No   New conditions since last clinic visit No   Unplanned office visit, urgent care, ED, or hospital admission in the last 4 weeks  No   How does patient/caregiver feel medication is working? Good   Financial problems or insurance changes  No   Since the previous refill, were any specialty medication doses or scheduled injections missed or delayed?  No   Does this patient require a clinical escalation to a pharmacist? No            Delivery Questions      Flowsheet Row Most Recent Value   Delivery method Other (Comment)  [beeline]   Delivery address correct? Yes   Delivery phone number [phone]   Preferred delivery time? Anytime   Number of medications in delivery 1   Medication(s) being filled and delivered Norethindrone (Progestin Contraceptives - Oral)   Doses left of specialty medications 1 week   Is there any medication that is due not being filled? No   Supplies needed? No supplies needed   Cooler needed? No   Do any medications need mixed or dated? No   Copay form of payment Credit card on file   Additional comments $0   Questions or concerns for the pharmacist? No   Are any medications first time fills? No   Shipment status Delivery complete                   Follow-up: 25 day(s)     Cee Laurent, Pharmacy Technician  Specialty Pharmacy Technician

## 2023-11-02 ENCOUNTER — TELEPHONE (OUTPATIENT)
Dept: ENDOCRINOLOGY | Age: 33
End: 2023-11-02

## 2023-11-02 NOTE — TELEPHONE ENCOUNTER
Wegovy is difficult to get at this time, especially the starting doses.  I checked with [facility] and they only have the higher doses.  She would need to start at a lower dose and work her way up.  If she can find a pharmacy that has it then I will send it.

## 2023-11-02 NOTE — TELEPHONE ENCOUNTER
Caller: Belem Mcknight    Relationship: Self    Best call back number: [phone]    Requested Prescriptions: Semaglutide, 1 MG/DOSE, (Ozempic, 1 MG/DOSE,) 4 MG/3ML solution pen-injector   Requested Prescriptions      No prescriptions requested or ordered in this encounter        Pharmacy where request should be sent: [facility].      Last office visit with prescribing clinician: Visit date not found   Last telemedicine visit with prescribing clinician: Visit date not found   Next office visit with prescribing clinician: 12/1/2023     Additional details provided by patient: PT HAS CHANGED INSURANCE AND NOW THEY WILL NOT COVER HER OZEMPIC. PT WOULD LIKE TO FIND OUT IF SHE CAN SWITCH TO WAGOVY SINCE THE INSURANCE WILL COVER THIS MEDICATION. PT HASN'T HAD A SHOT IN 3 WEEKS.     Does the patient have less than a 3 day supply:  [x] Yes  [] No    Would you like a call back once the refill request has been completed: [x] Yes [] No    If the office needs to give you a call back, can they leave a voicemail: [x] Yes [] No    Alejandra Packer Rep   11/02/23 15:24 EDT

## 2023-11-03 ENCOUNTER — TELEPHONE (OUTPATIENT)
Dept: ENDOCRINOLOGY | Age: 33
End: 2023-11-03
Payer: COMMERCIAL

## 2023-11-03 DIAGNOSIS — R63.5 ABNORMAL WEIGHT GAIN: Primary | ICD-10-CM

## 2023-11-03 NOTE — TELEPHONE ENCOUNTER
Patient called asking if we can switch from ozempic to wegovy. Her insurance covers Wegovy. Can we send it to [facility] [place] pharmacy on the first floor?

## 2023-11-03 NOTE — TELEPHONE ENCOUNTER
I called and informed the patient regarding her new prescription. Will start Wegovy 1 mg weekly as Ozempic is not covered by her insurance. Patient is able to tolerate GLP1 agonist , has not ha any GI side effects. Was instructed to stop taking Ozempic and call the office if she had any questions, patient verbalized understanding.

## 2023-11-07 ENCOUNTER — SPECIALTY PHARMACY (OUTPATIENT)
Dept: ENDOCRINOLOGY | Age: 33
End: 2023-11-07
Payer: COMMERCIAL

## 2023-11-07 NOTE — PROGRESS NOTES
Specialty Pharmacy Patient Management Program  Endocrinology Refill Outreach      Belem is a 32 y.o. female contacted today regarding refills of her medication(s).    Specialty medication(s) and dose(s) confirmed: Wegovy 1 mg   Other medication(s) being refilled: N/A    Refill Questions      Flowsheet Row Most Recent Value   Changes to allergies? No   Changes to medications? Yes  [Switch from Ozempic to Wegovy]   New conditions since last clinic visit No   Unplanned office visit, urgent care, ED, or hospital admission in the last 4 weeks  No   How does patient/caregiver feel medication is working? Good   Financial problems or insurance changes  No   Since the previous refill, were any specialty medication doses or scheduled injections missed or delayed?  No   Does this patient require a clinical escalation to a pharmacist? No          Delivery Questions      Flowsheet Row Most Recent Value   Delivery method Other (Comment)  [Beeline]   Delivery address correct? Yes   Delivery phone number [phone]   Preferred delivery time? Anytime   Number of medications in delivery 1   Medication(s) being filled and delivered Semaglutide-Weight Management   Doses left of specialty medications New start   Is there any medication that is due not being filled? No   Supplies needed? No supplies needed   Cooler needed? Yes   Do any medications need mixed or dated? No   Copay form of payment Credit card on file   Additional comments $161.36   Questions or concerns for the pharmacist? No   Are any medications first time fills? No                Follow-Up: About 3 weeks for future fills    Eliza North PharmD, MM   Clinical Speciality Pharmacist, Endocrinology   11/7/2023  11:47 EST

## 2023-12-04 ENCOUNTER — SPECIALTY PHARMACY (OUTPATIENT)
Dept: ENDOCRINOLOGY | Age: 33
End: 2023-12-04
Payer: COMMERCIAL

## 2023-12-04 NOTE — PROGRESS NOTES
Specialty Pharmacy Refill Coordination Note     Belem is a 33 y.o. female contacted today regarding refills of  1 specialty medication(s).    Reviewed and verified with patient:       Specialty medication(s) and dose(s) confirmed: yes    Refill Questions      Flowsheet Row Most Recent Value   Changes to allergies? No   Changes to medications? No   New conditions since last clinic visit No   Unplanned office visit, urgent care, ED, or hospital admission in the last 4 weeks  No   How does patient/caregiver feel medication is working? Good   Financial problems or insurance changes  No   Since the previous refill, were any specialty medication doses or scheduled injections missed or delayed?  No   Does this patient require a clinical escalation to a pharmacist? No            Delivery Questions      Flowsheet Row Most Recent Value   Delivery method Other (Comment)  [beeline]   Delivery address correct? Yes   Delivery phone number [phone]   Preferred delivery time? Anytime   Number of medications in delivery 1   Medication(s) being filled and delivered Norethindrone (Progestin Contraceptives - Oral)   Doses left of specialty medications 1 week   Is there any medication that is due not being filled? No   Supplies needed? No supplies needed   Cooler needed? No   Do any medications need mixed or dated? No   Copay form of payment Credit card on file   Additional comments $0   Questions or concerns for the pharmacist? No   Are any medications first time fills? No   Shipment status Delivery complete                   Follow-up: 28 day(s)     Cee Laurent, Pharmacy Technician  Specialty Pharmacy Technician

## 2023-12-05 ENCOUNTER — E-VISIT (OUTPATIENT)
Dept: FAMILY MEDICINE CLINIC | Facility: TELEHEALTH | Age: 33
End: 2023-12-05
Payer: COMMERCIAL

## 2023-12-05 DIAGNOSIS — M54.50 ACUTE BILATERAL LOW BACK PAIN WITHOUT SCIATICA: Primary | ICD-10-CM

## 2023-12-05 RX ORDER — METHYLPREDNISOLONE 4 MG/1
TABLET ORAL
Qty: 21 TABLET | Refills: 0 | Status: SHIPPED | OUTPATIENT
Start: 2023-12-05

## 2023-12-05 NOTE — E-VISIT TREATED
Chief Complaint: Low back pain   Patient introduction   Patient is 33-year-old female with pain on or along the spine. Back pain is not work-related. Not receiving disability compensation related to their back pain. No lawsuit regarding their back pain.   Warning. The following may warrant further investigation:    Bilateral radicular pain; pain pattern consistent with L2-L3-L4 involvement; no numbness    Bilateral leg weakness that is not getting worse; patient is unable to squat down and then rise to a standing position, but this is normal for patient   General presentation   Low back pain for less than 1 week. Patient has severe pain that limits some everyday activities.   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 Pain is not worse at night or when supine. Regarding suspected cause of their back pain, patient writes: Coughing. Alleviating factors include resting/lying down and stretching. Aggravating factors include sitting for an extended period, standing for an extended period, physical activity/exercise, bending over, straightening up, twisting to the side, leaning to the side, and coughing/sneezing.   Prior back pain was similar to current symptoms. Last episode of low back pain was more than 6 months ago.   History of spinal/back procedures in the last year, but none in the last 3 months.   Urinary symptoms    No burning with urination    No frequent urination    No blood in urine   Treatments tried for current symptoms:   The following treatments were not helpful for current symptoms:    Acetaminophen    Chiropractic treatment    Heat    Ibuprofen (400 mg bid)    Ice    Massage    Naproxen sodium (1 pill qd)    Non-prescription topical pain relievers   Review of red flags/alarm symptoms:    No spinal pain along with long-term oral steroid use, an associated bruise or scrape, or a history of osteoporosis    Back pain has been present for less than  6 weeks    No back pain due to significant trauma    No urinary incontinence, urinary retention, or fecal incontinence    No history of osteoporosis    No pelvic or lower abdominal pain    No nausea or vomiting    No pain that is worse after eating    No difficulty walking or staying upright as a result of back pain    No bacteremia, sepsis, or endocarditis    No hemodialysis within the last 2 months    No injection drug use in the last 6 months    No fever, severe fatigue, unintentional weight loss, or drenching night sweats    No history of cancer   Self-exam:    Patient is unable to squat down and then rise to a standing position but this is normal for them (L4 motor screening).    Patient can walk on heels for 10 steps but they struggle to keep left toes up (L5 motor screening).    Patient can walk on toes for 10 steps without difficulty (S1 motor screening).   Prognosis and risk stratification:   Patient may be willing to do physical therapy, depending on cost and time commitment.   Pregnancy/menstrual status/breastfeeding:   Not pregnant. Not breastfeeding. Regarding date of last menstrual period, patient writes: 11/23-11/28.   Current medications   Currently taking NASAL SPRAY SINUS NA, norethindrone 0.35 MG tablet, and Wegovy 1 MG/0.5ML solution auto-injector.   Medication allergies   None.   Medication contraindication review   No history of heart block/conduction disorders/arrhythmias, ASA- or NSAID-induced asthma or urticaria, aspirin-exacerbated respiratory disease (AERD), congestive heart failure, hyperthyroidism, recent CABG surgery, or recent myocardial infarction.   Past medical history   Immune conditions: No immunocompromising conditions.   No history of cancer.   Patient-submitted comments   Patient was asked if they had anything to add about their symptoms. Patient writes: The pain in in my lumbar area, it's making it hard to do anything that involves my pelvis (which is most things) I can only  lift my right leg a few inches. I don't have sciatica yet but the way my pelvis a low hurt, it's coming..   Patient did not request an excuse note.   Assessment   Low back pain.   This is the likely diagnosis based on patient's interview responses.   Plan   Medications:    ibuprofen 800 mg tablet RX 800mg 1 tab PO q8h PRN 14d for pain. Take with food to avoid an upset stomach. Do not exceed 3200 mg (4 tabs) in a 24-hour period. Amount is 42 tab.    cyclobenzaprine 10 mg tablet RX 10mg 1 tab PO q8h PRN 5d for spasms, cramping, or tightness of low back muscles. May cause significant drowsiness. Do not take before driving or operating machinery. Amount is 15 tab.   The patient's prescriptions will be sent to:   [facility]   [address] [place] [id number]   Phone: [phone]     Fax: [phone]   Education:    Condition and causes    Prevention    Treatment and self-care    When to call provider   ----------   Electronically signed by ARIES Youngblood on 2023-12-05 at 09:10AM   ----------   Patient Interview Transcript:   Where on your lower back do you feel pain? This interview treats low back pain only. Select one.    Along the spine or bony part of my back   Not selected:    Right side    Left side    Both sides   Since your back pain started, have you had any of these stomach- or bladder-related symptoms? Select all that apply.    None of these   Not selected:    Abdominal pain or discomfort    Nausea    Vomiting    Pain that's worse after eating    Pelvic or lower abdominal pain    Burning with urination    Frequent urination    Blood in your urine   Do any of these statements apply to you? Select all that apply.    None of the above   Not selected:    I've been taking oral steroids such as prednisone for a long time    I have a bruise or scrape on my spine where the pain is    I have osteoporosis   How long have you had your current episode of back pain? Select one.    Less than 1  week   Not selected:    1 to 2 weeks    3 to 4 weeks    5 to 6 weeks    More than 6 weeks   What does the pain feel like? Select one.    Other (please describe): Both, I'm pretty sure I pulled a muscle while coug   Not selected:    Dull or aching    Sharp, shooting, stabbing, or burning   How severe is your back pain? Think about how the pain affects your everyday activities. On a scale of 1 to 10, for example, how difficult is it to get out of bed, get dressed, shower, or go to work or school? Select one.    Severe, 7 to 9; my pain is distressing, and it limits me from doing some everyday activities   Not selected:    Mild, 1 to 3; my pain is noticeable and distracting, but I am still able to do everyday activities    Moderate, 4 to 6; my pain is strong, and it makes everyday activities uncomfortable    Unbearable, 10+; my pain is so intense that it keeps me from doing almost all everyday activities   Does the pain travel down from your lower back to your buttock, thigh, lower leg, or foot? Select one.    Yes, it travels down both sides   Not selected:    Yes, it travels down my right side    Yes, it travels down my left side    No   Which figure best matches the pattern of your pain? Your pain may or may not travel all the way down to the foot. Choose the figure that best matches the areas where you're having pain.    Figure 1   Not selected:    Figure 2    Figure 3    None of these   Since your back pain started, have you had numbness in the areas shown on these figures? Your numbness may or may not travel all the way down to the foot. Choose the figure that best matches the area where you're having numbness.    I don't have any numbness   Not selected:    Figure 1    Figure 2    Figure 3    My numbness doesn't match any of these   Does your back pain get worse at night or when you're lying down? Select one.    No   Not selected:    Yes   Do any of these make your back pain worse? Select all that apply.     Sitting in one position for a long time    Being on my feet for a long time    Physical activity or exercise    Bending over    Standing up from a bent over position    Twisting to the side    Leaning to the side    Coughing or sneezing   Not selected:    None of the above   Do any of these help your back feel better? Select all that apply.    Resting or lying down    Stretching   Not selected:    Frequently changing positions    Physical activity or exercise    None of the above   Since your back pain started, have you stumbled or fallen because of problems with balance or coordination? Select one.    No   Not selected:    Yes    I have an underlying condition that prevents me from standing or walking    I have an underlying condition that causes problems with my balance or coordination   Along with your back pain, have you noticed a loss of strength in your legs? Select one.    Yes, in both legs   Not selected:    Yes, but only in one leg    No    I have limited or no strength in my legs because of a chronic underlying condition   Is the leg weakness getting worse? Select one.    No   Not selected:    Yes   Since your back pain started, have you had any of these symptoms? Back pain doesn't usually come with other symptoms that affect your whole body. Select all that apply.    None of these   Not selected:    Unexplained fever    Severe fatigue that doesn't improve with rest    Unintentional weight loss    Drenching night sweats   Since your back pain began, have you noticed any loss of bowel or bladder function? This includes urinating or having a bowel movement when you didn't mean to. It also includes feeling like you can't urinate or empty your bladder all the way. Select one.    No   Not selected:    Yes   Can you squat down and then rise to a standing position?    No, both my legs are too weak   Not selected:    Yes, with ease    Yes, but my right leg seems weaker than my left leg    Yes, but my left leg  "seems weaker than my right leg    No, my right leg is too weak    No, my left leg is too weak   Is this something you're normally able to do? Select one.    No   Not selected:    Yes   Can you walk on your heels for at least 10 steps?    Yes, but it's hard to keep my left toes up   Not selected:    Yes, with ease    Yes, but it's hard to keep my right toes up    No, because I can't keep my right toes up high enough    No, because I can't keep my left toes up high enough    No, because I can't keep my toes up on both the right and left sides   Can you walk on your toes for at least 10 steps?    Yes, with ease   Not selected:    Yes, but it's harder to keep my right heel up    Yes, but it's harder to keep my left heel up    No, because I can't keep my right heel up high enough    No, because I can't keep my left heel up high enough    No, because I can't keep my heels up on both my right and left sides   The next few questions ask you about what may have caused your back pain. Was your back pain triggered by any of these activities? Select all that apply.    Another activity (specify): Coughing   Not selected:    Heavy lifting    Bending over    A new sport or activity    An awkward position    Intense exercise    A twisting motion    No, not that I'm aware of   Is your back pain the result of a serious injury, fall, or accident? A serious injury or fall might include falling off a ladder more than 10 feet high or being involved in a car accident more serious than a \"fender germain.\" Select one.    No   Not selected:    Yes   Is your back pain work-related? This includes injuries suffered at work and symptoms caused by repetitive activities at work (such as overuse injuries). Select one.    No   Not selected:    Yes   Within the last year, have you had any medical procedures done on your spine or back? Examples include back or spine surgeries, spinal injections, epidural injections, and epidural catheter placement. " Select one.    Yes   Not selected:    No   How long ago was the spine or back procedure done? Select one.    More than 3 months ago   Not selected:    Within the last month    1 to 3 months ago   Have you recently been treated for bacteremia, sepsis, or endocarditis? Bacteremia is a condition where bacteria is found in the blood. Sepsis can be a serious, life-threatening complication of bacteremia. Endocarditis is an infection of the lining of the heart and heart valves. Select one.    No   Not selected:    Yes   Have you had hemodialysis within the last 2 months? Hemodialysis (also known as dialysis) is a treatment for kidney failure. Select one.    No   Not selected:    Yes   In the last 6 months, have you used any injection drugs, such as heroin, cocaine, crystal meth, amphetamines, or opiates? Using injection drugs can put you at risk for serious infections of the spine and surrounding tissues. Your response to this question will only be shared with your healthcare provider. Select one.    No   Not selected:    Yes   Have you had low back pain before? Select one.    Yes, and it felt similar   Not selected:    Yes, but it felt different    No   When was the last time you had low back pain? Select one.    More than 6 months ago   Not selected:    Within the last month    1 to 3 months ago    4 to 6 months ago   Are you currently receiving any disability compensation related to your back pain? If so, you may be instructed to follow up with your treating physician. Select one.    No   Not selected:    Yes   Are you currently involved in a lawsuit associated with your back pain? Have you filed any legal action regarding your back pain? If so, you may be instructed to follow up with your treating physician. Select one.    No   Not selected:    Yes   Are you pregnant? Select one.    No   Not selected:    Yes   When was your last menstrual period? If you don't currently have periods or no longer have periods, please  briefly explain.    11/23-11/28   Are you breastfeeding? Select one.    No   Not selected:    Yes   Do you have any of these conditions that can affect the immune system? Scroll to see all options. Select all that apply.    None of these   Not selected:    History of bone marrow transplant    Chronic kidney disease    Chronic liver disease (including cirrhosis)    HIV/AIDS    Inflammatory bowel disease (Crohn's disease or ulcerative colitis)    Lupus    Moderate to severe plaque psoriasis    Multiple sclerosis    Rheumatoid arthritis    Sickle cell anemia    Alpha or beta thalassemia    History of solid organ transplant (kidney, liver, or heart)    History of spleen removal    An autoimmune disorder not listed here (specify)    A condition requiring treatment with long-term use of oral steroids (such as prednisone, prednisolone, or dexamethasone) (specify)   Have you ever been diagnosed with cancer? Select one.    No   Not selected:    Yes, I have cancer now    Yes, but I'm in remission   Have you had gastric bypass surgery? Select one.    No   Not selected:    Yes   Have you tried any treatments for your low back pain? Select one.    Yes   Not selected:    No   Acetaminophen (Tylenol)    Not helpful   Not selected:    Helpful   Chiropractic treatment    Not helpful   Not selected:    Helpful   Heat    Not helpful   Not selected:    Helpful   Ibuprofen (Advil, Motrin)    Not helpful   Not selected:    Helpful   Ice    Not helpful   Not selected:    Helpful   Massage    Not helpful   Not selected:    Helpful   Naproxen sodium (Aleve)    Not helpful   Not selected:    Helpful   Non-prescription topical pain relievers (Icy Hot, BenGay, or Vicks VapoRub)    Not helpful   Not selected:    Helpful   What dose of ibuprofen (Advil, Motrin) are you taking? The dose is the total number of milligrams you take each time. For example, if you take two 200 mg pills at a time, your dose is 400 mg. Select one.    400 mg   Not  selected:    100 mg    200 mg    600 mg    800 mg    Other (please specify)   How often are you taking ibuprofen (Advil, Motrin)? Select one.    2 times a day   Not selected:    1 time a day    3 times a day    4 times a day    More than 4 times a day   How many pills of naproxen sodium (Aleve) do you take each time? Select one.    1 pill   Not selected:    2 pills    Other (please specify)   How often are you taking naproxen sodium (Aleve)? Select one.    1 time a day   Not selected:    2 times a day    More than 2 times a day   If recommended by your provider, would you be willing to try physical therapy? Physical therapy may include hands-on therapy, strength and flexibility exercises, and posture training. We'll keep in mind your preferences when creating a treatment plan. Select all that apply.    Maybe, depending on the cost    Maybe, depending on the time commitment   Not selected:    Yes    No   Let's make sure the medications in your treatment plan are safe for you to take. Are you being treated for any of these conditions? Select all that apply.    None of the above   Not selected:    Arrhythmias, heart block, or conduction disorders    Aspirin- or NSAID-induced asthma or urticaria    Aspirin-exacerbated respiratory disease (AERD)    Congestive heart failure    Hyperthyroidism    Recent coronary artery bypass graft (CABG) surgery    Recent heart attack   Have you taken any monoamine oxidase inhibitor (MAOI) medications in the last 14 days? Examples include rasagiline (Azilect), selegiline (Eldepryl, Zelapar), isocarboxazid (Marplan), phenelzine (Nardil), and tranylcypromine (Parnate). Select one.    No   Not selected:    Yes   Are you currently taking either of these medications? Select all that apply.    Neither of these   Not selected:    Ciprofloxacin (Cipro)    Fluvoxamine (Luvox)   Are you still taking these medications listed in your medical record? If you're not taking any of these, click Next.  Select all that apply.    NASAL SPRAY SINUS NA    norethindrone 0.35 MG tablet    Wegovy 1 MG/0.5ML solution auto-injector   Are you taking any other medications, vitamins, or supplements? Select one.    No   Not selected:    Yes   Have you ever had an allergic or bad reaction to any medication? Select one.    No   Not selected:    Yes   Do you need a doctor's note? A doctor's note confirms that you received care today and states when you can return to school or work. It does not contain information about your diagnosis or treatment plan. Your provider will make the final decision on whether to give you a doctor's note. Doctor's notes CANNOT be backdated. Select one.    No   Not selected:    Today only (1 day)    Today and tomorrow (2 days)    3 days   Is there anything you'd like to add about your symptoms? Please limit your comments to the symptoms asked about in this interview. If you include comments about other concerns, your provider may recommend that you be seen in person.    The pain in in my lumbar area, it's making it hard to do anything that involves my pelvis (which is most things) I can only lift my right leg a few inches. I don't have sciatica yet but the way my pelvis a low hurt, it's coming.   ----------   Medical history   Medical history data does not currently exist for this patient.

## 2023-12-05 NOTE — EXTERNAL PATIENT INSTRUCTIONS
"   Note   I have also sent in a steroid pack that will help with the pain.   Diagnosis   Low back pain   My name is ARIES Youngblood, and I'm a healthcare provider at [facility].   Over 80% of people have low back pain at some point in their lives. Low back pain is rarely serious, and most cases get better on their own, usually within 4 to 6 weeks.   In the Other treatment section below, I've provided some tips to help you feel better.   Medications   Your pharmacy   [facility]. [facility] [address] [phone]     Prescription   Ibuprofen (800mg): Take 1 tablet by mouth every 8 hours as needed for pain for up to 14 days. Take with food to avoid an upset stomach. Do not exceed 3200 mg (4 tablets) in a 24-hour period.   Cyclobenzaprine (10mg): Take 1 tablet by mouth every 8 hours as needed for spasms, cramping, or tightness of low back muscles for up to 5 days. This is a muscle relaxant. It may cause significant drowsiness. Do not take before driving or operating machinery.    I've given you a prescription dose of ibuprofen. If it's more affordable or convenient, you may use the equivalent amount of non-prescription ibuprofen. For example, instead of taking one 800 mg ibuprofen tablet, you may take four 200 mg ibuprofen tablets. Don't take ibuprofen with other non-steroidal anti-inflammatory drugs (NSAIDs), including naproxen or aspirin. Taking these medications together can increase the risk of serious side effects.   About your diagnosis   Low back pain can have many causes. Most of the time, however, people have \"nonspecific low back pain,\" meaning that there isn't a clear disease, abnormality, or injury causing the pain. More serious cases of low back pain involve damaged discs, inflammation or swelling of the spinal joints, misaligned or fractured vertebrae, and, in rare instances, tumors or infections.   Some factors that increase your risk of developing low back pain " include:    Older age    Obesity or weight gain: Extra weight puts more stress on a person's back    Pregnancy: Pelvic changes and the added weight of a baby often cause low back pain    Low fitness level: When you're less physically fit, you may develop back pain because your back and abdominal muscles don't properly support your spine    A job that requires heavy lifting or twisting    A physically inactive job, such as sitting at a desk all day   Most people with back pain do not need X-rays, MRIs, or surgery.   What to expect   If you follow the treatments recommended here, you should feel better within a few days. Your low back pain should go away completely within 6 weeks.   Many people will develop low back pain again within 6 to 12 months of the first event. To prevent low back pain in the future, follow the advice in the Prevention section below.   When to seek care   Call us at [phone]   with any sudden or unexpected symptoms.    Your back pain makes doing simple tasks very difficult or impossible.    Your back pain doesn't improve within 6 weeks.    Numbness or weakness in your legs.    Problems controlling your bladder or bowels.    Unexplained weight loss.   Other treatment   One of the best things you can do for your low back pain is keep moving! Studies show that people recover faster from low back pain when they remain active. Walking, light stretching, and regular day-to-day activities all help relieve muscle spasms and prevent loss of muscle strength. Aerobic exercise like swimming, biking, and walking has also been shown to help decrease pain and improve overall physical functioning.   The following treatments and alternative therapies may also be helpful:    Heat: Heating pads can ease your back pain by helping muscles relax.    Yoga: Yoga involves holding and moving between postures with controlled breathing to build strength and flexibility. You may find that yoga not only helps with  your back pain but also gives you a greater sense of health and well-being.    Pilates: Pilates also involves controlled movements but focuses on the core muscles found in the abdomen and back. Benefits include better body alignment, core strength, breathing, and balance.    Community-based exercise programs: Group classes combining education and physical training may improve your pain and physical functioning.    Acupuncture, biofeedback, massage, and spinal manipulation: These therapies teach proper body alignment and relaxation techniques. They may provide pain relief and a general sense of well-being. If you're interested in a referral to one of these therapies, get in touch with your primary care provider for more information.   Prevention   What can you do to prevent back pain?    Keep moving and stay active. Exercises that strengthen and stretch the core muscles, including the back, are best. Try this exercise program from the [employer] Academy [employer]: http://orthoinfo.aaos.org/topic.cfm?sjxqu=n56247  . It's designed to promote flexibility and strength in the lower back.    Avoid slouching and don't sit or  the same position for too long. Make sure your work surfaces are at a comfortable height.    When bending and lifting, use your legs instead of your back. Bend and lift without twisting. Never lift objects that are too heavy.    Always wear comfortable, low-heeled shoes.    Eat whole, nutrient-dense foods and avoid weight gain. Excess weight around the waistline can strain lower back muscles.    Avoid smoking. Smoking increases the risk of spinal disc degeneration and osteoporosis. Coughing due to heavy smoking may also lead to strained muscles and back pain.   Your provider   Your diagnosis was provided by ARIES Youngblood, a member of your trusted care team at [facility].   If you have any questions, call us at [phone]  .

## 2023-12-14 ENCOUNTER — OFFICE VISIT (OUTPATIENT)
Dept: ENDOCRINOLOGY | Age: 33
End: 2023-12-14
Payer: COMMERCIAL

## 2023-12-14 VITALS
WEIGHT: 293 LBS | BODY MASS INDEX: 47.09 KG/M2 | DIASTOLIC BLOOD PRESSURE: 84 MMHG | SYSTOLIC BLOOD PRESSURE: 124 MMHG | TEMPERATURE: 96.9 F | HEART RATE: 83 BPM | OXYGEN SATURATION: 98 % | HEIGHT: 66 IN

## 2023-12-14 DIAGNOSIS — E04.1 THYROID NODULE: ICD-10-CM

## 2023-12-14 DIAGNOSIS — E66.01 CLASS 3 SEVERE OBESITY DUE TO EXCESS CALORIES WITHOUT SERIOUS COMORBIDITY WITH BODY MASS INDEX (BMI) OF 50.0 TO 59.9 IN ADULT: Primary | ICD-10-CM

## 2023-12-14 DIAGNOSIS — R23.2 HOT FLASHES: ICD-10-CM

## 2023-12-14 DIAGNOSIS — M54.2 NECK PAIN: ICD-10-CM

## 2023-12-14 RX ORDER — IBUPROFEN 800 MG/1
TABLET ORAL
COMMUNITY
Start: 2023-12-05

## 2023-12-14 NOTE — ASSESSMENT & PLAN NOTE
Complains of hot flashes and irregular menstrual cycles  Her mother got her menopause at age 35  Will check LH, FSH and estradiol level  Will check testosterone

## 2023-12-14 NOTE — ASSESSMENT & PLAN NOTE
Complains of right-sided neck pain  Previous ultrasound was stable  Due to her recent symptoms will repeat thyroid ultrasound

## 2023-12-14 NOTE — ASSESSMENT & PLAN NOTE
Complains of right-sided neck pain associated with respiratory symptoms  Explained to the patient that it could be thyroiditis as it got better when she took the Medrol Dosepak and ibuprofen  Will check TSH, free T4 and sed rate  Check TPO and thyroglobulin antibody  Will repeat thyroid ultrasound

## 2023-12-14 NOTE — ASSESSMENT & PLAN NOTE
Patient's (Body mass index is 51.87 kg/m².)   Used to take Ozempic which was switched to Wegovy  Currently on Wegovy 1 mg weekly  After the first dose of Wegovy had a flulike symptoms: Explained to patient that Nasopharyngitis as a side effect of Wegovy is very rare.  Patient was recommended to try it again and if develops flulike symptoms will discontinue Wegovy.   We discussed portion control and increasing exercise.   Check HgA1C, CMP today

## 2023-12-14 NOTE — PROGRESS NOTES
Chief Complaint  Abnormal Weight Gain and Solitary thyroid nodule (Pt states that energy levels have been low, weight is stable, does have family hx of thyroid disease(mother), does have a regular menstrual cycle, last start date was 11/23/2023.)    Subjective        Belem Mcknight presents to [facility] for follow up.      History of Present Illness      Morbid obesity     Has had ongoing weight issues since she was a kid.   Due to family history of Cushing disease (in her cousin) was evaluated for Cushing in the past which was unremarkable.  Patient reports morbid obesity in her family  Had a high testosterone level in the past (testosterone was checked during her pregnancy)  Used to take Ozempic weekly which was switched to wegovy as it was not covered by her insurance  The current dose of Wegovy is 1 mg weekly, after the first dose she had flulike symptoms -not clear if it was a viral infection or side effect of wegovy  (Patient states that her kids go to , and whenever they get sick she gets sick too)        Family history of premature ovarian insufficiency;  Her mom got her menopause at age 35  Patient menarche at age 7  Had a normal menstrual cycles in the past however in the last 2 months her periods are irregular, heavy and painful  She also complains of sensitive vaginal skin which is associated with burning sensation, pain and severe dyspareunia: The symptoms usually happen during her period.   She has a maternal male cousin with learning disability.         Thyroid nodule    She states that 1 week after Thanksgiving she had a right neck pain associated with respiratory symptoms.  She had a recurrence of the pain in the same side of the neck recently which got better with ibuprofen and Medrol Dosepak that she was taking for back pain.     Complains of right sided neck pain      Right thyroid nodule 1.26 x 1.47 x 0.7cm   Pre-Operative Diagnosis:  Right thyroid nodule  "    SPECIMEN ADEQUACY:   Satisfactory for evaluation     CYTOLOGIC DIAGNOSIS:   A) Right thyroid nodule, fine needle aspiration (10 smears only):   - Benign ([place] category II).   - Benign appearing follicular cells in a background of blood and colloid, consistent with a    benign follicular nodule.     7/2023:  FINDINGS: The right thyroid lobe measures 4.8 x 1.8 x 1.6 cm, and the  left thyroid lobe measures 4.1 x 1.5 x 1.1 cm, with 4 mm isthmus  thickness. A solid appearing isoechoic well marginated wider than tall  right thyroid nodule without microcalcification measures 1.1 x 1 x 0.6  cm, stable.     IMPRESSION:  Right thyroid nodule, tirads-3. No further follow-up is  necessary.          Objective   Vital Signs:  /84   Pulse 83   Temp 96.9 °F (36.1 °C) (Temporal)   Ht 167.6 cm (65.98\")   Wt (!) 146 kg (321 lb 3.2 oz)   SpO2 98%   BMI 51.87 kg/m²   Estimated body mass index is 51.87 kg/m² as calculated from the following:    Height as of this encounter: 167.6 cm (65.98\").    Weight as of this encounter: 146 kg (321 lb 3.2 oz).               Review of Systems   Eyes:  Negative for visual disturbance.   Gastrointestinal:  Negative for abdominal pain, nausea and vomiting.   Endocrine: Negative for cold intolerance and heat intolerance.   Psychiatric/Behavioral:  Negative for hallucinations.         Physical Exam  Constitutional:       Appearance: She is obese.   HENT:      Head: Normocephalic and atraumatic.   Eyes:      Extraocular Movements: Extraocular movements intact.   Neck:      Comments: Neck is swollen with right-sided tenderness  Cardiovascular:      Rate and Rhythm: Normal rate and regular rhythm.   Pulmonary:      Effort: Pulmonary effort is normal.      Breath sounds: Normal breath sounds. No wheezing.   Abdominal:      Palpations: Abdomen is soft.      Tenderness: There is no abdominal tenderness.   Musculoskeletal:         General: No swelling. Normal range of motion.      Cervical " back: Neck supple. Tenderness present.   Neurological:      Mental Status: She is alert and oriented to person, place, and time.   Psychiatric:         Mood and Affect: Mood normal.        Result Review :  The following data was reviewed by: Mahrokh Nokhbehzaeim, MD on 12/14/2023:  CMP          12/21/2022    16:19   CMP   Glucose 95    BUN 5    Creatinine 0.51    Sodium 139    Potassium 3.9    Chloride 105    Calcium 9.2    BUN/Creatinine Ratio 9.8        TSH          6/7/2023    16:09   TSH   TSH 1.600      Data reviewed : Radiologic studies thyroid ultrasound.             Assessment and Plan   Diagnoses and all orders for this visit:    1. Class 3 severe obesity due to excess calories without serious comorbidity with body mass index (BMI) of 50.0 to 59.9 in adult (Primary)  Assessment & Plan:  Patient's (Body mass index is 51.87 kg/m².)   Used to take Ozempic which was switched to Wegovy  Currently on Wegovy 1 mg weekly  After the first dose of Wegovy had a flulike symptoms: Explained to patient that Nasopharyngitis as a side effect of Wegovy is very rare.  Patient was recommended to try it again and if develops flulike symptoms will discontinue Wegovy.   We discussed portion control and increasing exercise.   Check HgA1C, CMP today     Orders:  -     Cancel: Hemoglobin A1c  -     Cancel: T4, Free  -     Cancel: Sedimentation Rate  -     Cancel: Comprehensive Metabolic Panel  -     Comprehensive Metabolic Panel  -     Hemoglobin A1c  -     Sedimentation Rate  -     T4, Free    2. Neck pain  Assessment & Plan:  Complains of right-sided neck pain associated with respiratory symptoms  Explained to the patient that it could be thyroiditis as it got better when she took the Medrol Dosepak and ibuprofen  Will check TSH, free T4 and sed rate  Check TPO and thyroglobulin antibody  Will repeat thyroid ultrasound        Orders:  -     Cancel: TSH  -     Cancel: T4, Free  -     Cancel: Sedimentation Rate  -     Sedimentation  Rate  -     T4, Free  -     TSH    3. Thyroid nodule  Assessment & Plan:  Complains of right-sided neck pain  Previous ultrasound was stable  Due to her recent symptoms will repeat thyroid ultrasound     Orders:  -     Cancel: TSH  -     Cancel: T4, Free  -     US Thyroid  -     Cancel: Thyroid Peroxidase Antibody  -     Cancel: Thyroglobulin Antibody  -     T4, Free  -     Thyroglobulin Antibody  -     Thyroid Peroxidase Antibody  -     TSH    4. Hot flashes  Assessment & Plan:  Complains of hot flashes and irregular menstrual cycles  Her mother got her menopause at age 35  Will check LH, FSH and estradiol level  Will check testosterone    Orders:  -     Cancel: Luteinizing Hormone; Future  -     Cancel: Follicle Stimulating Hormone; Future  -     Cancel: Estradiol; Future  -     Cancel: Testosterone; Future  -     Estradiol; Future  -     Follicle Stimulating Hormone; Future  -     Luteinizing Hormone; Future  -     Testosterone; Future           I spent 40 minutes caring for Belem on this date of service. This time includes time spent by me in the following activities:preparing for the visit, reviewing tests, obtaining and/or reviewing a separately obtained history, performing a medically appropriate examination and/or evaluation , counseling and educating the patient/family/caregiver, ordering medications, tests, or procedures, and documenting information in the medical record  Follow Up   Return in about 3 months (around 3/14/2024).  Patient was given instructions and counseling regarding her condition or for health maintenance advice. Please see specific information pulled into the AVS if appropriate.        <<----- Click to add NO pertinent Family History

## 2023-12-15 LAB
ALBUMIN SERPL-MCNC: 4.6 G/DL (ref 3.5–5.2)
ALBUMIN/GLOB SERPL: 2 G/DL
ALP SERPL-CCNC: 119 U/L (ref 39–117)
ALT SERPL-CCNC: 24 U/L (ref 1–33)
AST SERPL-CCNC: 15 U/L (ref 1–32)
BILIRUB SERPL-MCNC: 0.4 MG/DL (ref 0–1.2)
BUN SERPL-MCNC: 9 MG/DL (ref 6–20)
BUN/CREAT SERPL: 14.5 (ref 7–25)
CALCIUM SERPL-MCNC: 9.6 MG/DL (ref 8.6–10.5)
CHLORIDE SERPL-SCNC: 102 MMOL/L (ref 98–107)
CO2 SERPL-SCNC: 26.7 MMOL/L (ref 22–29)
CREAT SERPL-MCNC: 0.62 MG/DL (ref 0.57–1)
EGFRCR SERPLBLD CKD-EPI 2021: 119.3 ML/MIN/1.73
ERYTHROCYTE [SEDIMENTATION RATE] IN BLOOD BY WESTERGREN METHOD: 17 MM/HR (ref 0–20)
GLOBULIN SER CALC-MCNC: 2.3 GM/DL
GLUCOSE SERPL-MCNC: 88 MG/DL (ref 65–99)
HBA1C MFR BLD: 5.5 % (ref 4.8–5.6)
POTASSIUM SERPL-SCNC: 4.2 MMOL/L (ref 3.5–5.2)
PROT SERPL-MCNC: 6.9 G/DL (ref 6–8.5)
SODIUM SERPL-SCNC: 140 MMOL/L (ref 136–145)
T4 FREE SERPL-MCNC: 1.3 NG/DL (ref 0.93–1.7)
THYROGLOB AB SERPL-ACNC: <1 IU/ML (ref 0–0.9)
THYROPEROXIDASE AB SERPL-ACNC: 17 IU/ML (ref 0–34)
TSH SERPL DL<=0.005 MIU/L-ACNC: 0.63 UIU/ML (ref 0.27–4.2)

## 2023-12-19 ENCOUNTER — E-VISIT (OUTPATIENT)
Dept: FAMILY MEDICINE CLINIC | Facility: TELEHEALTH | Age: 33
End: 2023-12-19
Payer: COMMERCIAL

## 2023-12-19 RX ORDER — DEXTROMETHORPHAN HYDROBROMIDE AND PROMETHAZINE HYDROCHLORIDE 15; 6.25 MG/5ML; MG/5ML
5 SYRUP ORAL 4 TIMES DAILY PRN
Qty: 118 ML | Refills: 0 | Status: SHIPPED | OUTPATIENT
Start: 2023-12-19

## 2023-12-19 RX ORDER — AZITHROMYCIN 250 MG/1
TABLET, FILM COATED ORAL
Qty: 6 TABLET | Refills: 0 | Status: SHIPPED | OUTPATIENT
Start: 2023-12-19

## 2023-12-19 NOTE — EXTERNAL PATIENT INSTRUCTIONS
"   Note   I have also prescribed you promethazine DM cough syrup and Azithromycin antibiotic.   Diagnosis   Post viral cough syndrome   My name is ARIES Dupree, and I'm a healthcare provider at [facility]. I reviewed your interview, and I see that you have post viral cough syndrome. This is a cough that continues longer than 3 weeks after you've had a viral respiratory infection, such as the common cold.   Medications   Your pharmacy   [facility] - [address], [address] [place] [address] [phone]     Prescription   Benzonatate (200mg): Take 1 capsule by mouth 3 times a day as needed for cough. Do not chew or cut these capsules.   Albuterol sulfate HFA (90mcg/puff): Inhale 2 puffs every 6 hours as needed for wheezing. If symptoms are severe, may use as often as every 4 hours.   I haven't prescribed antibiotics. Antibiotics fight bacterial infections and won't help a post viral cough. Antibiotics could even make you feel worse as they can cause or worsen nausea, diarrhea, and stomach pain.   About your diagnosis    Post viral cough syndrome is usually caused by a viral respiratory infection like a cold or the flu. It's called \"post viral\" because the cough continues after you've recovered from the viral infection.    The exact cause of post viral cough isn't clear, but it may be related to changes that happened during the viral infection, including:   - Inflammation damaging the lining of the airways   - The coughing reflex becoming more sensitive   - More mucus being produced    Post viral cough can be either dry or productive (meaning you cough up mucus).    A cough that lasts a long time can cause other symptoms, like a sore or irritated throat, a hoarse voice, or frequent throat clearing.   What to expect    Your cough should go away on its own within 2 months. However, after COVID-19 infection, the cough can last longer.    The medications and home treatment " recommended here will help ease your symptoms while you recover.   When to seek care   Call us at [phone]   with any sudden or unexpected symptoms.    Your cough lasts longer than 8 weeks (2 months).    Severe chest pain. If you develop any of the following, call 911 or go immediately to your nearest emergency room:    A feeling of crushing pressure on your chest    Chest pain that gets worse with physical activity    Chest pain that radiates to the shoulder, neck, arm, or jaw   Other treatment    Breathing humid air may help with nasal congestion and cough. Using a clean humidifier or a cool-mist vaporizer in your room at night can help. So can breathing in the steam from a hot shower.    Drink water and warm liquids to stay hydrated. A teaspoon of honey stirred into warm lemon water or weak tea can help soothe a sore throat and cough.    Avoid smoke and air pollution.   Prevention   The best way to avoid getting a lingering cough is to prevent getting a respiratory illness.    Wash hands often with soap and water    Avoid close contact with people who are coughing or sneezing    Avoid touching your eyes, nose, and mouth    Regularly disinfect shared areas at home and at work    Stay up to date with vaccinations for flu, pertussis, pneumonia, and COVID-19    If you do get a respiratory illness, make sure to rest, and use the tips included here to treat your symptoms.    Coronavirus (COVID-19) information   Common symptoms of COVID-19 include fever, cough, shortness of breath, fatigue, muscle or body aches, headaches, new loss of sense of taste or smell, sore throat, stuffy or runny nose, nausea or vomiting, and diarrhea. Most people who get COVID-19 have mild symptoms and can rest at home until they get better. Elderly people and those with chronic medical problems may be at risk for more serious complications.   FAQs about the COVID-19 vaccine   Are the vaccines safe?   Yes. Hundreds of millions of people  in the [place] have already safely received COVID-19 vaccines under the most intense safety monitoring in the history of the [place].   Do I need the vaccine if I've already had COVID?   Yes. Vaccination helps protect you even if you've already had COVID.   If you had COVID-19 and had symptoms, wait to get vaccinated until you've recovered and completed your isolation period.   If you tested positive for COVID-19 but did not have symptoms, you can get vaccinated after 5 full days have passed since you had a positive test, as long as you don't develop symptoms.   How many doses of the vaccine do I need?   Visit www.cdc.gov/coronavirus/2019-ncov/vaccines/stay-up-to-date.html   to find out how to stay up to date with your COVID-19 vaccines.   I'm immunocompromised. How many doses of the vaccine do I need?   For information on how immunocompromised people can stay up to date with their COVID-19 vaccines, visit www.cdc.gov/coronavirus/2019-ncov/vaccines/recommendations/immuno.html  .   What are the common side effects of the vaccine?   A sore arm, tiredness, headache, and muscle pain may occur within two days of getting the vaccine and last a day or two. For the Moderna or Pfizer vaccines, side effects are more common after the second dose. People over the age of 55 are less likely to have side effects than younger people.   After I'm up to date on vaccines, can I still get or spread COVID?   Yes, you can still get COVID, but your disease should be milder. And your risk of serious illness, hospitalization, and complications will be much lower, especially if you're up to date. Unfortunately, you can still spread COVID if you've been vaccinated. That's why it's important to follow isolation guidelines if you get sick or test positive.   After I'm up to date on vaccines, can I go back to normal?   You should still wear a mask indoors in public if:    It's required by laws, rules, regulations, or local guidance.    You have a weakened  immune system.    Your age puts you at increased risk of severe disease.    You have a medical condition that puts you at increased risk of severe disease.    Someone in your household has a weakened immune system, is at increased risk for severe disease, or is unvaccinated.    You're in an area of high transmission.   Where can I get a COVID-19 vaccine?   Visit [facility]'s website for more information. To find a COVID-19 vaccination site near you, visit www.[employer].gov/  , call [phone]  , or text your zip code to 455177 ([place]). Message and data rates may apply.   I've had close contact with someone who has COVID. Do I need to quarantine, and if so, for how long?   For the most current answer, including a calculator to determine whether you need to stay home and for how long, visit www.cdc.gov/coronavirus/2019-ncov/your-health/isolation.html  .   I've tested positive for COVID. How long do I need to isolate?   For the latest recommendations, including a calculator to determine how long you need to stay home, visit www.cdc.gov/coronavirus/2019-ncov/your-health/isolation.html  .   What if I develop symptoms that might be from COVID?   For the latest recommendations on what to do if you're sick, including when to seek emergency care, visit www.cdc.gov/coronavirus/2019-ncov/if-you-are-sick/index.html  .    Flu vaccine information   Who should get a flu vaccine?   Everyone 6 months of age and older should get a yearly flu vaccine.   When should I get vaccinated?   You should get a flu vaccine by the end of October. Once you're vaccinated, it takes about two weeks for antibodies to develop and protect you against the flu. That's why it's important to get vaccinated as soon as possible.   After October, is it too late to get vaccinated?   No. You should still get vaccinated. As long as the flu viruses are still in your community, flu vaccines will remain available, even into January of next year or later.    Why do I need a flu vaccine EVERY year?   Flu viruses are constantly changing, so flu vaccines are usually updated from one season to the next. Your protection from the flu vaccine also lessens over time.   Is the flu vaccine safe?   Yes. Over the last 50 years, hundreds of millions of Americans have safely received the flu vaccines.   What are the side effects of flu vaccines?   You CANNOT get the flu from a flu vaccine. Common side effects of the flu shot include soreness, redness and/or swelling where the shot was given, low grade fever, and aches. Common side effects of the nasal spray flu vaccine for adults include runny nose, headaches, sore throat, and cough. For children, side effects include wheezing, vomiting, muscle aches, and fever.   Does the flu vaccine increase your risk of getting COVID-19?   No. There is no evidence that getting a flu vaccine increases your risk of getting COVID-19.   Is it safe to get the flu vaccine along with a COVID-19 vaccine?   Yes. It's safe to get the flu vaccine with a COVID-19 vaccine or booster.   Contact your healthcare provider TODAY for details on when and where to get your flu vaccine.   Your provider   Your diagnosis was provided by ARIES Dupree, a member of your trusted care team at [facility].   If you have any questions, call us at [phone]  .

## 2023-12-19 NOTE — E-VISIT TREATED
Chief Complaint: Cold, flu, COVID, sinus, hay fever, or seasonal allergies   Patient introduction   Patient is 33-year-old female with cough, voice hoarseness, myalgia, and fatigue that started 3 to 4 weeks ago.   Systemic symptoms have been constant.   COVID-19 testing history, vaccination status, and exposure:    Patient was tested for COVID-19 7 to 14 days ago. Test result was negative.    Patient was prompted to take a self-test during the interview, but does not have a COVID-19 test kit.    Has not received an updated [id number] COVID-19 vaccination (Pfizer-BioNTech or Moderna vaccine after September 12, 2023; or Novavax vaccine after October 3, 2023).    No known exposure to a person with a confirmed or suspected case of COVID-19.    No high-risk (household) exposure to COVID-19 within the last 14 days.   Risk factors for severe disease from COVID-19 infection    BMI >= 40.    Mostly sedentary lifestyle.   Warning. The following may warrant further investigation:    Chest pain that radiates to shoulders, neck, arms, or jaw    Constant myalgia lasting longer than 2 weeks.    Constant fatigue lasting longer than 2 weeks.   General presentation   No improvement of symptoms. Symptoms came on suddenly.   Fever:    No fever.   Sinus and nasal symptoms:    1 to 3 episodes of antibiotic treatment for sinus infection in the last year.    Sinus pain or pressure on or around the forehead and eyes.    Patient first noticed sinus pain less than 5 days ago.    Sinus pain is worse with Valsalva.    No nasal or sinus congestion.    No nasal discharge.    No itchy nose or sneezing.    No history of unhealed nasal septal ulcer/nasal wound.    No history of deviated septum or nasal polyps.   Throat symptoms:    Voice is mildly hoarse. Patient does not believe hoarseness is due to voice strain.    No sore throat.   Head and body aches:    Myalgia.    Fatigue.    No headache.    No sweats.    No chills.   Cough:    Cough is  worse in the morning and at night/while sleeping.    Cough is mildly productive of sputum.    Describes color of sputum as green.   Wheezing and shortness of breath:    Wheezing.    Has previously used an albuterol inhaler for URI, bronchitis, or pneumonia.    Has previously used a steroid inhaler for URI, bronchitis, or pneumonia.    Using albuterol for current symptoms.    Using albuterol every 6 hours or longer.    Patient requests a prescription of albuterol in the form of an inhaler.    No COPD diagnosis.    No asthma diagnosis.    No shortness of breath.   Chest pain:    Has chest pain.    Describes chest pain as spreading into shoulders, neck, arms, or jaw.    Pain is reproducible with palpation.    Chest pain is not exacerbated by emotional stress, exertion/exercise, or respiration.    No history of angina, coronary artery disease, peripheral artery disease, myocardial infarction, cerebrovascular disease, or transient ischemic attack.    Chest pain is not the patient's chief complaint.    [place] Heart Score (MHS): 0, low risk of CAD. Assigning 1 point to each of 5 criteria (female >= 65 years old or male >= 55 years, known CAD, pain worse with exercise, pain not reproducible with palpation, and patient assumes pain is coming from their heart), the MHS is a validated clinical decision rule used to rule out coronary artery disease in primary care patients with chest pain.   Ear symptoms:    Current symptoms include pressure and fullness in the ear(s).   Dizziness:    No dizziness.   Allergies:    No history of allergies.   Flu exposure:    No recent known exposure to a person with a confirmed flu diagnosis.    Has not had a flu vaccine this season.   Patient is taking over-the-counter medications for current symptoms, including acetaminophen, fluticasone, and ibuprofen.   Review of red flags/alarm symptoms:    No changes in alertness or awareness.    No paroxysmal cough followed by whoop on inspiration    No  "symptoms suggesting respiratory distress.    No decreased urination.   Risk factors for antibiotic resistance:    Close contact with a child in .   Pregnancy/menstrual status/breastfeeding:    Not pregnant.    Not breastfeeding.    Regarding date of last menstrual period, patient writes: 11/23-11/30.   Self-exam:    Height: 5' 6\"    Weight: 318 lbs    Neck lymph nodes feel normal.   Recent antibiotic use:    Has not taken antibiotics for similar symptoms within the past month.   Current medications   Currently taking norethindrone 0.35 MG tablet and Wegovy 1 MG/0.5ML solution auto-injector.   Medication allergies   None.   Medication contraindication review   No history of anaphylactic reaction to beta-lactam antibiotics; aspirin triad; blood dyscrasia; bone marrow depression; catecholamine-releasing paraganglioma; coronary artery disease; coagulation disorder; congenital long QT syndrome; depression; electrolyte abnormalities; fungal infection; GI bleeding; GI obstruction; G6PD deficiency; heart arrhythmia; hypertension; mononucleosis; myasthenia; recent myocardial infarction; NSAID-induced asthma/urticaria; Parkinson's disease; pheochromocytoma; porphyria; Reye syndrome; seizure disorder; ulcerative colitis; and urinary retention.   No history of metoclopramide-associated dystonic reaction and tardive dyskinesia.   No known history of amoxicillin-clavulanate-associated cholestatic jaundice or hepatic impairment.   No known history of azithromycin-associated cholestatic jaundice or hepatic impairment.   Past medical history   Immune conditions:   No immunocompromising conditions.   No history of cancer.   Social history   Never smoked tobacco.   High-risk household contacts:    Household contact under the age of 5.   Patient-submitted comments   Patient was asked if they had anything to add about their symptoms. Patient writes: The chest pain is soreness from coughing deeply and often. My entire torso hurts " and my low bell is sore like I've pulled something. My kids had rsv a few weeks ago and I had mild symptoms right after but the cough seems to be getting worse. .   Patient did not request an excuse note.   Assessment   Post viral cough syndrome.   This is the likely diagnosis based on patient's interview responses, including:    Symptom profile   Plan   Medications:    benzonatate 200 mg capsule RX 200mg 1 cap PO tid PRN 7d for cough. Do not chew or cut these capsules. Amount is 21 cap.    albuterol sulfate HFA 90 mcg/actuation aerosol inhaler RX 90mcg/puff 2 puffs inhaled q6h PRN 10d for wheezing. If symptoms are severe, may use as often as every 4 hours. Amount is 1 each.   The patient's prescriptions will be sent to:   [facility] [address], [address] [place] [address]   Phone: [phone]     Fax: [phone]   Education:    Condition and causes    Prevention    Treatment and self-care    When to call provider   ----------   Electronically signed by ARIES Dupree on 2023-12-19 at 16:19PM   ----------   Patient Interview Transcript:   Which of these symptoms are bothering you? Select all that apply.    Cough    Hoarse voice or loss of voice    Muscle or body aches    Fatigue or tiredness   Not selected:    Shortness of breath    Stuffed-up nose or sinuses    Runny nose    Itchy or watery eyes    Itchy nose or sneezing    Loss of smell or taste    Sore throat    Headache    Fever    Sweats    Chills    Nausea or vomiting    Diarrhea    I don't have any of these symptoms   When did your current symptoms start? Select one.    3 to 4 weeks ago   Not selected:    Less than 48 hours ago    3 to 5 days ago    6 to 9 days ago    10 to 14 days ago    2 to 3 weeks ago    More than a month ago   Did your symptoms come on suddenly or gradually? Select one.    Suddenly   Not selected:    Gradually    I'm not sure   Have your symptoms improved at all since they began?  "Select one.    No   Not selected:    Yes, but they haven't gone away completely    Yes, but then they came back worse than before   Since your current symptoms started, have you been tested for COVID-19? This includes home self-tests as well as nose swab or saliva tests done at a doctor's office, lab, or testing site. Select one.    Yes   Not selected:    No   When was your most recent COVID-19 test? Select one.    7 to 14 days ago   Not selected:    Within the last 24 hours    Within the last week (specify date as MM/DD/YY)    15 to 30 days ago    More than 1 month ago   What was the result of your most recent COVID-19 test? Select one.    Negative   Not selected:    Positive   Even though your last test was negative, you could still have COVID. You may have tested before the virus was detectable. In some cases, repeat testing over several days is needed. - If you have a COVID test kit, take the test now before continuing this interview. - If you choose not to take a test or don't have one, you should still continue this interview. Your provider can still help you get care. Do you have a COVID test kit? Select one.    No, I don't have a test kit   Not selected:    Yes, and I'll take another test now    Yes, but I prefer not to take a test now   Has anyone in your household tested positive for COVID-19 in the past 14 days? Select one.    No   Not selected:    Yes   In the last 14 days, have you had close contact with someone who has COVID-19? \"Close contact\" means any of these: - Caring for someone with COVID-19. - Being within 6 feet of someone with COVID-19 for a total of at least 15 minutes over a 24-hour period. For example, three 5-minute exposures for a total of 15 minutes. - Being in direct contact with respiratory droplets from someone with COVID-19 (being coughed on, kissing, sharing utensils). Select one.    No, not that I know of   Not selected:    Yes, a confirmed case    Yes, a suspected case   Have you " gotten the [id number] updated COVID-19 vaccine? This means either the updated Pfizer-BioNTech or Moderna vaccine after September 12, 2023; or the updated Novavax vaccine after October 3, 2023. Select one.    No   Not selected:    Yes   Since your symptoms started, have you felt dizzy? Select one.    No   Not selected:    Yes, but I can still do my regular daily activities    Yes, and it makes it hard to stand, walk, or do daily activities   You mentioned having sweats, chills, aches, fatigue, or headache. Have you had these symptoms the whole time you've been sick? Select one.    Yes   Not selected:    No, they come and go    No, I had them at first, but they're gone now   Do you cough so hard that it's made you gag or vomit? By gag, we mean has your coughing made you choke or dry heave? Select all that apply.    Yes, my coughing has made me gag    Yes, my coughing has made me vomit   Not selected:    No   Does your cough come in spasms of 10 to 20 coughs in a row, followed by a loud whoop when breathing in? Select one.    No   Not selected:    Yes   When is your cough the worst? Select all that apply.    In the morning, or when I wake up    At nighttime, or while I'm sleeping   Not selected:    During the day    I haven't noticed a difference depending on time of day   Are you coughing up mucus or phlegm? Select one.    Yes, a little   Not selected:    No, my cough is dry    Yes, a lot   What color is most of the mucus or phlegm that you're coughing up? Select one.    Green or greenish   Not selected:    Clear    White/frothy    Yellow or yellowish    Red or pink    I'm not sure   Do you feel sinus pain or pressure in any of these areas?    In my forehead    Around my eyes   Not selected:    Behind my nose    In my cheeks    In my upper teeth or jaw    No   When did you first notice your sinus pain or pressure? Select one.    Less than 5 days ago   Not selected:    5 to 9 days ago    10 to 14 days ago    2 to 4  "weeks ago    1 month ago or longer   Does coughing, sneezing, or leaning forward make your sinuses feel worse? Select one.    Yes   Not selected:    No   Do you have chest pain? You might also feel it as discomfort, aching, tightness, or squeezing in the chest. Select one.    Yes   Not selected:    No   Which of these is true of your chest pain? Select one.    My chest hurts even when I'm not coughing   Not selected:    My chest hurts only when I cough   Which of these are true of your chest pain?    It feels like it's spreading into my shoulders, neck, arms, or jaw   Not selected:    It feels like crushing pressure on my chest    It feels like a sharp, stabbing pain    It feels like it's coming from my heart    It makes me sweat a lot more than usual    None of the above   Do any of these make your chest pain worse? Select all that apply.    None of the above   Not selected:    Emotional stress    Exertion or exercise    Taking a deep breath   Gently press on your chest with the palm of your hand. Does this make your chest pain feel worse? Select one.    Yes   Not selected:    No   Have you ever had any of these conditions? Select all that apply.    None of the above   Not selected:    Angina    Blocked arteries in the heart    Blocked arteries in the legs    Heart attack    Stroke or TIA (\"mini-stroke\")   Have you urinated at least 3 times in the last 24 hours? Select one.    Yes   Not selected:    No   Changes in alertness or awareness may mean you need emergency care. Since your symptoms started, have you had any of these? Select all that apply.    None of the above   Not selected:    Confusion    Slurred speech    Not knowing where you are or what day it is    Difficulty staying conscious    Fainting or passing out   Do your symptoms include a whistling sound, or wheezing, when you breathe? Select one.    Yes   Not selected:    No   Early in this interview, you told us you were hoarse or you'd lost your voice. " How would you describe the changes to your voice? Select one.    It just sounds a little raspy   Not selected:    It's harder than usual to talk    I can barely talk at all   Is it possible that you strained your voice? Singing, yelling, or talking more or louder than usual can cause voice strain. Select one.    No, not that I know of   Not selected:    Yes   Do you have any of these symptoms in your ear(s)? Select all that apply.    Pressure    Fullness   Not selected:    Pain    Crackling or popping    Plugged or blocked sensation    None of the above   Are your glands/lymph nodes swollen, or does it hurt when you touch them?    No, not that I can tell   Not selected:    Yes   In the past week, has anyone around you (such as at school, work, or home) had a confirmed diagnosis of the flu? A confirmed diagnosis means that a nose swab was done to verify a flu infection. Select all that apply.    No, not that I know of   Not selected:    I live with someone who has the flu    I've been within touching distance of someone who has the flu    I've walked by, or sat about 3 feet away from, someone who has the flu    I've been in the same building as someone who has the flu   Have you ever been diagnosed with asthma? Select one.    No   Not selected:    Yes   Have you ever been prescribed albuterol to use for wheezing, cough, or shortness of breath caused by a cold, bronchitis, or pneumonia? Albuterol (ProAir, Proventil, Ventolin) is prescribed as an inhaler or a solution to be used with a nebulizer machine. Select one.    Yes   Not selected:    No, not that I know of   Have you ever been prescribed a steroid inhaler to use for wheezing, cough, or shortness of breath caused by a cold, bronchitis, or pneumonia? Some examples of steroid inhalers include Pulmicort, Flovent, Qvar, and Alvesco. Select one.    Yes   Not selected:    No, not that I know of   Have you used albuterol for your current symptoms? This includes both  albuterol inhalers and albuterol solution in a nebulizer machine. Select one.    Yes   Not selected:    No, I don't have any, or it's     No, I don't feel like I need it   How often are you using albuterol? If you're using albuterol very frequently, you'll need to be seen in person. Select one.    Every 6 hours or longer   Not selected:    More than once an hour    Every 1 to 2 hours    Every 2 to 3 hours    Every 3 to 4 hours    Every 4 to 6 hours   Do you need a refill of albuterol? Select one.    Yes   Not selected:    No   What form of albuterol do you prefer? Select one.    Inhaler   Not selected:    Nebulizer solution   Have you ever been diagnosed with chronic obstructive pulmonary disease (COPD)? Select one.    No, not that I know of   Not selected:    Yes   In the past month, have you taken antibiotics for similar symptoms? Examples of antibiotics include amoxicillin, amoxicillin-clavulanate (Augmentin), penicillin, cefdinir (Omnicef), doxycycline, and clindamycin (Cleocin). Select one.    No   Not selected:    Yes (specify)   In the last year, how many times were you treated with antibiotics for a sinus infection? Select one.    1 to 3 times   Not selected:    None    4 or more times   Have you been diagnosed with a deviated septum or nasal polyps? The nose is divided into two nostrils by the septum. A crooked septum is called a deviated septum. Nasal polyps are growths inside the nose or sinuses. Select one.    No, not that I know of   Not selected:    Yes, but I had surgery to treat them    Yes, I have a deviated septum    Yes, I have nasal polyps    Yes, I have a deviated septum and nasal polyps   Do you have a sore inside your nose that won't heal? Select one.    No, not that I know of   Not selected:    Yes   Do you have allergies (pollen, dust mites, mold, animal dander)? Select one.    No, not that I know of   Not selected:    Yes   Have you had a flu shot this season? Select one.    No   Not  selected:    Yes, less than 2 weeks ago    Yes, 2 to 4 weeks ago    Yes, 1 to 3 months ago    Yes, 3 to 6 months ago    Yes, more than 6 months ago   Are you pregnant? Select one.    No   Not selected:    Yes   When was your last menstrual period? If you don't currently have periods or no longer have periods, please briefly explain.    -   Within the last 2 weeks, have you: - Given birth - Had a miscarriage - Had a pregnancy loss - Had an  Being postpartum (live birth or loss) within the last 2 weeks increases your risk of flu complications. Select one.    No   Not selected:    Yes   Are you breastfeeding? Select one.    No   Not selected:    Yes   The flu and COVID-19 can be more serious for people in certain groups. The next few questions help us figure out if you or anyone you live with is at higher risk for complications from these infections. Do any of these statements apply to you? Select all that apply.    None of the above   Not selected:    I'm     I'm     I'm Black    I'm  or    Do you smoke tobacco? Select one.    No   Not selected:    Yes, every day    Yes, some days    No, I quit   Do you have a mostly inactive lifestyle? Answer yes if all of these are true: - You spend at least 6 hours a day sitting or lying down - You get less than 2 and a half hours per week of moderate exercise such as walking fast - You get less than 1 hour and 15 minutes per week of intense exercise such as jogging or running Select one.    Yes   Not selected:    No   Do you have any of these conditions? Select all that apply.    None of the above   Not selected:    Chronic lung disease, such as cystic fibrosis or interstitial fibrosis    Heart disease, such as congenital heart disease, congestive heart failure, or coronary artery disease    Disorder of the brain, spinal cord, or nerves and muscles, such as dementia, cerebral palsy, epilepsy, muscular dystrophy, or  developmental delay    Metabolic disorder or mitochondrial disease    Cerebrovascular disease, such as stroke or another condition affecting the blood vessels or blood supply to the brain    Down syndrome    Mood disorder, including depression or schizophrenia spectrum disorders    Substance use disorder, such as alcohol, opioid, or cocaine use disorder    Tuberculosis    Primary immunodeficiency   Do you live in a group care setting? Examples include: - Nursing home - Residential care - Psychiatric treatment facility - [facility] - Homeless shelter - Foster care setting Select one.    No   Not selected:    Yes   Are you a healthcare worker? Select one.    No   Not selected:    Yes   People with a very high body mass index (BMI) are at higher risk for developing complications from the flu and severe illness from COVID-19. To determine your BMI, we need to know your weight and height. Please enter your weight (in pounds).    Weight   Please enter your height.    Height   Do you have any of these conditions that can affect the immune system? Scroll to see all options. Select all that apply.    None of these   Not selected:    History of bone marrow transplant    Chronic kidney disease    Chronic liver disease (including cirrhosis)    HIV/AIDS    Inflammatory bowel disease (Crohn's disease or ulcerative colitis)    Lupus    Moderate to severe plaque psoriasis    Multiple sclerosis    Rheumatoid arthritis    Sickle cell anemia    Alpha or beta thalassemia    History of solid organ transplant (kidney, liver, or heart)    History of spleen removal    An autoimmune disorder not listed here (specify)    A condition requiring treatment with long-term use of oral steroids (such as prednisone, prednisolone, or dexamethasone) (specify)   Have you ever been diagnosed with cancer? Select one.    No   Not selected:    Yes, I have cancer now    Yes, but I'm in remission   Do any of these apply to  you? Select all that apply.    I'm in close contact with a child in    Not selected:    I've been hospitalized within the last 5 days    I have diabetes    None of the above   The flu and COVID-19 can be more serious for people in certain groups. Do any of these apply to the people who live with you? Select all that apply.    Under age 5   Not selected:    Over age 65            Black     or     Pregnant    Has given birth, had a miscarriage, had a pregnancy loss, or had an  in the last 2 weeks    None of the above   Does any member of your household have any of these medical conditions? Select all that apply.    None of the above   Not selected:    Asthma    Disorders of the brain, spinal cord, or nerves and muscles, such as dementia, cerebral palsy, epilepsy, muscular dystrophy, or developmental delay    Chronic lung disease, such as COPD or cystic fibrosis    Heart disease, such as congenital heart disease, congestive heart failure, or coronary artery disease    Cerebrovascular disease, such as stroke or another condition affecting the blood vessels or blood supply to the brain    Blood disorders, such as sickle cell disease    Diabetes    Metabolic disorders such as inherited metabolic disorders or mitochondrial disease    Kidney disorders    Liver disorders    Weakened immune system due to illness or medications such as chemotherapy or steroids    Children under the age of 19 who are on long-term aspirin therapy    Extreme obesity (BMI > 40)   Do you have any of these conditions? Scroll to see all options. Select all that apply.    None of the above   Not selected:    Aspirin triad (also known as Samter's triad or ASA triad)    Asthma or hives from taking aspirin or other NSAIDs, such as ibuprofen or naproxen    Blockage or narrowing of the blood vessels of the heart    Blood clotting disorder    Blood dyscrasia, such anemia, leukemia, lymphoma, or  myeloma    Bone marrow depression    Catecholamine-releasing paraganglioma    Congenital long QT syndrome    Depression    Difficulty urinating or completely emptying your bladder    Uncorrected electrolyte abnormalities    Fungal infection    Gastrointestinal (GI) bleeding    Gastrointestinal (GI) obstruction    G6PD deficiency    Recent heart attack    High blood pressure    Irregular heartbeat or heart rhythm    Mononucleosis (mono)    Myasthenia gravis    Parkinson's disease    Pheochromocytoma    Reye syndrome    Seizure disorder    Thyroid disease    Ulcerative colitis   Have you ever had either of these conditions? Select all that apply.    No   Not selected:    Metoclopramide-associated dystonic reaction    Tardive dyskinesia   Just a few more questions about medications, and then you're finished. Have you used any non-prescription medications or nasal sprays for your current symptoms? Examples include saline sprays, decongestants, NyQuil, and Tylenol. Select one.    Yes   Not selected:    No   Which of these non-prescription medications have you tried? Scroll to see all options. Select all that apply.    Acetaminophen (Tylenol)    Fluticasone (Flonase)    Ibuprofen (Advil, Motrin, Midol)   Not selected:    Budesonide (Rhinocort)    Cetirizine (Zyrtec)    Chlorpheniramine (Aller-chlor, Chlor-Trimeton)    Cromolyn (NasalCrom)    Dextromethorphan (Delsym, Robitussin, Vicks DayQuil Cough)    Diphenhydramine (Benadryl)    Fexofenadine (Allegra)    Guaifenesin (Mucinex)    Guaifenesin/dextromethorphan (Delsym DM, Mucinex DM, Robitussin DM)    Ketotifen (Alaway, Zaditor)    Loratadine (Alavert, Claritin)    Naphazoline-pheniramine (Naphcon-A, Opcon-A, Visine-A)    Omeprazole (Prilosec)    Oxymetazoline (Afrin)    Phenylephrine (Sudafed PE)    Triamcinolone (Nasacort)    None of the above   Have you taken any monoamine oxidase inhibitor (MAOI) medications in the last 14 days? Examples include rasagiline (Azilect),  selegiline (Eldepryl, Zelapar), isocarboxazid (Marplan), phenelzine (Nardil), and tranylcypromine (Parnate). Select one.    No, not that I know of   Not selected:    Yes   Do you take Kynmobi or Apokyn (apomorphine)? Select one.    No   Not selected:    Yes   Are you still taking these medications listed in your medical record? If you're not taking any of these, click Next. Select all that apply.    norethindrone 0.35 MG tablet    Wegovy 1 MG/0.5ML solution auto-injector   Are you taking any other medications, vitamins, or supplements? Select one.    No   Not selected:    Yes   Have you ever had an allergic or bad reaction to any medication? Select one.    No   Not selected:    Yes   Are you allergic to milk or to the proteins found in milk (for example, whey or casein)? A milk allergy is different from lactose intolerance. Select one.    No, not that I know of   Not selected:    Yes   Have you ever had jaundice or liver problems as a result of taking amoxicillin-clavulanate (Augmentin)? Jaundice is a condition in which the skin and the whites of the eyes turn yellow. Select all that apply.    No, not that I know of   Not selected:    Yes, jaundice    Yes, liver problems   Have you ever had jaundice or liver problems as a result of taking azithromycin (Zithromax, Zmax)? Jaundice is a condition in which the skin and the whites of the eyes turn yellow. Select all that apply.    No, not that I know of   Not selected:    Yes, jaundice    Yes, liver problems   Do you need a doctor's note? A doctor's note confirms that you received care today and states when you can return to school or work. It does not contain information about your diagnosis or treatment plan. Your provider will make the final decision on whether to give you a doctor's note and for how long. Doctor's notes CANNOT be backdated. We can't provide medical leave paperwork through this type of visit. If more paperwork is needed to request time off, contact  your primary care provider. Select one.    No   Not selected:    Today only (1 day)    Today and tomorrow (2 days)    3 days    5 days    7 days   Is there anything you'd like to add about your symptoms? Please limit your comments to the symptoms asked about in this interview. If you include comments about other concerns, your provider may recommend that you be seen in person.    __The chest pain is soreness from coughing deeply and often. My entire torso hurts and my low bell is sore like I've pulled something. My kids had rsv a few weeks ago and I had mild symptoms right after but the cough seems to be getting worse. __   ----------   Medical history   The following information was received from the EMR on December 19, 2023.   Allergies:    No Known Allergies   - Allergy Type:   - Reaction:   - Severity: None   - Clinical Status: Active   - Verification Status: Confirmed   Medications:    NASAL SPRAY SINUS NA   - Route: Nasal   - Start Date: November 04, 2022   - End Date: None   - Status: Active    MAGNESIUM 400 MG PO CAPS   - Route: Oral   - Start Date: August 29, 2023   - End Date: None   - Status: Active    norethindrone (MICRONOR) tablet 0.35 mg   - Route: Oral   - Start Date: August 31, 2023   - End Date: None   - Status: Active    promethazine-dextromethorphan (PROMETHAZINE-DM) syrup 6.25-15 mg/5mL   - Route: Oral   - Start Date: December 19, 2023   - End Date: None   - Status: Active    PRENATAL + DHA PO   - Route: Oral   - Start Date: July 17, 2020   - End Date: None   - Status: Active    methylPREDNISolone (MEDROL) tablet dose pack (21 ct)   - Route:   - Start Date: December 14, 2023   - End Date: None   - Status: Active    WEGOVY 1 MG/0.5ML SC SOAJ   - Route: Subcutaneous   - Start Date: November 03, 2023   - End Date: None   - Status: Active    ibuprofen (MOTRIN) tablet   - Route:   - Start Date: December 05, 2023   - End Date: None   - Status: Active    azithromycin (ZITHROMAX) tablet   - Route:    - Start Date: 2023   - End Date: None   - Status: Active   Problem list:    Class 3 severe obesity due to excess calories without serious comorbidity in adult   - Category: Problem List Item   - Health Status:   - Start Date: 2019   - End Date: None   - Status: Active    Encounter for preconception consultation   - Category: Problem List Item   - Health Status:   - Start Date: 2019   - End Date: 2020   - Status: Resolved    Hyperglycemia   - Category: Problem List Item   - Health Status:   - Start Date: 2019   - End Date: None   - Status: Active    Well adult exam   - Category: Problem List Item   - Health Status:   - Start Date: 2019   - End Date: 2020   - Status: Resolved    Elevated blood pressure reading in office with white coat syndrome, without diagnosis of hypertension   - Category: Problem List Item   - Health Status:   - Start Date: 2019   - End Date: None   - Status: Active    Obesity (BMI 35.0-39.9 without comorbidity)   - Category: Problem List Item   - Health Status:   - Start Date: 2019   - End Date: None   - Status: Inactive    Supervision of normal first pregnancy, antepartum   - Category: Problem List Item   - Health Status:   - Start Date: 2020   - End Date: None   - Status: Active    Placenta previa, third trimester   - Category: Problem List Item   - Health Status:   - Start Date: 2020   - End Date: 2022   - Status: Resolved    Abnormal glucose tolerance test (GTT) during pregnancy, antepartum   - Category: Problem List Item   - Health Status:   - Start Date: 2020   - End Date: None   - Status: Active    Pregnancy   - Category: Problem List Item   - Health Status:   - Start Date: 2021   - End Date: None   - Status: Active    History of  delivery   - Category: Problem List Item   - Health Status:   - Start Date: 2022    - End Date: 2023   - Status: Resolved    History of    - Category: Problem List Item   - Health Status:   - Start Date: 2023   - End Date: None   - Status: Active    Abnormal weight gain   - Category: Problem List Item   - Health Status:   - Start Date: 2023   - End Date: None   - Status: Active    Solitary thyroid nodule   - Category: Problem List Item   - Health Status:   - Start Date: 2023   - End Date: None   - Status: Active    Neck pain   - Category: Problem List Item   - Health Status:   - Start Date: 2023   - End Date: None   - Status: Active    Thyroid nodule   - Category: Problem List Item   - Health Status:   - Start Date: 2023   - End Date: None   - Status: Active    Hot flashes   - Category: Problem List Item   - Health Status:   - Start Date: 2023   - End Date: None   - Status: Active

## 2023-12-28 ENCOUNTER — SPECIALTY PHARMACY (OUTPATIENT)
Dept: ENDOCRINOLOGY | Age: 33
End: 2023-12-28
Payer: COMMERCIAL

## 2023-12-28 NOTE — PROGRESS NOTES
Specialty Pharmacy Refill Coordination Note     Spoke to patient about resuming wegovy post small stop due to side effects, patient has yet to restart and we will check back in within 3 weekds     Cee Laurent, Pharmacy Technician  Specialty Pharmacy Technician

## 2024-01-07 ENCOUNTER — E-VISIT (OUTPATIENT)
Dept: FAMILY MEDICINE CLINIC | Facility: TELEHEALTH | Age: 34
End: 2024-01-07
Payer: COMMERCIAL

## 2024-01-07 NOTE — E-VISIT ESCALATED
Patient escalated   Provider ARIES Youngblood chose to escalate patient to another level of care because: Patient's free text comments   Patient was sent the following message:   Based on the information you've provided us, you need to take another step to get care.   What to do now:   Urgent Care   You should be seen within the next 24 hours.   Please go to a walk-in clinic or urgent care, or make an appointment to be seen within the next 24 hours.   You won't be charged for your eVisit. If you paid with a credit card, the charge will be reversed.   Chief Complaint: Migraine or headache   Patient introduction   Patient is 33-year-old female who has unilateral headache pain.   Warning. The following may warrant further investigation:    Headaches have worsened by increasing in frequency and coming on more quickly    Recurring night sweats    Prolonged, severe fatigue    Pain wakes patient from sleep, is worse when patient bends over, and is worse when patient is upright (and better when they are lying down)   General presentation   Patient's headache pain is unbearable. Headache has lasted 4 to 72 hours. Headache reached maximum intensity after more than 1 hour. Headache pain described as dull pain, fullness, and throbbing/pulsating. Patient uncertain whether pain is worse with Valsalva maneuvers.   Pain is not brought on by intense physical activity. Also has nasal congestion, nasal discharge, and facial pain.   Migraine features:    Pain is accompanied by nausea, photophobia, phonophobia, aggravation of pain with physical activity, and decreased ability to perform regular daily activities.    In the 1 to 2 days leading up to headache, patient had increased yawning and neck stiffness.    No auditory aura, motor aura, negative aura, somatosensory aura, visual aura, or changes in speech.    Has postdrome feeling drained or exhausted and pain in the location of the previous headache with sudden head movement.    No  blurry vision with neck flexion, diminished peripheral vision, double vision, severe reduction in vision, halos, or complete vision loss in one eye.   Potential triggers: alcohol, artificial sweeteners, visual stimuli, sleep pattern changes, hunger, stress, and weather changes.   Patient has not made recent changes in hormonal birth control method.   Patient has had similar headaches in the past. Has not seen a healthcare provider for their headaches. Patient has not had a headache every day in the past month. In the past 3 months, patient has had headaches on 2 to 14 days per month.   No known family history of migraine.   Patient is not using any medications to treat their headache.   Prescription medications:   Has not used any prescription medications for headache symptoms.   Non-prescription medications:   The following non-prescription medications were not helpful for headache symptoms:    Acetaminophen    Aspirin    Excedrin Extra Strength/Migraine (acetaminophen/aspirin/caffeine)    Excedrin PM Headache (acetaminophen/aspirin/diphenhydramine)    Excedrin Tension Headache (acetaminophen/caffeine)    Ibuprofen    Naproxen sodium   Review of red flags/alarm symptoms:    No exposure to carbon monoxide    No recent head trauma    No shingles prodrome symptoms    No confusion    No impaired alertness    No altered speech    No loss of coordination    No prolonged limb numbness    No seizures    No diaphoresis or tachycardia    No current use of blood thinning medication    No exposure to toxic chemicals/ingredients   Self-exam:    No nuchal rigidity    Able to walk on toes/heels    Able to rise from a seated position without support    Pericranial muscle tenderness in the occiput, neck, and upper shoulder   Pregnancy/menstrual status/breastfeeding:   Not pregnant. Pre-eclampsia screening: Patient has not had a baby in last 6 weeks. Not breastfeeding. Regarding date of last menstrual period, patient writes:  December 25, 2023.   Past medical history   Immune conditions: No immunocompromising conditions.   No history of cancer.   No COPD, Lyme disease, obesity hypoventilation syndrome, or obstructive sleep apnea.   Social history   Patient does not smoke tobacco. No recreational drug use.   Current medications   Currently taking NASAL SPRAY SINUS NA, norethindrone 0.35 MG tablet, and Wegovy 1 MG/0.5ML solution auto-injector.   Medication allergies   None.   Medication contraindication review   No history of ASA- or NSAID-induced asthma or urticaria, aspirin triad, bone marrow depression, phenothiazine-induced blood dyscrasia, catecholamine-releasing paraganglioma, coagulation disorder, depression, G6PD deficiency, GI bleeding, GI obstruction, uncontrolled hypertension, Parkinson's disease, Reye syndrome, or seizure disorder.   No history of cardiac accessory conduction pathway disorder, cerebrovascular disease, coronary vasospasm, chronic kidney disease, hepatic impairment (severe), ischemic bowel disease, ischemic heart disease, migraine (basilar or hemiplegic), PVD, sepsis, vascular surgery (recent), or WPW syndrome.   Patient-submitted comments   Patient was asked if they had anything to add about their symptoms. Patient writes: They come on at or around the middle of the day and are debilitating by late afternoon. It's like a combo of sinus and tension. It feels like if I told remove my eyeball I'd feel better. .   Patient did not request an excuse note.   Assessment:   Patient determined to need a level of care not appropriate to be delivered through eVisit.   Plan:   Patient informed of need to seek in-person care   ----------   Electronically signed by ARIES Youngblood on 2024-01-07 at 10:19AM   ----------   Patient Interview Transcript:   Where do you feel your headache pain? Select one.    On one side of my head (including forehead or back of head)   Not selected:    On both sides of my head (or all over)    I'm  not sure   How long have you had this headache? Select one.    4 hours to 3 days   Not selected:    Less than 30 minutes    30 minutes to 3 hours    More than 3 days    I don't currently have a headache   How would you describe your headache? Select all that apply.    Dull pain    A feeling of head fullness    It's throbbing or pulsating   Not selected:    Band-like pressure or tightening    Pressure-like pain    It feels like I'm wearing a tight cap    None of the above   On a scale of 1 to 10, how severe is your headache pain? If you have a very severe headache, you may need to be seen in person. Select one.    __Unbearable; intense pain that prevents me from doing anything __   Not selected:    Mild; pain is noticeable and distracting, but I can do everyday activities    Moderate; strong pain that makes everyday activities harder    Severe; intense pain that prevents me from doing some everyday activities    The worst headache of my life   How long did it take for your headache to reach the greatest intensity? The pain of most headaches builds gradually. In rare cases, headache pain comes on suddenly, like a clap of thunder or an explosion, and the pain reaches greatest intensity within 1 minute. Select one.    Longer than 1 hour   Not selected:    Less than 1 minute    1 to 5 minutes    5 to 30 minutes    30 to 60 minutes   Do you currently have any of these symptoms? Select all that apply.    Nausea    Sensitivity to light    Sensitivity to noise    Headache pain that's worse with physical activity (walking or climbing stairs)    Decreased ability to work, study, or do regular daily activities for at least one day   Not selected:    Vomiting    None of the above   Are any of these true for your headache? Select all that apply.    It wakes me from sleep    It's worse when I bend over    It's worse when I'm upright (and better when I'm lying down)   Not selected:    It's gotten better, but it hasn't gone away     It went away, but then it came back    It's worse in the morning    None of the above   Do you currently have any of these symptoms? Select all that apply.    Nasal congestion    Thick yellow or green nasal drainage (mucus)    Pain around the eyes or cheeks   Not selected:    Fever    None of these   Do you currently have any of these symptoms? Select all that apply.    None of the above   Not selected:    Confusion    Difficulty staying awake or alert    Difficulty speaking or understanding speech    Difficulty walking    Numbness, tingling, or weakness in any of your limbs that doesn't go away    Seizures    Severe jaw pain    Painful rash that looks like a band of blisters on the same side of your head or face as your headache   Headaches can be a sign of a serious underlying condition. Within the last month, have you had any of these symptoms? Select all that apply.    Recurring night sweats    Severe fatigue that doesn't improve with rest   Not selected:    Unexplained fever (no clear source or obvious infection)    Unintentional weight loss    None of the above   Have you had any of these vision changes? Select all that apply.    None of the above   Not selected:    Blurry vision when moving your chin toward your chest    Loss of peripheral vision (can't see out of the corners of the eyes)    Double vision    Severe difficulty seeing    Seeing halos (bright circles) around lights    Complete loss of vision in one eye   Sore or tight muscles in the neck and shoulders can play a part in causing headaches. Gently press against your head and neck muscles, making small circular movements on the muscles. Do this on both your right and left sides.    Back of head    Neck    Upper shoulder   Not selected:    Forehead    Jaw    None of the above   Is your headache pain worse with Valsalva maneuvers? A common Valsalva maneuver involves closing your mouth, plugging your nose shut, and breathing out gently in a way that  "\"pops\" your ears. Bearing down as if having a bowel movement is another example of a Valsalva maneuver. Select one.    I'm not sure   Not selected:    Yes    No   Can you move your chin toward your chest?    Yes   Not selected:    No, my neck is too stiff   We'd like you to perform three simple tasks: 1. Get up from a seated position without using your arms (or anyone's help) to push yourself up 2. Walk on your toes for 10 steps 3. Walk on your heels for 10 steps Can you perform all three tasks? Select one.    Yes   Not selected:    No, I can't do one or more of the tasks   Do any of these seem to trigger your headache symptoms? Select all that apply.    Alcohol    Artificial sweeteners (aspartame)    Bright lights or visual stimuli    Changes in sleep pattern    Hunger    Stress    Weather changes   Not selected:    Certain foods, such as cured meats or cheese    Certain smells    No, not that I know of   Does your headache only occur during or after intense physical activity (jogging, weight lifting, or having sex)? Select one.    No   Not selected:    Yes   In the last 2 weeks, have you had any kind of injury or trauma to your head? Examples of injuries might include hitting your head or being hit in the head. Select one.    No   Not selected:    Yes   In the last month, have you had any known exposure to toxic chemicals? For example, this might include accidentally swallowing antifreeze or inhaling paint fumes. Select one.    No, not that I know of   Not selected:    Yes   Have you had any recent exposure to carbon monoxide? Common sources of carbon monoxide include motor vehicle exhaust, smoke from fires, engine fumes, and non-electric heaters. Select one.    No, not that I know of   Not selected:    Yes   Have you ever had a similar headache in the past? By similar, we mean the location of the pain is the same, the headache feels the same, and the associated symptoms are the same. Select one.    Yes   Not " selected:    No   Does it feel like your headaches have gotten worse? For example, are they coming on faster, happening more often, or becoming more painful? Select one.    Yes, definitely   Not selected:    No, not necessarily    I'm not sure   How have your headaches gotten worse? Select all that apply.    I have more frequent headaches    My headaches are coming on more quickly   Not selected:    My headache pain is getting more intense    My headaches are interfering more with my ability to do everyday activities    Other (specify)   Over the past month, have you had a headache every day? Select one.    No   Not selected:    Yes   Over the past 3 months, how many days per month have you had a headache? Select one.    2 to 14 days per month   Not selected:    1 day or less per month    15 or more days per month   How many days per week do you use medication for a headache? This includes both non-prescription and prescription medications. Select one.    I haven't used any medications   Not selected:    0 to 1 day per week    1 to 2 days per week    3 or more days per week   Have you ever seen a healthcare provider for your headaches? Select one.    No   Not selected:    Yes   Do you have a family history of migraine headaches? Select one.    No, not that I know of   Not selected:    Yes   In the 1 to 2 days leading up to your headaches, do you have any of these symptoms? Select all that apply.    Increased yawning    Neck stiffness   Not selected:    Constipation    Depression    Euphoria (feeling intensely excited or happy)    Food cravings    Irritability    No, not that I've noticed   Just before or at the beginning of your headaches, do you have any of these symptoms? Some headaches can be associated with other sensory or physical symptoms. They usually last no longer than an hour and then go away completely. Select all that apply.    None of the above   Not selected:    Ringing in your ears or hearing other  "noises or music (auditory aura)    Jerking or other repetitive physical movements (motor aura)    Loss of vision, hearing, feeling, or ability to move a body part (negative aura)    Burning, pain, or tingling on any part of your body (somatosensory aura)    Seeing bright lines, shapes, or objects (visual aura)    Difficulty finding words or other changes in speech   Once the headache pain goes away, do you have any of these symptoms? Select all that apply.    Feeling drained or exhausted    Pain in the location of the previous headache with sudden head movement   Not selected:    Mild euphoria (excitement or happiness)    None of the above   Are your headaches associated with \"attacks\" or \"spells\" of excessive sweating or fast heart rate? Select all that apply.    No, neither of these   Not selected:    Excessive sweating    Fast heart rate   To make a treatment plan that's safe for you, we need to ask about your health history. Do you regularly take any blood thinning medications? Examples include aspirin, Coumadin (warfarin), Aggrenox (aspirin/dipyridamole), Plavix (clopidogrel), Pradaxa (dabigatran), Xarelto (rivaroxaban), Eliquis (apixaban) and Savaysa (edoxaban). Select one.    No   Not selected:    Yes   Do you smoke tobacco? Select one.    No, never   Not selected:    Yes, every day    Yes, some days    No, I quit   In the last month, have you used amphetamines, cocaine, hallucinogens, heroin, or other opioids? Your response to this question will be shared with your care provider only. Select one.    No   Not selected:    Yes   Do you have any of these conditions? Select all that apply.    None of the above   Not selected:    Chronic obstructive pulmonary disease (COPD)    Lyme disease    Obesity hypoventilation syndrome (also known as Pickwickian syndrome)    Obstructive sleep apnea   Do you have any of these conditions? Scroll to see all options. Select all that apply.    None of the above   Not selected:   "  Aspirin- or NSAID-induced asthma or urticaria    Aspirin triad, Samter's triad, or aspirin-exacerbated respiratory disease (AERD)    Bone marrow depression    Phenothiazine-induced anemia, leukemia, lymphoma, or myeloma    Catecholamine-releasing paraganglioma    Blood clotting disorder    Depression    G6PD deficiency    Gastrointestinal (GI) bleeding    Gastrointestinal (GI) obstruction    Uncontrolled hypertension    Parkinson's disease    Reye syndrome    Seizure disorder   Do you have any of these conditions that can affect the immune system? Scroll to see all options. Select all that apply.    None of these   Not selected:    History of bone marrow transplant    Chronic kidney disease    Chronic liver disease (including cirrhosis)    HIV/AIDS    Inflammatory bowel disease (Crohn's disease or ulcerative colitis)    Lupus    Moderate to severe plaque psoriasis    Multiple sclerosis    Rheumatoid arthritis    Sickle cell anemia    Alpha or beta thalassemia    History of kidney, liver, heart, or other solid organ transplant    History of liver, heart, or other solid organ transplant    History of spleen removal    An autoimmune disorder not listed here (specify)    A condition requiring treatment with long-term use of oral steroids (such as prednisone, prednisolone, or dexamethasone) (specify)   Have you ever been diagnosed with cancer? Select one.    No   Not selected:    Yes, I have cancer now    Yes, but I'm in remission   Have you had any of these conditions or procedures? Scroll to see all options. Select all that apply.    None of the above   Not selected:    Cardiac accessory conduction pathway disorder-associated arrhythmia    Cerebrovascular disease (stroke or TIA)    Coronary vasospasm (angina)    Ischemic bowel disease    Ischemic heart disease (history of myocardial infarction or heart attack)    Kidney disease (chronic)    Liver disease    Metoclopramide-associated dystonic reaction    Migraine  diagnosed as basilar or hemiplegic    Peripheral vascular disease (PVD)    Sepsis    Tardive dyskinesia    Vascular surgery (recent)    Trini-Parkinson-White (WPW) syndrome   Are you pregnant? Select one.    No   Not selected:    Yes   Have you had a baby within the last 6 weeks? Select one.    No   Not selected:    Yes   When was your last menstrual period? If you don't currently have periods or no longer have periods, please briefly explain.    December 25, 2023   Do your headaches seem to be related to your menstrual cycle? For example, do you get a headache around the same time every month? Select one.    I'm not sure   Not selected:    Yes    No    I don't currently have periods or no longer have periods   Are you breastfeeding? Select one.    No   Not selected:    Yes   Have you recently made any changes to your hormonal birth control method (pill, ring, implant, injection, IUD with progesterone)? Select one.    No   Not selected:    Started using hormonal birth control    Stopped using hormonal birth control    Switched hormonal birth control methods    I don't use hormonal birth control   Have you used any non-prescription medications for your headache symptoms? Select one.    Yes   Not selected:    No   Acetaminophen (Tylenol)    Not helpful   Not selected:    Helpful   Aspirin    Not helpful   Not selected:    Helpful   Excedrin Extra Strength or Excedrin Migraine (acetaminophen/aspirin/caffeine)    Not helpful   Not selected:    Helpful   Excedrin PM Headache (acetaminophen/aspirin/diphenhydramine)    Not helpful   Not selected:    Helpful   Excedrin Tension Headache (acetaminophen/caffeine)    Not helpful   Not selected:    Helpful   Ibuprofen (Advil, Motrin)    Not helpful   Not selected:    Helpful   Naproxen sodium (Aleve)    Not helpful   Not selected:    Helpful   Have you used any prescription medications for your headache symptoms? Select one.    No   Not selected:    Yes   Are you still taking  these medications listed in your medical record? If you're not taking any of these, click Next. Select all that apply.    NASAL SPRAY SINUS NA    norethindrone 0.35 MG tablet    Wegovy 1 MG/0.5ML solution auto-injector   Are you taking any other medications, vitamins, or supplements? Select one.    No   Not selected:    Yes   Have you ever had an allergic or bad reaction to any medication? Select one.    No   Not selected:    Yes   Do you need a doctor's note? A doctor's note confirms that you received care today and states when you can return to school or work. It does not contain information about your diagnosis or treatment plan. Your provider will make the final decision on whether to give you a doctor's note. Doctor's notes CANNOT be backdated. Select one.    No   Not selected:    Today only (1 day)    Today and tomorrow (2 days)    3 days   Is there anything you'd like to add about your symptoms? Please limit your comments to the symptoms asked about in this interview. If you include comments about other concerns, your provider may recommend that you be seen in person.    __They come on at or around the middle of the day and are debilitating by late afternoon. It's like a combo of sinus and tension. It feels like if I told remove my eyeball I'd feel better. __   ----------   Medical history   Medical history data does not currently exist for this patient.

## 2024-01-08 ENCOUNTER — E-VISIT (OUTPATIENT)
Dept: FAMILY MEDICINE CLINIC | Facility: TELEHEALTH | Age: 34
End: 2024-01-08
Payer: COMMERCIAL

## 2024-01-08 NOTE — E-VISIT TREATED
Chief Complaint: Cold, flu, COVID, sinus, hay fever, or seasonal allergies   Patient introduction   Patient is 33-year-old female with cough, congestion, headache, and myalgia that started 3 to 5 days ago.   COVID-19 testing history, vaccination status, and exposure:    Has not been tested for COVID-19 since symptom onset.    Patient was prompted to take a self-test during the interview, but elected not to test now.    Has not received an updated [id number] COVID-19 vaccination (Pfizer-BioNTech or Moderna vaccine after September 12, 2023; or Novavax vaccine after October 3, 2023).    No known exposure to a person with a confirmed or suspected case of COVID-19.    No high-risk (household) exposure to COVID-19 within the last 14 days.   Risk factors for severe disease from COVID-19 infection    BMI >= 40.    Mostly sedentary lifestyle.   General presentation   Symptoms came on gradually.   Fever:    No fever.   Sinus and nasal symptoms:    Yellow nasal drainage.    Nasal drainage is thick.    Postnasal drip.    1 to 3 episodes of antibiotic treatment for sinus infection in the last year.    Sinus pain or pressure on or around the forehead, eyes, nose, cheeks, and upper teeth or jaw.    Patient first noticed sinus pain 5 to 9 days ago.    Sinus pain is worse with Valsalva.    No nasal discharge.    No itchy nose or sneezing.    No history of unhealed nasal septal ulcer/nasal wound.    No history of deviated septum or nasal polyps.   Throat symptoms:    No sore throat.   Head and body aches:    Headache described as moderate (4 to 6 on a scale of 1 to 10).    Myalgia.    No sweats.    No chills.    No fatigue.   Cough:    Cough is worse in the morning.    Cough is mildly productive of sputum.    Describes color of sputum as yellow.   Wheezing and shortness of breath:    Patient does not have a home pulse oximeter or other device that could measure their heart rate.    Has previously used an albuterol inhaler for URI,  "bronchitis, or pneumonia.    Has previously used a steroid inhaler for URI, bronchitis, or pneumonia.    Not using an albuterol inhaler for current symptoms because they do not feel they need it.    No COPD diagnosis.    No asthma diagnosis.    No wheezing.    No shortness of breath.   Chest pain:    No chest pain.   Ear symptoms:    Current symptoms include pressure, fullness, and a plugged or blocked sensation in the ear(s).   Dizziness:    Mild dizziness that does not interfere with daily activities.   Allergies:    Patient has known seasonal allergies.    Patient does not think symptoms are allergy-related.   Flu exposure:    No recent known exposure to a person with a confirmed flu diagnosis.    Received a flu vaccine more than 6 months ago.   Patient is taking over-the-counter medications for current symptoms, including acetaminophen, fluticasone, ibuprofen, and oxymetazoline.   Review of red flags/alarm symptoms:    No changes in alertness or awareness.    No paroxysmal cough followed by whoop on inspiration    No symptoms suggesting intracranial hemorrhage.    No decreased urination.    No blue or gray coloring present in face, lips, or nail beds.    No swelling, pain, redness, or increased warmth in the calf or lower part of ONE leg only.   Risk factors for antibiotic resistance:    Close contact with a child in .   Pregnancy/menstrual status/breastfeeding:    Not pregnant.    Not breastfeeding.    Regarding date of last menstrual period, patient writes: 12/25/2023.   Self-exam:    Height: 5' 6\"    Weight: 315 lbs    Neck lymph nodes feel normal.   Recent antibiotic use:    Has not taken antibiotics for similar symptoms within the past month.   Current medications   Currently taking NASAL SPRAY SINUS NA, norethindrone 0.35 MG tablet, and Wegovy 1 MG/0.5ML solution auto-injector.   Medication allergies   None.   Medication contraindication review   No history of anaphylactic reaction to beta-lactam " antibiotics; aspirin triad; blood dyscrasia; bone marrow depression; catecholamine-releasing paraganglioma; coronary artery disease; coagulation disorder; congenital long QT syndrome; depression; electrolyte abnormalities; fungal infection; GI bleeding; GI obstruction; G6PD deficiency; heart arrhythmia; hypertension; mononucleosis; myasthenia; recent myocardial infarction; NSAID-induced asthma/urticaria; Parkinson's disease; pheochromocytoma; porphyria; Reye syndrome; seizure disorder; ulcerative colitis; and urinary retention.   No history of metoclopramide-associated dystonic reaction and tardive dyskinesia.   No known history of amoxicillin-clavulanate-associated cholestatic jaundice or hepatic impairment.   No known history of azithromycin-associated cholestatic jaundice or hepatic impairment.   Past medical history   Immune conditions:   No immunocompromising conditions.   No history of cancer.   Social history   Never smoked tobacco.   High-risk household contacts:    Household contact under the age of 5.   Patient-submitted comments   Patient was asked if they had anything to add about their symptoms. Patient writes: The sinus pressure is causing my tinnitus to be really intense. I've had mild to severe headaches off and on for several days..   Patient did not request an excuse note.   Assessment   Viral sinusitis.   This is the likely diagnosis based on patient's interview responses, including:    Symptom profile    Duration of symptoms is shorter than 10 days   Plan   Medications:    benzonatate 200 mg capsule RX 200mg 1 cap PO tid PRN 7d for cough. Do not chew or cut these capsules. Amount is 21 cap.    pseudoephedrine 60mg tablet OTC 60mg 1 tab PO q6h PRN 5d for nasal congestion. Do not exceed 240mg (4 pills) in a 24-hour period. Amount is 20 tab.    Mucus Relief  mg tablet, extended release OTC 600mg 1 tab PO q12h PRN 7d for cough and congestion. Amount is 14 tab.   The patient's prescription will  be sent to:   [place] [address], [address] [facility] [address]   Phone: [phone]     Fax: [phone]   Patient informed to purchase OTC medications.   Education:    Condition and causes    Prevention    Treatment and self-care    When to call provider   ----------   Electronically signed by ARIES Youngblood on 2024-01-08 at 08:54AM   ----------   Patient Interview Transcript:   Which of these symptoms are bothering you? Select all that apply.    Cough    Stuffed-up nose or sinuses    Headache    Muscle or body aches   Not selected:    Shortness of breath    Runny nose    Itchy or watery eyes    Itchy nose or sneezing    Loss of smell or taste    Sore throat    Hoarse voice or loss of voice    Fever    Sweats    Chills    Fatigue or tiredness    Nausea or vomiting    Diarrhea    I don't have any of these symptoms   When did your current symptoms start? Select one.    3 to 5 days ago   Not selected:    Less than 48 hours ago    6 to 9 days ago    10 to 14 days ago    2 to 3 weeks ago    3 to 4 weeks ago    More than a month ago   Do you know the exact date your symptoms started? If so, enter the date as MM/DD/YY. Select one.    No   Not selected:    Yes (specify)   Did your symptoms come on suddenly or gradually? Select one.    Gradually   Not selected:    Suddenly    I'm not sure   Since your current symptoms started, have you been tested for COVID-19? This includes home self-tests as well as nose swab or saliva tests done at a doctor's office, lab, or testing site. Select one.    No   Not selected:    Yes   Taking a home COVID test can help your provider give you the best care. - If you have a COVID test kit, take the test now before continuing this interview. - If you choose not to take a test or don't have one, you should still continue this interview. Your provider can still help you get care. Do you have a COVID test kit? Select one.    Yes, but I prefer not  "to take a test now   Not selected:    Yes, and I'll take a test now    No, I don't have a test kit   Has anyone in your household tested positive for COVID-19 in the past 14 days? Select one.    No   Not selected:    Yes   In the last 14 days, have you had close contact with someone who has COVID-19? \"Close contact\" means any of these: - Caring for someone with COVID-19. - Being within 6 feet of someone with COVID-19 for a total of at least 15 minutes over a 24-hour period. For example, three 5-minute exposures for a total of 15 minutes. - Being in direct contact with respiratory droplets from someone with COVID-19 (being coughed on, kissing, sharing utensils). Select one.    No, not that I know of   Not selected:    Yes, a confirmed case    Yes, a suspected case   Have you gotten the [id number] updated COVID-19 vaccine? This means either the updated Pfizer-BioNTech or Moderna vaccine after September 12, 2023; or the updated Novavax vaccine after October 3, 2023. Select one.    No   Not selected:    Yes   Since your symptoms started, have you felt dizzy? Select one.    Yes, but I can still do my regular daily activities   Not selected:    Yes, and it makes it hard to stand, walk, or do daily activities    No   Do you have any of these devices at home? Select all that apply.    No   Not selected:    A home pulse oximeter    Home blood pressure monitor    Apple Watch or other smart watch    FitBit or other smart wristband    Other wearable device   You mentioned having a headache. On a scale of 1 to 10, how severe is your headache pain? Select one.    Moderate (4 to 6)   Not selected:    Mild (1 to 3)    Severe (7 to 9)    Unbearable (10)    The worst headache of my life (10+)   Do you cough so hard that it's made you gag or vomit? By gag, we mean has your coughing made you choke or dry heave? Select all that apply.    Yes, my coughing has made me gag   Not selected:    Yes, my coughing has made me vomit    No   Does " "your cough come in spasms of 10 to 20 coughs in a row, followed by a loud whoop when breathing in? Select one.    No   Not selected:    Yes   When is your cough the worst? Select all that apply.    In the morning, or when I wake up   Not selected:    During the day    At nighttime, or while I'm sleeping    I haven't noticed a difference depending on time of day   Are you coughing up mucus or phlegm? Select one.    Yes, a little   Not selected:    No, my cough is dry    Yes, a lot   What color is most of the mucus or phlegm that you're coughing up? Select one.    Yellow or yellowish   Not selected:    Clear    White/frothy    Green or greenish    Red or pink    I'm not sure   Do you feel sinus pain or pressure in any of these areas?    In my forehead    Around my eyes    Behind my nose    In my cheeks    In my upper teeth or jaw   Not selected:    No   When did you first notice your sinus pain or pressure? Select one.    5 to 9 days ago   Not selected:    Less than 5 days ago    10 to 14 days ago    2 to 4 weeks ago    1 month ago or longer   Does coughing, sneezing, or leaning forward make your sinuses feel worse? Select one.    Yes   Not selected:    No   What color is your nasal drainage? Select one.    Yellow or yellowish   Not selected:    Clear    White    Green or greenish    My nose is stuffed but not draining or running   Is your nasal drainage thick or thin? Select one.    Thick   Not selected:    Thin   Is there any drainage (mucus) going down the back of your throat? This kind of drainage is also called \"postnasal drip.\" It can cause frequent throat clearing. Select one.    Yes   Not selected:    No, not that I know of   Do you have chest pain? You might also feel it as discomfort, aching, tightness, or squeezing in the chest. Select one.    No   Not selected:    Yes   Have you urinated at least 3 times in the last 24 hours? Select one.    Yes   Not selected:    No   Do your face, lips, or nail beds " appear blue or gray? Select one.    No   Not selected:    Yes   Do you have any swelling, pain, redness, or increased warmth in the calf or lower part of ONE leg only? Select one.    No   Not selected:    Yes   Changes in alertness or awareness may mean you need emergency care. Since your symptoms started, have you had any of these? Select all that apply.    None of the above   Not selected:    Confusion    Slurred speech    Not knowing where you are or what day it is    Difficulty staying conscious    Fainting or passing out   Do your symptoms include a whistling sound, or wheezing, when you breathe? Select one.    No   Not selected:    Yes   Do you have any of these symptoms in your ear(s)? Select all that apply.    Pressure    Fullness    Plugged or blocked sensation   Not selected:    Pain    Crackling or popping    None of the above   Are your glands/lymph nodes swollen, or does it hurt when you touch them?    No, not that I can tell   Not selected:    Yes   In the past week, has anyone around you (such as at school, work, or home) had a confirmed diagnosis of the flu? A confirmed diagnosis means that a nose swab was done to verify a flu infection. Select all that apply.    No, not that I know of   Not selected:    I live with someone who has the flu    I've been within touching distance of someone who has the flu    I've walked by, or sat about 3 feet away from, someone who has the flu    I've been in the same building as someone who has the flu   Have you ever been diagnosed with asthma? Select one.    No   Not selected:    Yes   Have you ever been prescribed albuterol to use for wheezing, cough, or shortness of breath caused by a cold, bronchitis, or pneumonia? Albuterol (ProAir, Proventil, Ventolin) is prescribed as an inhaler or a solution to be used with a nebulizer machine. Select one.    Yes   Not selected:    No, not that I know of   Have you ever been prescribed a steroid inhaler to use for wheezing,  cough, or shortness of breath caused by a cold, bronchitis, or pneumonia? Some examples of steroid inhalers include Pulmicort, Flovent, Qvar, and Alvesco. Select one.    Yes   Not selected:    No, not that I know of   Have you used albuterol for your current symptoms? This includes both albuterol inhalers and albuterol solution in a nebulizer machine. Select one.    No, I don't feel like I need it   Not selected:    Yes    No, I don't have any, or it's    Do you need a refill of albuterol? Select one.    No   Not selected:    Yes   Have you ever been diagnosed with chronic obstructive pulmonary disease (COPD)? Select one.    No, not that I know of   Not selected:    Yes   In the past month, have you taken antibiotics for similar symptoms? Examples of antibiotics include amoxicillin, amoxicillin-clavulanate (Augmentin), penicillin, cefdinir (Omnicef), doxycycline, and clindamycin (Cleocin). Select one.    No   Not selected:    Yes (specify)   In the last year, how many times were you treated with antibiotics for a sinus infection? Select one.    1 to 3 times   Not selected:    None    4 or more times   Have you been diagnosed with a deviated septum or nasal polyps? The nose is divided into two nostrils by the septum. A crooked septum is called a deviated septum. Nasal polyps are growths inside the nose or sinuses. Select one.    No, not that I know of   Not selected:    Yes, but I had surgery to treat them    Yes, I have a deviated septum    Yes, I have nasal polyps    Yes, I have a deviated septum and nasal polyps   Do you have a sore inside your nose that won't heal? Select one.    No, not that I know of   Not selected:    Yes   Do you have allergies (pollen, dust mites, mold, animal dander)? Select one.    Yes   Not selected:    No, not that I know of   What kind of allergies do you have? Select all that apply.    Seasonal allergies (hay fever)   Not selected:    Perennial, or year-round, allergies (hay  fever)    Pet allergies    Dust allergies    None of the above    I'm not sure   Do you think your symptoms could be allergy-related? Select one.    No   Not selected:    Yes    I'm not sure   Have you had a flu shot this season? Select one.    Yes, more than 6 months ago   Not selected:    Yes, less than 2 weeks ago    Yes, 2 to 4 weeks ago    Yes, 1 to 3 months ago    Yes, 3 to 6 months ago    No   Are you pregnant? Select one.    No   Not selected:    Yes   When was your last menstrual period? If you don't currently have periods or no longer have periods, please briefly explain.    2023   Within the last 2 weeks, have you: - Given birth - Had a miscarriage - Had a pregnancy loss - Had an  Being postpartum (live birth or loss) within the last 2 weeks increases your risk of flu complications. Select one.    No   Not selected:    Yes   Are you breastfeeding? Select one.    No   Not selected:    Yes   The flu and COVID-19 can be more serious for people in certain groups. The next few questions help us figure out if you or anyone you live with is at higher risk for complications from these infections. Do any of these statements apply to you? Select all that apply.    None of the above   Not selected:    I'm     I'm     I'm Black    I'm  or    Do you smoke tobacco? Select one.    No   Not selected:    Yes, every day    Yes, some days    No, I quit   Do you have a mostly inactive lifestyle? Answer yes if all of these are true: - You spend at least 6 hours a day sitting or lying down - You get less than 2 and a half hours per week of moderate exercise such as walking fast - You get less than 1 hour and 15 minutes per week of intense exercise such as jogging or running Select one.    Yes   Not selected:    No   Do you have any of these conditions? Select all that apply.    None of the above   Not selected:    Chronic lung disease, such as cystic fibrosis or  interstitial fibrosis    Heart disease, such as congenital heart disease, congestive heart failure, or coronary artery disease    Disorder of the brain, spinal cord, or nerves and muscles, such as dementia, cerebral palsy, epilepsy, muscular dystrophy, or developmental delay    Metabolic disorder or mitochondrial disease    Cerebrovascular disease, such as stroke or another condition affecting the blood vessels or blood supply to the brain    Down syndrome    Mood disorder, including depression or schizophrenia spectrum disorders    Substance use disorder, such as alcohol, opioid, or cocaine use disorder    Tuberculosis    Primary immunodeficiency   Do you live in a group care setting? Examples include: - Nursing home - Residential care - Psychiatric treatment facility - Group home - [facility] - [facility] and care home - Homeless shelter - Foster care setting Select one.    No   Not selected:    Yes   Are you a healthcare worker? Select one.    No   Not selected:    Yes   People with a very high body mass index (BMI) are at higher risk for developing complications from the flu and severe illness from COVID-19. To determine your BMI, we need to know your weight and height. Please enter your weight (in pounds).    Weight   Please enter your height.    Height   Do you have any of these conditions that can affect the immune system? Scroll to see all options. Select all that apply.    None of these   Not selected:    History of bone marrow transplant    Chronic kidney disease    Chronic liver disease (including cirrhosis)    HIV/AIDS    Inflammatory bowel disease (Crohn's disease or ulcerative colitis)    Lupus    Moderate to severe plaque psoriasis    Multiple sclerosis    Rheumatoid arthritis    Sickle cell anemia    Alpha or beta thalassemia    History of kidney, liver, heart, or other solid organ transplant    History of liver, heart, or other solid organ transplant    History of spleen removal    An autoimmune disorder  not listed here (specify)    A condition requiring treatment with long-term use of oral steroids (such as prednisone, prednisolone, or dexamethasone) (specify)   Have you ever been diagnosed with cancer? Select one.    No   Not selected:    Yes, I have cancer now    Yes, but I'm in remission   Do any of these apply to you? Select all that apply.    I'm in close contact with a child in    Not selected:    I've been hospitalized within the last 5 days    I have diabetes    None of the above   The flu and COVID-19 can be more serious for people in certain groups. Do any of these apply to the people who live with you? Select all that apply.    Under age 5   Not selected:    Over age 65            Black     or     Pregnant    Has given birth, had a miscarriage, had a pregnancy loss, or had an  in the last 2 weeks    None of the above   Does any member of your household have any of these medical conditions? Select all that apply.    None of the above   Not selected:    Asthma    Disorders of the brain, spinal cord, or nerves and muscles, such as dementia, cerebral palsy, epilepsy, muscular dystrophy, or developmental delay    Chronic lung disease, such as COPD or cystic fibrosis    Heart disease, such as congenital heart disease, congestive heart failure, or coronary artery disease    Cerebrovascular disease, such as stroke or another condition affecting the blood vessels or blood supply to the brain    Blood disorders, such as sickle cell disease    Diabetes    Metabolic disorders such as inherited metabolic disorders or mitochondrial disease    Kidney disorders    Liver disorders    Weakened immune system due to illness or medications such as chemotherapy or steroids    Children under the age of 19 who are on long-term aspirin therapy    Extreme obesity (BMI > 40)   Do you have any of these conditions? Scroll to see all options. Select all that apply.    None of  the above   Not selected:    Aspirin triad (also known as Samter's triad or ASA triad)    Asthma or hives from taking aspirin or other NSAIDs, such as ibuprofen or naproxen    Blockage or narrowing of the blood vessels of the heart    Blood clotting disorder    Blood dyscrasia, such anemia, leukemia, lymphoma, or myeloma    Bone marrow depression    Catecholamine-releasing paraganglioma    Congenital long QT syndrome    Depression    Difficulty urinating or completely emptying your bladder    Uncorrected electrolyte abnormalities    Fungal infection    Gastrointestinal (GI) bleeding    Gastrointestinal (GI) obstruction    G6PD deficiency    Recent heart attack    High blood pressure    Irregular heartbeat or heart rhythm    Mononucleosis (mono)    Myasthenia gravis    Parkinson's disease    Pheochromocytoma    Reye syndrome    Seizure disorder    Thyroid disease    Ulcerative colitis   Have you ever had either of these conditions? Select all that apply.    No   Not selected:    Metoclopramide-associated dystonic reaction    Tardive dyskinesia   Just a few more questions about medications, and then you're finished. Have you used any non-prescription medications or nasal sprays for your current symptoms? Examples include saline sprays, decongestants, NyQuil, and Tylenol. Select one.    Yes   Not selected:    No   Which of these non-prescription medications have you tried? Scroll to see all options. Select all that apply.    Acetaminophen (Tylenol)    Fluticasone (Flonase)    Ibuprofen (Advil, Motrin, Midol)    Oxymetazoline (Afrin)   Not selected:    Budesonide (Rhinocort)    Cetirizine (Zyrtec)    Chlorpheniramine (Aller-chlor, Chlor-Trimeton)    Cromolyn (NasalCrom)    Dextromethorphan (Delsym, Robitussin, Vicks DayQuil Cough)    Diphenhydramine (Benadryl)    Fexofenadine (Allegra)    Guaifenesin (Mucinex)    Guaifenesin/dextromethorphan (Delsym DM, Mucinex DM, Robitussin DM)    Ketotifen (Alaway, Zaditor)     Loratadine (Alavert, Claritin)    Naphazoline-pheniramine (Naphcon-A, Opcon-A, Visine-A)    Omeprazole (Prilosec)    Phenylephrine (Sudafed PE)    Triamcinolone (Nasacort)    None of the above   Have you taken any monoamine oxidase inhibitor (MAOI) medications in the last 14 days? Examples include rasagiline (Azilect), selegiline (Eldepryl, Zelapar), isocarboxazid (Marplan), phenelzine (Nardil), and tranylcypromine (Parnate). Select one.    No, not that I know of   Not selected:    Yes   Do you take Kynmobi or Apokyn (apomorphine)? Select one.    No   Not selected:    Yes   Are you still taking these medications listed in your medical record? If you're not taking any of these, click Next. Select all that apply.    NASAL SPRAY SINUS NA    norethindrone 0.35 MG tablet    Wegovy 1 MG/0.5ML solution auto-injector   Are you taking any other medications, vitamins, or supplements? Select one.    No   Not selected:    Yes   Have you ever had an allergic or bad reaction to any medication? Select one.    No   Not selected:    Yes   Are you allergic to milk or to the proteins found in milk (for example, whey or casein)? A milk allergy is different from lactose intolerance. Select one.    No, not that I know of   Not selected:    Yes   Have you ever had jaundice or liver problems as a result of taking amoxicillin-clavulanate (Augmentin)? Jaundice is a condition in which the skin and the whites of the eyes turn yellow. Select all that apply.    No, not that I know of   Not selected:    Yes, jaundice    Yes, liver problems   Have you ever had jaundice or liver problems as a result of taking azithromycin (Zithromax, Zmax)? Jaundice is a condition in which the skin and the whites of the eyes turn yellow. Select all that apply.    No, not that I know of   Not selected:    Yes, jaundice    Yes, liver problems   Do you need a doctor's note? A doctor's note confirms that you received care today and states when you can return to school  or work. It does not contain information about your diagnosis or treatment plan. Your provider will make the final decision on whether to give you a doctor's note and for how long. Doctor's notes CANNOT be backdated. We can't provide medical leave paperwork through this type of visit. If more paperwork is needed to request time off, contact your primary care provider. Select one.    No   Not selected:    Today only (1 day)    Today and tomorrow (2 days)    3 days    5 days    7 days   Is there anything you'd like to add about your symptoms? Please limit your comments to the symptoms asked about in this interview. If you include comments about other concerns, your provider may recommend that you be seen in person.    The sinus pressure is causing my tinnitus to be really intense. I've had mild to severe headaches off and on for several days.   ----------   Medical history   Medical history data does not currently exist for this patient.

## 2024-01-17 ENCOUNTER — SPECIALTY PHARMACY (OUTPATIENT)
Dept: ENDOCRINOLOGY | Age: 34
End: 2024-01-17
Payer: COMMERCIAL

## 2024-01-17 NOTE — PROGRESS NOTES
Specialty Pharmacy Refill Coordination Note     Belem is a 33 y.o. female contacted today regarding refills of  1 specialty medication(s).    Reviewed and verified with patient:       Specialty medication(s) and dose(s) confirmed: yes    Refill Questions      Flowsheet Row Most Recent Value   Changes to allergies? No   Changes to medications? No   New conditions or infections since last clinic visit No   Unplanned office visit, urgent care, ED, or hospital admission in the last 4 weeks  No   How does patient/caregiver feel medication is working? Good   Financial problems or insurance changes  No   Since the previous refill, were any specialty medication doses or scheduled injections missed or delayed?  No   Does this patient require a clinical escalation to a pharmacist? No            Delivery Questions      Flowsheet Row Most Recent Value   Delivery method [facility]eline   Delivery address verified with patient/caregiver? Yes   Delivery address Home   Number of medications in delivery 1   Medication(s) being filled and delivered Norethindrone (Progestin Contraceptives - Oral)   Doses left of specialty medications 2 weeks   Copay verified? Yes   Copay amount $0   Copay form of payment No copayment ($0)                   Follow-up: 25 day(s)     Cee Laurent, Pharmacy Technician  Specialty Pharmacy Technician

## 2024-01-18 ENCOUNTER — E-VISIT (OUTPATIENT)
Dept: FAMILY MEDICINE CLINIC | Facility: TELEHEALTH | Age: 34
End: 2024-01-18

## 2024-01-18 PROCEDURE — BRIGHTMDVISIT: Performed by: NURSE PRACTITIONER

## 2024-01-18 NOTE — E-VISIT TREATED
Chief Complaint: Cold, flu, COVID, sinus, hay fever, or seasonal allergies   Patient introduction   Patient is 33-year-old female with congestion, nasal discharge, fever (which may have resolved; see below), sweats, chills, myalgia, and fatigue that started less than 48 hours ago. Regarding date of symptom onset, patient writes: 1/17/2024.   COVID-19 testing history, vaccination status, and exposure:    Patient was tested for COVID-19 within the last 24 hours. Test result was positive.    Has not received an updated [id number] COVID-19 vaccination (Pfizer-BioNTech or Moderna vaccine after September 12, 2023; or Novavax vaccine after October 3, 2023).   Risk factors for severe disease from COVID-19 infection    BMI >= 40.    Mostly sedentary lifestyle.   General presentation   Symptoms came on suddenly.   Fever:    Patient had a fever that lasted less than 24 hours, but it has resolved.    Highest temperature of 100.4F to 101.5F.    Patient's last fever was within the last 24 hours.   Sinus and nasal symptoms:    Nasal discharge.    Yellow nasal drainage.    Nasal drainage is thin.    Postnasal drip.    Sinus pain or pressure on or around the eyes, nose, cheeks, and upper teeth or jaw.    Patient first noticed sinus pain less than 5 days ago.    Sinus pain is worse with Valsalva.    No itchy nose or sneezing.   Throat symptoms:    No sore throat.   Head and body aches:    Sweats.    Chills.    Myalgia.    Fatigue.    No headache.   Cough:    No cough.   Wheezing and shortness of breath:    No COPD diagnosis.    No asthma diagnosis.    No wheezing.    No shortness of breath.   Chest pain:    No chest pain.   Ear symptoms:    Current symptoms include pressure and a plugged or blocked sensation in the ear(s).   Dizziness:    No dizziness.   Flu exposure:    Has not had a flu vaccine this season.   Patient is taking over-the-counter medications for current symptoms, including acetaminophen, ibuprofen, and  "oxymetazoline.   Review of red flags/alarm symptoms:    No changes in alertness or awareness.    No decreased urination.    No blue or gray coloring present in face, lips, or nail beds.    No swelling, pain, redness, or increased warmth in the calf or lower part of ONE leg only.    No proptosis.   Risk factors for antibiotic resistance:    Close contact with a child in .   Pregnancy/menstrual status/breastfeeding:    Not pregnant.    Not breastfeeding.    Regarding date of last menstrual period, patient writes: 12/25/23.   Self-exam:    Height: 5' 6\"    Weight: 315 lbs    Swollen/painful neck lymph nodes.   Current medications   Currently taking NASAL SPRAY SINUS NA and norethindrone 0.35 MG tablet.   Medication allergies   None.   Medication contraindication review   Patient may be eligible for treatment with Paxlovid. Use of Paxlovid with drugs that either induce CYP3 or are highly dependent on CYP3 for clearance may lead to significant drug interactions.   No history of anaphylactic reaction to beta-lactam antibiotics; aspirin triad; blood dyscrasia; bone marrow depression; catecholamine-releasing paraganglioma; coronary artery disease; coagulation disorder; congenital long QT syndrome; depression; electrolyte abnormalities; fungal infection; GI bleeding; GI obstruction; G6PD deficiency; heart arrhythmia; hypertension; mononucleosis; myasthenia; recent myocardial infarction; NSAID-induced asthma/urticaria; Parkinson's disease; pheochromocytoma; porphyria; Reye syndrome; seizure disorder; ulcerative colitis; and urinary retention.   No history of metoclopramide-associated dystonic reaction and tardive dyskinesia.   No known history of amoxicillin-clavulanate-associated cholestatic jaundice or hepatic impairment.   No known history of azithromycin-associated cholestatic jaundice or hepatic impairment.   Past medical history   Immune conditions:   No immunocompromising conditions.   No history of cancer.   " Social history   Never smoked tobacco.   High-risk household contacts:    Household contact under the age of 5.   Patient-submitted comments   Patient was asked if they had anything to add about their symptoms. Patient writes: Lots of sneezing.   Patient did not request an excuse note.   Assessment   COVID-19, confirmed by viral test.   This is the likely diagnosis based on patient's interview responses, including:    Symptom profile    Positive viral COVID-19 test result in the last 14 days   Plan   Medications:    Paxlovid 300 MG/100 MG Dose Pack RX 300mg/100mg 3 tabs PO BID 5d for COVID-19 infection. Take exactly as directed. You must finish the entire course of medication, even if you feel better after the first few days of treatment. Amount is 30 tab.    ondansetron 8 mg disintegrating tablet RX 8mg 1 tab PO q8h PRN 3d for nausea and/or vomiting. Amount is 9 tab.   The patient's prescriptions will be sent to:   [place] [id number]   Phone: [phone]     Fax: [phone]   Education:    Condition and causes    Prevention    Treatment and self-care    When to call provider   Additional Notes   Called pateint: Current medications Currently taking NASAL SPRAY SINUS NA and norethindrone 0.35 MG tablet. reviewed CMP on 12/14/2023: EGFR Result   60.0 mL/min/1.73 119.3   ----------   Electronically signed by ARIES Molina on 2024-01-18 at 08:26AM   ----------   Patient Interview Transcript:   Which of these symptoms are bothering you? Select all that apply.    Stuffed-up nose or sinuses    Runny nose    Fever    Sweats    Chills    Muscle or body aches    Fatigue or tiredness   Not selected:    Cough    Shortness of breath    Itchy or watery eyes    Itchy nose or sneezing    Loss of smell or taste    Sore throat    Hoarse voice or loss of voice    Headache    Nausea or vomiting    Diarrhea    I don't have any of these symptoms    When did your current symptoms start? Select one.    Less than 48 hours ago   Not selected:    3 to 5 days ago    6 to 9 days ago    10 to 14 days ago    2 to 3 weeks ago    3 to 4 weeks ago    More than a month ago   Do you know the exact date your symptoms started? If so, enter the date as MM/DD/YY. Select one.    Yes (specify): 1/17/2024   Not selected:    No   Did your symptoms come on suddenly or gradually? Select one.    Suddenly   Not selected:    Gradually    I'm not sure   Since your current symptoms started, have you been tested for COVID-19? This includes home self-tests as well as nose swab or saliva tests done at a doctor's office, lab, or testing site. Select one.    Yes   Not selected:    No   When was your most recent COVID-19 test? Select one.    Within the last 24 hours   Not selected:    Within the last week (specify date as MM/DD/YY)    7 to 14 days ago    15 to 30 days ago    More than 1 month ago   What was the result of your most recent COVID-19 test? Select one.    Positive   Not selected:    Negative   Have you gotten the [id number] updated COVID-19 vaccine? This means either the updated Pfizer-BioNTech or Moderna vaccine after September 12, 2023; or the updated Novavax vaccine after October 3, 2023. Select one.    No   Not selected:    Yes   Since your symptoms started, have you felt dizzy? Select one.    No   Not selected:    Yes, but I can still do my regular daily activities    Yes, and it makes it hard to stand, walk, or do daily activities   You mentioned having a fever. Do you have a fever now? Select one.    No, it's gone now   Not selected:    Yes, and I've had one since my symptoms started    Yes, but I didn't have one when my symptoms started   Did you take your temperature with a thermometer? Select one.    Yes   Not selected:    No, but it felt mild    No, but it felt high   What was the highest reading on the thermometer? Select one.    100.4 to 101.5F   Not selected:    Below  "100.4F    101.6 to 101.9F    102.0 to 103.0F    Above 103.0F   How long did your fever last? Select one.    Less than 24 hours   Not selected:    1 to 3 days    4 or more days   When did you last have a fever? Select one.    Within the last 24 hours   Not selected:    24 to 48 hours ago    More than 2 days ago   Do you feel sinus pain or pressure in any of these areas?    Around my eyes    Behind my nose    In my cheeks    In my upper teeth or jaw   Not selected:    In my forehead    No   When did you first notice your sinus pain or pressure? Select one.    Less than 5 days ago   Not selected:    5 to 9 days ago    10 to 14 days ago    2 to 4 weeks ago    1 month ago or longer   Does coughing, sneezing, or leaning forward make your sinuses feel worse? Select one.    Yes   Not selected:    No   What color is your nasal drainage? Select one.    Yellow or yellowish   Not selected:    Clear    White    Green or greenish    My nose is stuffed but not draining or running   Is your nasal drainage thick or thin? Select one.    Thin   Not selected:    Thick   Is there any drainage (mucus) going down the back of your throat? This kind of drainage is also called \"postnasal drip.\" It can cause frequent throat clearing. Select one.    Yes   Not selected:    No, not that I know of   Do you have chest pain? You might also feel it as discomfort, aching, tightness, or squeezing in the chest. Select one.    No   Not selected:    Yes   Have you urinated at least 3 times in the last 24 hours? Select one.    Yes   Not selected:    No   Do your face, lips, or nail beds appear blue or gray? Select one.    No   Not selected:    Yes   Do you have any swelling, pain, redness, or increased warmth in the calf or lower part of ONE leg only? Select one.    No   Not selected:    Yes   Changes in alertness or awareness may mean you need emergency care. Since your symptoms started, have you had any of these? Select all that apply.    None of the " above   Not selected:    Confusion    Slurred speech    Not knowing where you are or what day it is    Difficulty staying conscious    Fainting or passing out   Do your symptoms include a whistling sound, or wheezing, when you breathe? Select one.    No   Not selected:    Yes   Since your symptoms started, have you noticed that one or both of your eyes is bulging or poking out? Select one.    No   Not selected:    Yes   Do you have any of these symptoms in your ear(s)? Select all that apply.    Pressure    Plugged or blocked sensation   Not selected:    Pain    Fullness    Crackling or popping    None of the above   Are your glands/lymph nodes swollen, or does it hurt when you touch them?    Yes   Not selected:    No, not that I can tell   Have you ever been diagnosed with asthma? Select one.    No   Not selected:    Yes   Have you ever been diagnosed with chronic obstructive pulmonary disease (COPD)? Select one.    No, not that I know of   Not selected:    Yes   Have you had a flu shot this season? Select one.    No   Not selected:    Yes, less than 2 weeks ago    Yes, 2 to 4 weeks ago    Yes, 1 to 3 months ago    Yes, 3 to 6 months ago    Yes, more than 6 months ago   Are you pregnant? Select one.    No   Not selected:    Yes   When was your last menstrual period? If you don't currently have periods or no longer have periods, please briefly explain.    23   Within the last 2 weeks, have you: - Given birth - Had a miscarriage - Had a pregnancy loss - Had an  Being postpartum (live birth or loss) within the last 2 weeks increases your risk of flu complications. Select one.    No   Not selected:    Yes   Are you breastfeeding? Select one.    No   Not selected:    Yes   The flu and COVID-19 can be more serious for people in certain groups. The next few questions help us figure out if you or anyone you live with is at higher risk for complications from these infections. Do any of these statements apply to  you? Select all that apply.    None of the above   Not selected:    I'm     I'm     I'm Black    I'm  or    Do you smoke tobacco? Select one.    No   Not selected:    Yes, every day    Yes, some days    No, I quit   Do you have a mostly inactive lifestyle? Answer yes if all of these are true: - You spend at least 6 hours a day sitting or lying down - You get less than 2 and a half hours per week of moderate exercise such as walking fast - You get less than 1 hour and 15 minutes per week of intense exercise such as jogging or running Select one.    Yes   Not selected:    No   Do you have any of these conditions? Select all that apply.    None of the above   Not selected:    Chronic lung disease, such as cystic fibrosis or interstitial fibrosis    Heart disease, such as congenital heart disease, congestive heart failure, or coronary artery disease    Disorder of the brain, spinal cord, or nerves and muscles, such as dementia, cerebral palsy, epilepsy, muscular dystrophy, or developmental delay    Metabolic disorder or mitochondrial disease    Cerebrovascular disease, such as stroke or another condition affecting the blood vessels or blood supply to the brain    Down syndrome    Mood disorder, including depression or schizophrenia spectrum disorders    Substance use disorder, such as alcohol, opioid, or cocaine use disorder    Tuberculosis    Primary immunodeficiency   Do you live in a group care setting? Examples include: - Nursing home - Residential care - Psychiatric treatment facility - Group home - [facility] - Board and care home - Homeless shelter - Foster care setting Select one.    No   Not selected:    Yes   Are you a healthcare worker? Select one.    No   Not selected:    Yes   People with a very high body mass index (BMI) are at higher risk for developing complications from the flu and severe illness from COVID-19. To determine your BMI, we need to know your weight  and height. Please enter your weight (in pounds).    Weight   Please enter your height.    Height   Do you have any of these conditions that can affect the immune system? Scroll to see all options. Select all that apply.    None of these   Not selected:    History of bone marrow transplant    Chronic kidney disease    Chronic liver disease (including cirrhosis)    HIV/AIDS    Inflammatory bowel disease (Crohn's disease or ulcerative colitis)    Lupus    Moderate to severe plaque psoriasis    Multiple sclerosis    Rheumatoid arthritis    Sickle cell anemia    Alpha or beta thalassemia    History of kidney, liver, heart, or other solid organ transplant    History of liver, heart, or other solid organ transplant    History of spleen removal    An autoimmune disorder not listed here (specify)    A condition requiring treatment with long-term use of oral steroids (such as prednisone, prednisolone, or dexamethasone) (specify)   Have you ever been diagnosed with cancer? Select one.    No   Not selected:    Yes, I have cancer now    Yes, but I'm in remission   Do any of these apply to you? Select all that apply.    I'm in close contact with a child in    Not selected:    I've been hospitalized within the last 5 days    I have diabetes    None of the above   The flu and COVID-19 can be more serious for people in certain groups. Do any of these apply to the people who live with you? Select all that apply.    Under age 5   Not selected:    Over age 65            Black     or     Pregnant    Has given birth, had a miscarriage, had a pregnancy loss, or had an  in the last 2 weeks    None of the above   Does any member of your household have any of these medical conditions? Select all that apply.    None of the above   Not selected:    Asthma    Disorders of the brain, spinal cord, or nerves and muscles, such as dementia, cerebral palsy, epilepsy, muscular dystrophy, or  developmental delay    Chronic lung disease, such as COPD or cystic fibrosis    Heart disease, such as congenital heart disease, congestive heart failure, or coronary artery disease    Cerebrovascular disease, such as stroke or another condition affecting the blood vessels or blood supply to the brain    Blood disorders, such as sickle cell disease    Diabetes    Metabolic disorders such as inherited metabolic disorders or mitochondrial disease    Kidney disorders    Liver disorders    Weakened immune system due to illness or medications such as chemotherapy or steroids    Children under the age of 19 who are on long-term aspirin therapy    Extreme obesity (BMI > 40)   Do you have any of these conditions? Scroll to see all options. Select all that apply.    None of the above   Not selected:    Aspirin triad (also known as Samter's triad or ASA triad)    Asthma or hives from taking aspirin or other NSAIDs, such as ibuprofen or naproxen    Blockage or narrowing of the blood vessels of the heart    Blood clotting disorder    Blood dyscrasia, such anemia, leukemia, lymphoma, or myeloma    Bone marrow depression    Catecholamine-releasing paraganglioma    Congenital long QT syndrome    Depression    Difficulty urinating or completely emptying your bladder    Uncorrected electrolyte abnormalities    Fungal infection    Gastrointestinal (GI) bleeding    Gastrointestinal (GI) obstruction    G6PD deficiency    Recent heart attack    High blood pressure    Irregular heartbeat or heart rhythm    Mononucleosis (mono)    Myasthenia gravis    Parkinson's disease    Pheochromocytoma    Reye syndrome    Seizure disorder    Thyroid disease    Ulcerative colitis   Have you ever had either of these conditions? Select all that apply.    No   Not selected:    Metoclopramide-associated dystonic reaction    Tardive dyskinesia   Just a few more questions about medications, and then you're finished. Have you used any non-prescription  medications or nasal sprays for your current symptoms? Examples include saline sprays, decongestants, NyQuil, and Tylenol. Select one.    Yes   Not selected:    No   Which of these non-prescription medications have you tried? Scroll to see all options. Select all that apply.    Acetaminophen (Tylenol)    Ibuprofen (Advil, Motrin, Midol)    Oxymetazoline (Afrin)   Not selected:    Budesonide (Rhinocort)    Cetirizine (Zyrtec)    Chlorpheniramine (Aller-chlor, Chlor-Trimeton)    Cromolyn (NasalCrom)    Dextromethorphan (Delsym, Robitussin, Vicks DayQuil Cough)    Diphenhydramine (Benadryl)    Fexofenadine (Allegra)    Fluticasone (Flonase)    Guaifenesin (Mucinex)    Guaifenesin/dextromethorphan (Delsym DM, Mucinex DM, Robitussin DM)    Ketotifen (Alaway, Zaditor)    Loratadine (Alavert, Claritin)    Naphazoline-pheniramine (Naphcon-A, Opcon-A, Visine-A)    Omeprazole (Prilosec)    Phenylephrine (Sudafed PE)    Triamcinolone (Nasacort)    None of the above   Have you taken any monoamine oxidase inhibitor (MAOI) medications in the last 14 days? Examples include rasagiline (Azilect), selegiline (Eldepryl, Zelapar), isocarboxazid (Marplan), phenelzine (Nardil), and tranylcypromine (Parnate). Select one.    No, not that I know of   Not selected:    Yes   Do you take Kynmobi or Apokyn (apomorphine)? Select one.    No   Not selected:    Yes   Are you still taking these medications listed in your medical record? If you're not taking any of these, click Next. Select all that apply.    NASAL SPRAY SINUS NA    norethindrone 0.35 MG tablet   Are you taking any other medications, vitamins, or supplements? Select one.    No   Not selected:    Yes   Have you ever had an allergic or bad reaction to any medication? Select one.    No   Not selected:    Yes   Are you allergic to milk or to the proteins found in milk (for example, whey or casein)? A milk allergy is different from lactose intolerance. Select one.    No, not that I know  of   Not selected:    Yes   Have you ever had jaundice or liver problems as a result of taking amoxicillin-clavulanate (Augmentin)? Jaundice is a condition in which the skin and the whites of the eyes turn yellow. Select all that apply.    No, not that I know of   Not selected:    Yes, jaundice    Yes, liver problems   Have you ever had jaundice or liver problems as a result of taking azithromycin (Zithromax, Zmax)? Jaundice is a condition in which the skin and the whites of the eyes turn yellow. Select all that apply.    No, not that I know of   Not selected:    Yes, jaundice    Yes, liver problems   Do you need a doctor's note? A doctor's note confirms that you received care today and states when you can return to school or work. It does not contain information about your diagnosis or treatment plan. Your provider will make the final decision on whether to give you a doctor's note and for how long. Doctor's notes CANNOT be backdated. We can't provide medical leave paperwork through this type of visit. If more paperwork is needed to request time off, contact your primary care provider. Select one.    No   Not selected:    Today only (1 day)    Today and tomorrow (2 days)    3 days    5 days    7 days   Is there anything you'd like to add about your symptoms? Please limit your comments to the symptoms asked about in this interview. If you include comments about other concerns, your provider may recommend that you be seen in person.    Lots of sneezing   ----------   Medical history   Medical history data does not currently exist for this patient.

## 2024-01-18 NOTE — EXTERNAL PATIENT INSTRUCTIONS
Note   treatment options including treating symptoms only and treating symptoms along with treatment with the antiviral, Paxlovid. There have been reports of rebound symptoms with Paxlovid. These are COVID symptoms that return several days after the last dose of Paxlovid   Diagnosis   COVID-19 infection   My name is ARIES Molina, and I'm a healthcare provider at [facility]. I've reviewed your interview. Based on your responses and recent positive COVID-19 test, I see that your symptoms are caused by a COVID-19 infection.   Most people with COVID-19 have mild to moderate symptoms and can rest at home until they get better.   People who've had COVID-19 should still get vaccinated to protect themselves. It's possible to be infected by the COVID-19 virus more than once.   People who've recently had COVID-19 should wait to get vaccinated until they've recovered and completed their isolation period.   For more information about how to get a COVID-19 vaccine, visit [facility]'s website. To find a COVID-19 vaccination site near you, visit www.vaccines.gov/  , call 1-879.687.1291  , or text your zip code to 546276 ([email or web address]). Message and data rates may apply.    Stay home   While you're recovering, STAY HOME for at least 5 full days. Don't leave your home except to get medical care.   While at home, avoid close contact with others.   If possible, stay in a room away from other people in your home, and use a separate bathroom.   Wear a well-fitting face mask when you can't avoid contact with other people.   If you can't wear a face mask because of breathing difficulty, your caregiver should wear a face mask.   Wearing a face mask does NOT mean you can leave your home. You must continue to stay home for at least 5 full days.   You can return to your normal activities when ALL of the following are true:    You've been fever-free for at least 24 hours (1 full day) without using fever-reducing medications  "such as Tylenol    Your symptoms have improved    It's been at least 5 full days since your symptoms first started   You should continue to wear a mask around others until it's been at least 10 days since your symptoms first started.   Prevention    Cover your mouth and nose with a tissue when you cough or sneeze. Throw used tissues in a lined trash can right away, and wash your hands immediately after.    Avoid sharing personal items like dishes, utensils, towels, or bedding. Wash items thoroughly with soap and water after use.    Wash your hands often with soap and water for at least 20 seconds. If soap and water are not available, clean your hands with a hand  that contains at least 60% alcohol. Cover all surfaces of your hands and rub them together until they feel dry.    Avoid touching your face, especially your eyes, nose, and mouth.    Clean \"high-touch\" surfaces daily. \"High-touch\" surfaces include counters, tabletops, doorknobs, bathroom fixtures, toilets, phones, keyboards, tablets, and bedside tables. You can use soap, detergents, 60%-80% ethanol or isopropyl alcohol,  such as Windex, or bleach. All of these  are effective at killing the virus that causes COVID-19.    Limit contact with pets and other animals while sick. If you must care for your pet, wash your hands before and after you interact with them and wear a face mask.   What to expect   Follow the advice in the treatment section below and you should feel better within 7 to 14 days. You may continue to feel tired and have a cough for several weeks.   Medications   Your pharmacy   [facility] [address] [place] [id number] [phone]     Prescription   Paxlovid is a treatment with 2 medications (nirmatrelvir 150mg/ritonavir 100mg): Take 2 tablets of nirmatrelvir and 1 tablet of ritonavir by mouth twice a day (in the morning and evening) for 5 days. Take all 3 tablets " "at the same time, with or without food. Do not cut, crush, or chew the tablets. Take exactly as directed. You must finish the entire course of medication, even if you feel better after the first few days of treatment.   Ondansetron (8mg): Using dry hands, place 1 tablet on the tongue and allow to dissolve every 8 hours as needed for nausea and/or vomiting.    I've given you a prescription for Paxlovid. Start taking your prescription as soon as possible. It works best when started within 5 days of getting sick.   If you miss a dose within 8 hours of the time it should be taken, take it as soon as possible and continue with your normal dosing schedule. If you miss a dose by more than 8 hours, don't take the missed dose and don't double the next dose to make up for the missed dose. Instead, take the normal dose at your next regularly scheduled time.   Paxlovid can interfere with the functioning of hormonal birth control. If you're using birth control containing estrogen, you should use a backup method while taking Paxlovid.   It's rare, but some people have \"rebound\" symptoms after finishing treatment with Paxlovid. This means they felt better and tested negative for COVID-19 after taking Paxlovid, but their symptoms returned, and they tested positive again several days later.   Usually, rebound symptoms are mild and people recover without needing additional treatment.   If you have rebound symptoms:    Re-isolate for at least 5 days. You can end your re-isolation after 5 days if you've been fever free for 24 hours (without the use of fever-reducing medications) and your symptoms are improving.    Wear a mask for another 10 days when you're around others.    You don't need to take another course of Paxlovid.   If your rebound symptoms are severe, persist, or worsen, call your healthcare provider right away.   For more information on Paxlovid, including side effects such as altered sense of taste, visit the [place] Food and " Drug Administration's (FDA) Paxlovid Fact Sheet for Patients, Parents, and Caregivers:   www.fda.gov/media/983974/download     About your diagnosis   Common symptoms of COVID-19 include fever, cough, shortness of breath, fatigue, muscle or body aches, headaches, new loss of sense of taste or smell, sore throat, stuffy or runny nose, nausea or vomiting, and diarrhea. Most people who get COVID-19 have mild symptoms and can rest at home until they get better. Elderly people and those with chronic medical problems may be at risk for more serious complications.   When to seek care   Call us at [phone]   with any sudden or unexpected symptoms.   Call your healthcare provider immediately if you have any of the following:    Fever over 103F    Fever that doesn't come down when you take Tylenol or ibuprofen    Fever that returns after being gone for more than 24 hours    Fever for more than 4 days    Worsening shortness of breath or difficulty breathing   Go to your nearest ER or call 911 if you have any of the following:    Shortness of breath that makes it difficult to do simple things like get dressed, bathe, or comb your hair    Persistent chest pain or chest tightness    New confusion or difficulty staying alert    Bluish color to the lips or face   Other treatment    Rest and drink plenty of sugar-free fluids.    Use a clean humidifier or a cool-mist vaporizer in your room at night. Breathing humid air may help with nasal congestion.    Try using a Neti Pot to flush out your stuffy nose and sinuses. Neti Pots are available at any drugstore without a prescription.    Avoid smoke and air pollution. Smoke can make infections worse.    Coronavirus (COVID-19) information   Common symptoms of COVID-19 include fever, cough, shortness of breath, fatigue, muscle or body aches, headaches, new loss of sense of taste or smell, sore throat, stuffy or runny nose, nausea or vomiting, and diarrhea. Most people who get  COVID-19 have mild symptoms and can rest at home until they get better. Elderly people and those with chronic medical problems may be at risk for more serious complications.   FAQs about the COVID-19 vaccine   Are the vaccines safe?   Yes. Hundreds of millions of people in the [place] have already safely received COVID-19 vaccines under the most intense safety monitoring in the history of the [place].   Do I need the vaccine if I've already had COVID?   Yes. Vaccination helps protect you even if you've already had COVID.   If you had COVID-19 and had symptoms, wait to get vaccinated until you've recovered and completed your isolation period.   If you tested positive for COVID-19 but did not have symptoms, you can get vaccinated after 5 full days have passed since you had a positive test, as long as you don't develop symptoms.   How many doses of the vaccine do I need?   Visit www.cdc.gov/coronavirus/2019-ncov/vaccines/stay-up-to-date.html   to find out how to stay up to date with your COVID-19 vaccines.   I'm immunocompromised. How many doses of the vaccine do I need?   For information on how immunocompromised people can stay up to date with their COVID-19 vaccines, visit www.cdc.gov/coronavirus/2019-ncov/vaccines/recommendations/immuno.html  .   What are the common side effects of the vaccine?   A sore arm, tiredness, headache, and muscle pain may occur within two days of getting the vaccine and last a day or two. For the Moderna or Pfizer vaccines, side effects are more common after the second dose. People over the age of 55 are less likely to have side effects than younger people.   After I'm up to date on vaccines, can I still get or spread COVID?   Yes, you can still get COVID, but your disease should be milder. And your risk of serious illness, hospitalization, and complications will be much lower, especially if you're up to date. Unfortunately, you can still spread COVID if you've been vaccinated. That's why it's  important to follow isolation guidelines if you get sick or test positive.   After I'm up to date on vaccines, can I go back to normal?   You should still wear a mask indoors in public if:    It's required by laws, rules, regulations, or local guidance.    You have a weakened immune system.    Your age puts you at increased risk of severe disease.    You have a medical condition that puts you at increased risk of severe disease.    Someone in your household has a weakened immune system, is at increased risk for severe disease, or is unvaccinated.    You're in an area of high transmission.   Where can I get a COVID-19 vaccine?   Visit [facility]'s website for more information. To find a COVID-19 vaccination site near you, visit www.[employer].gov/  , call [phone]  , or text your zip code to 324930 (PaperShare). Message and data rates may apply.   I've had close contact with someone who has COVID. Do I need to quarantine, and if so, for how long?   For the most current answer, including a calculator to determine whether you need to stay home and for how long, visit www.cdc.gov/coronavirus/2019-ncov/your-health/isolation.html  .   I've tested positive for COVID. How long do I need to isolate?   For the latest recommendations, including a calculator to determine how long you need to stay home, visit www.cdc.gov/coronavirus/2019-ncov/your-health/isolation.html  .   What if I develop symptoms that might be from COVID?   For the latest recommendations on what to do if you're sick, including when to seek emergency care, visit www.cdc.gov/coronavirus/2019-ncov/if-you-are-sick/index.html  .    Flu vaccine information   Who should get a flu vaccine?   Everyone 6 months of age and older should get a yearly flu vaccine.   When should I get vaccinated?   You should get a flu vaccine by the end of October. Once you're vaccinated, it takes about two weeks for antibodies to develop and protect you against the flu. That's why  it's important to get vaccinated as soon as possible.   After October, is it too late to get vaccinated?   No. You should still get vaccinated. As long as the flu viruses are still in your community, flu vaccines will remain available, even into January of next year or later.   Why do I need a flu vaccine EVERY year?   Flu viruses are constantly changing, so flu vaccines are usually updated from one season to the next. Your protection from the flu vaccine also lessens over time.   Is the flu vaccine safe?   Yes. Over the last 50 years, hundreds of millions of Americans have safely received the flu vaccines.   What are the side effects of flu vaccines?   You CANNOT get the flu from a flu vaccine. Common side effects of the flu shot include soreness, redness and/or swelling where the shot was given, low grade fever, and aches. Common side effects of the nasal spray flu vaccine for adults include runny nose, headaches, sore throat, and cough. For children, side effects include wheezing, vomiting, muscle aches, and fever.   Does the flu vaccine increase your risk of getting COVID-19?   No. There is no evidence that getting a flu vaccine increases your risk of getting COVID-19.   Is it safe to get the flu vaccine along with a COVID-19 vaccine?   Yes. It's safe to get the flu vaccine with a COVID-19 vaccine or booster.   Contact your healthcare provider TODAY for details on when and where to get your flu vaccine.   Your provider   Your diagnosis was provided by ARIES Molina, a member of your trusted care team at [facility].   If you have any questions, call us at [phone]  .

## 2024-01-19 ENCOUNTER — TELEPHONE (OUTPATIENT)
Dept: ENDOCRINOLOGY | Age: 34
End: 2024-01-19
Payer: COMMERCIAL

## 2024-01-19 NOTE — TELEPHONE ENCOUNTER
Called and spoke with pt regarding US Thyroid [[id number]] (Order [id number]) .     Pt said she did not an appt was scheduled for this order. Pt also expressed that she did not want to have this done anymore since the pain went away.

## 2024-01-22 ENCOUNTER — SPECIALTY PHARMACY (OUTPATIENT)
Dept: ENDOCRINOLOGY | Age: 34
End: 2024-01-22
Payer: COMMERCIAL

## 2024-01-26 ENCOUNTER — SPECIALTY PHARMACY (OUTPATIENT)
Dept: ENDOCRINOLOGY | Age: 34
End: 2024-01-26
Payer: COMMERCIAL

## 2024-01-26 NOTE — PROGRESS NOTES
Specialty Pharmacy Patient Management Program  Endocrinology Reassessment     Belem Mcknight was referred by an Endocrinology provider to the Endocrinology Patient Management program offered by [facility] for Weight Management. A follow-up outreach was conducted, including assessment of continued therapy appropriateness, medication adherence, and side effect incidence and management for Wegovy.    Changes to Insurance Coverage or Financial Support  Pt recently lost her job and unable to afford Wegovy.     Relevant Past Medical History and Comorbidities  Relevant medical history and concomitant health conditions were discussed with the patient. The patient's chart has been reviewed for relevant past medical history and comorbid health conditions and updated as necessary.   Past Medical History:   Diagnosis Date    Back pain     Bulging lumbar disc     last incident 2020    Gestational hypertension     Headache     Placenta previa 2021    first pregnancy    Tailbone injury     broken possibly twice 16 years old and 21 years old    Thyroid nodule 2021    enlarged- benign biopsy     Social History     Socioeconomic History    Marital status:    Tobacco Use    Smoking status: Never    Smokeless tobacco: Never   Vaping Use    Vaping Use: Never used   Substance and Sexual Activity    Alcohol use: No    Drug use: No    Sexual activity: Yes     Partners: Male     Birth control/protection: None     Comment: sig other = PREET     Problem list reviewed by Calli Roman, Safia on 1/26/2024 at 11:00 AM    Hospitalizations and Urgent Care Since Last Assessment  ED Visits, Admissions, or Hospitalizations: None  Urgent Office Visits: None    Allergies  Known allergies and reactions were discussed with the patient. The patient's chart has been reviewed for allergy information and updated as necessary.   No Known Allergies  Allergies reviewed by Calli Roman PharmD on 1/26/2024 at 11:07  AM    Relevant Laboratory Values  Relevant laboratory values were discussed with the patient. The following specialty medication dose adjustment(s) are recommended: No dosage adjustments recommended  A1C Last 3 Results          6/7/2023    16:09 12/14/2023    09:11   HGBA1C Last 3 Results   Hemoglobin A1C 5.30  5.50      Lab Results   Component Value Date    HGBA1C 5.50 12/14/2023     Lab Results   Component Value Date    GLUCOSE 88 12/14/2023    CALCIUM 9.6 12/14/2023     12/14/2023    K 4.2 12/14/2023    CO2 26.7 12/14/2023     12/14/2023    BUN 9 12/14/2023    CREATININE 0.62 12/14/2023    EGFRIFAFRI 130 02/27/2019    EGFRIFNONA 107 02/27/2019    BCR 14.5 12/14/2023    ANIONGAP 10.0 11/18/2022     Lab Results   Component Value Date    CHLPL 172 02/27/2019    TRIG 157 (H) 02/27/2019    HDL 38 (L) 02/27/2019     (H) 02/27/2019       Current Medication List  This medication list has been reviewed with the patient and evaluated for any interactions or necessary modifications/recommendations, and updated to include all prescription medications, OTC medications, and supplements the patient is currently taking.  This list reflects what is contained in the patient's profile, which has also been marked as reviewed to communicate to other providers it is the most up to date version of the patient's current medication therapy.     Current Outpatient Medications:     albuterol sulfate  (90 Base) MCG/ACT inhaler, Inhale 2 puffs every 6 hours as needed for wheezing. If symptoms are severe, may use as often as every 4 hours., Disp: 6.7 g, Rfl: 0    ibuprofen (ADVIL,MOTRIN) 800 MG tablet, , Disp: , Rfl:     Magnesium 400 MG capsule, Take  by mouth. (Patient not taking: Reported on 1/26/2024), Disp: , Rfl:     norethindrone (MICRONOR) 0.35 MG tablet, Take 1 tablet by mouth Daily., Disp: 84 tablet, Rfl: 3    ondansetron ODT (ZOFRAN-ODT) 8 MG disintegrating tablet, Using dry hands, place 1 tablet on the  tongue and allow to dissolve Every 8 (Eight) Hours As Needed for nausea and/or vomiting., Disp: 9 tablet, Rfl: 0    Oxymetazoline HCl (NASAL SPRAY SINUS NA), into the nostril(s) as directed by provider As Needed., Disp: , Rfl:     Prenatal-FeFum-FA-DHA w/o A (PRENATAL + DHA PO), Take  by mouth. (Patient not taking: Reported on 1/26/2024), Disp: , Rfl:     Semaglutide-Weight Management (Wegovy) 1 MG/0.5ML solution auto-injector, Inject 0.5 mL under the skin into the appropriate area as directed 1 (One) Time Per Week. (Patient not taking: Reported on 1/26/2024), Disp: 2 mL, Rfl: 3    Medicines reviewed by Calli Roman, PharmD on 1/26/2024 at 11:08 AM    Drug Interactions  No DDIs identified    Adverse Drug Reactions  Medication tolerability: Tolerating with no to minimal ADRs  Medication plan: Discontinued medication  Plan for ADR Management: N/A    Adherence, Self-Administration, and Current Therapy Problems  Adherence related to the patient's specialty therapy was discussed with the patient. The Adherence segment of this outreach has been reviewed and updated.     Adherence Questions  Linked Medication(s) Assessed:  (Wegovy)  On average, how many doses/injections does the patient miss per month?: 0  What are the identified reasons for non-adherence or missed doses? : financial  What is the estimated medication adherence level?: %  Based on the patient/caregiver response and refill history, does this patient require an MTP to track adherence improvements?: no    Additional Barriers to Patient Self-Administration: Insurance  Methods for Supporting Patient Self-Administration: N/A    Open Medication Therapy Problems  No medication therapy recommendations to display    Goals of Therapy  Goals related to the patient's specialty therapy were discussed with the patient. The Patient Goals segment of this outreach has been reviewed and updated.   Goals Addressed Today        Specialty Pharmacy General Goal       Lose 5% of body weight     12/14/23 = 321 lbs  06/07/23 = 332 lbs              Quality of Life Assessment   Quality of Life related to the patient's enrollment in the patient management program and services provided was discussed with the patient. The QOL segment of this outreach has been reviewed and updated.  Quality of Life Improvement Scale: 5-No change    Reassessment Plan & Follow-Up  1. Medication Therapy Changes: Pt has been on Wegovy 1mg weekly, but unfortunately due to employment changes, Wegovy is not currently financially viable for patient. We will continue to fill the other medications she gets from our pharmacy and will monitor for patient to be able to resume Wegovy.   2. Related Plans, Therapy Recommendations, or Issues to Be Addressed: None  3. Pharmacist to perform regular assessments no more than (6) months from the previous assessment.  4. Care Coordinator to set up future refill outreaches, coordinate prescription delivery, and escalate clinical questions to pharmacist.    Attestation  Therapeutic appropriateness: Appropriate   I attest the patient was actively involved in and has agreed to the above plan of care.  If the prescribed therapy is at any point deemed not appropriate based on the current or future assessments, a consultation will be initiated with the patient's specialty care provider to determine the best course of action. The revised plan of therapy will be documented along with any required assessments and/or additional patient education provided.     Calli Roman, Safia, BCPS, BCCCP  Clinical Specialty Pharmacist, Endocrinology  1/26/2024  11:17 EST

## 2024-02-05 ENCOUNTER — OFFICE VISIT (OUTPATIENT)
Dept: FAMILY MEDICINE CLINIC | Facility: CLINIC | Age: 34
End: 2024-02-05
Payer: COMMERCIAL

## 2024-02-05 VITALS
SYSTOLIC BLOOD PRESSURE: 114 MMHG | OXYGEN SATURATION: 95 % | RESPIRATION RATE: 18 BRPM | DIASTOLIC BLOOD PRESSURE: 82 MMHG | WEIGHT: 293 LBS | TEMPERATURE: 98.4 F | HEART RATE: 83 BPM | BODY MASS INDEX: 47.09 KG/M2 | HEIGHT: 66 IN

## 2024-02-05 DIAGNOSIS — G43.109 MIGRAINE WITH AURA AND WITHOUT STATUS MIGRAINOSUS, NOT INTRACTABLE: Primary | ICD-10-CM

## 2024-02-05 PROBLEM — J32.9 CHRONIC SINUSITIS: Status: ACTIVE | Noted: 2024-02-05

## 2024-02-05 PROCEDURE — 99203 OFFICE O/P NEW LOW 30 MIN: CPT | Performed by: INTERNAL MEDICINE

## 2024-02-05 RX ORDER — SUMATRIPTAN 50 MG/1
50 TABLET, FILM COATED ORAL ONCE AS NEEDED
Qty: 30 TABLET | Refills: 0 | Status: SHIPPED | OUTPATIENT
Start: 2024-02-05

## 2024-02-05 RX ORDER — PROPRANOLOL HYDROCHLORIDE 80 MG/1
80 TABLET ORAL DAILY
Qty: 30 TABLET | Refills: 2 | Status: SHIPPED | OUTPATIENT
Start: 2024-02-05

## 2024-02-05 NOTE — PROGRESS NOTES
Chief Complaint   Patient presents with    Establish Care    Migraine     Establishing care having migraines at least once a week sometimes 2, been happening since she was kid, having one right now           History of Present Illness:  Patient presented to the clinic today to establish care.  She reports migraine headaches that has gradually worsened. Used to have migraine headache once a month, now occurring 3-4 times monthly sometimes twice a week. She had h/o migraine headache in the past but was never diagnosed or treated. Recent symptoms started after her last pregnancy 1 year ago. Headache are unilateral(left sided) from the front to the back of the neck, pressure-like and throbbing in nature, associated with watery eyes, photophobia & phonophobia. Usually have aura symptoms like being flushed, yawning and fatigue couple of minutes before the migraine headache starts. The only that takes the edge off is goody's medication that contains acetaminophen, aspirin and caffeine.       Outpatient Medications Prior to Visit   Medication Sig Dispense Refill    albuterol sulfate  (90 Base) MCG/ACT inhaler Inhale 2 puffs every 6 hours as needed for wheezing. If symptoms are severe, may use as often as every 4 hours. 6.7 g 0    norethindrone (MICRONOR) 0.35 MG tablet Take 1 tablet by mouth Daily. 84 tablet 3    Oxymetazoline HCl (NASAL SPRAY SINUS NA) into the nostril(s) as directed by provider As Needed.      Semaglutide-Weight Management (Wegovy) 1 MG/0.5ML solution auto-injector Inject 0.5 mL under the skin into the appropriate area as directed 1 (One) Time Per Week. 2 mL 3    ibuprofen (ADVIL,MOTRIN) 800 MG tablet  (Patient not taking: Reported on 2/5/2024)      Magnesium 400 MG capsule Take  by mouth. (Patient not taking: Reported on 2/5/2024)      ondansetron ODT (ZOFRAN-ODT) 8 MG disintegrating tablet Using dry hands, place 1 tablet on the tongue and allow to dissolve Every 8 (Eight) Hours As Needed for  "nausea and/or vomiting. (Patient not taking: Reported on 2024) 9 tablet 0    Prenatal-FeFum-FA-DHA w/o A (PRENATAL + DHA PO) Take  by mouth. (Patient not taking: Reported on 2024)       No facility-administered medications prior to visit.      No Known Allergies  Past Surgical History:   Procedure Laterality Date     SECTION N/A 2021    Procedure:  SECTION PRIMARY;  Surgeon: Carol Cueva MD;  Location: [facility]OR DELIVERY;  Service: Obstetrics/Gynecology;  Laterality: N/A;     SECTION N/A 2023    Procedure:  SECTION REPEAT;  Surgeon: Joya Villa MD;  Location: [facility] DELIVERY;  Service: Obstetrics/Gynecology;  Laterality: N/A;     family history includes COPD in her mother; Cancer in her maternal grandmother and paternal grandfather; Colon cancer in her maternal grandmother; Depression in her mother; Diabetes in her paternal grandmother; Heart disease in her paternal aunt and paternal grandmother; Hypertension in her maternal grandfather; Melanoma in her mother; Neuropathy in her mother; OCD in her mother; Skin cancer in her father.   reports that she has never smoked. She has never been exposed to tobacco smoke. She has never used smokeless tobacco. She reports that she does not drink alcohol and does not use drugs.     /82 (BP Location: Right arm, Patient Position: Sitting, Cuff Size: Adult)   Pulse 83   Temp 98.4 °F (36.9 °C) (Oral)   Resp 18   Ht 167.6 cm (65.98\")   Wt (!) 143 kg (314 lb 4.8 oz)   LMP 2024 (Approximate)   SpO2 95%   BMI 50.75 kg/m²   Physical Exam  HENT:      Head: Normocephalic and atraumatic.   Cardiovascular:      Rate and Rhythm: Normal rate and regular rhythm.      Heart sounds: Normal heart sounds.   Pulmonary:      Breath sounds: Normal breath sounds.   Abdominal:      General: Bowel sounds are normal.      Palpations: Abdomen is soft.   Musculoskeletal:      Cervical back: Neck supple. No " tenderness.   Lymphadenopathy:      Cervical: No cervical adenopathy.   Skin:     General: Skin is warm and dry.   Neurological:      Mental Status: She is alert and oriented to person, place, and time.   Psychiatric:         Mood and Affect: Mood normal.                   Diagnoses and all orders for this visit:    1. Migraine with aura and without status migrainosus, not intractable (Primary)  -     SUMAtriptan (IMITREX) 50 MG tablet; Take 1 tablet by mouth 1 (One) Time As Needed for Migraine. Take one tablet at onset of headache. May repeat dose one time in 2 hours if headache not relieved.  Dispense: 30 tablet; Refill: 0  -     propranolol (INDERAL) 80 MG tablet; Take 1 tablet by mouth Daily.  Dispense: 30 tablet; Refill: 2             Return in about 3 months (around 5/5/2024) for Recheck, Annual physical with labs.

## 2024-02-06 ENCOUNTER — PATIENT ROUNDING (BHMG ONLY) (OUTPATIENT)
Dept: FAMILY MEDICINE CLINIC | Facility: CLINIC | Age: 34
End: 2024-02-06
Payer: COMMERCIAL

## 2024-02-06 ENCOUNTER — PATIENT MESSAGE (OUTPATIENT)
Dept: FAMILY MEDICINE CLINIC | Facility: CLINIC | Age: 34
End: 2024-02-06
Payer: COMMERCIAL

## 2024-02-08 ENCOUNTER — TELEPHONE (OUTPATIENT)
Dept: ENDOCRINOLOGY | Age: 34
End: 2024-02-08
Payer: COMMERCIAL

## 2024-02-08 NOTE — TELEPHONE ENCOUNTER
PT CALLING BACK TO GIVE FAX NUMBER FOR PLACE TO SEND LAB ORDERS [phone]. PLEASE SEND ORDERS TO THIS OFFICE.   OFFICE IS LOCATED AT [address] [facility] [address]   PLEASE RICARDO PT TO SCHEDULED ONCE ORDERS ARE PLACED  [phone] (home)

## 2024-02-15 ENCOUNTER — TELEPHONE (OUTPATIENT)
Dept: ENDOCRINOLOGY | Age: 34
End: 2024-02-15
Payer: COMMERCIAL

## 2024-02-21 ENCOUNTER — SPECIALTY PHARMACY (OUTPATIENT)
Dept: ENDOCRINOLOGY | Age: 34
End: 2024-02-21
Payer: COMMERCIAL

## 2024-02-21 NOTE — PROGRESS NOTES
Specialty Pharmacy Refill Coordination Note     Belem is a 33 y.o. female contacted today regarding refills of  1 specialty medication(s).    Reviewed and verified with patient:       Specialty medication(s) and dose(s) confirmed: yes    Refill Questions      Flowsheet Row Most Recent Value   Changes to allergies? No   Changes to medications? No   New conditions or infections since last clinic visit No   Unplanned office visit, urgent care, ED, or hospital admission in the last 4 weeks  No   How does patient/caregiver feel medication is working? Good   Financial problems or insurance changes  No   Since the previous refill, were any specialty medication doses or scheduled injections missed or delayed?  No   Does this patient require a clinical escalation to a pharmacist? No            Delivery Questions      Flowsheet Row Most Recent Value   Delivery method [employer]   Delivery address verified with patient/caregiver? Yes   Delivery address Home   Number of medications in delivery 1   Medication(s) being filled and delivered Norethindrone (Progestin Contraceptives - Oral)   Doses left of specialty medications 1 week   Copay verified? Yes   Copay amount $0   Copay form of payment No copayment ($0)                   Follow-up: 25 day(s)     Cee Laurent, Pharmacy Technician  Specialty Pharmacy Technician

## 2024-03-05 DIAGNOSIS — G43.109 MIGRAINE WITH AURA AND WITHOUT STATUS MIGRAINOSUS, NOT INTRACTABLE: Primary | ICD-10-CM

## 2024-03-05 DIAGNOSIS — E66.01 CLASS 3 SEVERE OBESITY DUE TO EXCESS CALORIES WITHOUT SERIOUS COMORBIDITY WITH BODY MASS INDEX (BMI) OF 50.0 TO 59.9 IN ADULT: Primary | ICD-10-CM

## 2024-03-05 RX ORDER — TOPIRAMATE 50 MG/1
50 TABLET, FILM COATED ORAL 2 TIMES DAILY
Qty: 60 TABLET | Refills: 2 | Status: SHIPPED | OUTPATIENT
Start: 2024-03-05

## 2024-03-05 RX ORDER — SEMAGLUTIDE 0.5 MG/.5ML
0.5 INJECTION, SOLUTION SUBCUTANEOUS WEEKLY
Qty: 2 ML | Refills: 3 | Status: SHIPPED | OUTPATIENT
Start: 2024-03-05

## 2024-03-19 ENCOUNTER — TELEPHONE (OUTPATIENT)
Dept: OBSTETRICS AND GYNECOLOGY | Facility: CLINIC | Age: 34
End: 2024-03-19
Payer: COMMERCIAL

## 2024-03-19 ENCOUNTER — SPECIALTY PHARMACY (OUTPATIENT)
Dept: ENDOCRINOLOGY | Age: 34
End: 2024-03-19
Payer: COMMERCIAL

## 2024-03-19 RX ORDER — ACETAMINOPHEN AND CODEINE PHOSPHATE 120; 12 MG/5ML; MG/5ML
1 SOLUTION ORAL DAILY
Qty: 84 TABLET | Refills: 1 | Status: SHIPPED | OUTPATIENT
Start: 2024-03-19 | End: 2025-03-19

## 2024-03-19 NOTE — TELEPHONE ENCOUNTER
Pt sent in a [employer] message stating she would like a refill of her birth control sent to her pharmacy please. Pt aware of time for AE exam also.     Pharmacy- [facility] - [place]     Please advise, thank you

## 2024-03-19 NOTE — PROGRESS NOTES
Specialty Pharmacy Refill Coordination Note     Belem is a 33 y.o. female contacted today regarding refills of  1 specialty medication(s).    Reviewed and verified with patient:       Specialty medication(s) and dose(s) confirmed: yes    Refill Questions      Flowsheet Row Most Recent Value   Changes to allergies? No   Changes to medications? No   New conditions or infections since last clinic visit No   Unplanned office visit, urgent care, ED, or hospital admission in the last 4 weeks  No   How does patient/caregiver feel medication is working? Good   Financial problems or insurance changes  No   Since the previous refill, were any specialty medication doses or scheduled injections missed or delayed?  No   Does this patient require a clinical escalation to a pharmacist? No            Delivery Questions      Flowsheet Row Most Recent Value   Delivery method [employer]   Delivery address verified with patient/caregiver? Yes   Delivery address Home   Number of medications in delivery 1   Medication(s) being filled and delivered Norethindrone (Progestin Contraceptives - Oral)   Copay verified? Yes   Copay amount 0   Copay form of payment No copayment ($0)                   Follow-up: 25 day(s)     Cee Laurent, Pharmacy Technician  Specialty Pharmacy Technician

## 2024-03-25 ENCOUNTER — TELEPHONE (OUTPATIENT)
Dept: FAMILY MEDICINE CLINIC | Facility: CLINIC | Age: 34
End: 2024-03-25
Payer: COMMERCIAL

## 2024-03-25 DIAGNOSIS — G43.109 MIGRAINE WITH AURA AND WITHOUT STATUS MIGRAINOSUS, NOT INTRACTABLE: Primary | ICD-10-CM

## 2024-03-25 NOTE — TELEPHONE ENCOUNTER
Caller: Belem Mcknight    Relationship: Self    Best call back number: [phone]    What medication are you requesting: PROPANOLOL    What are your current symptoms: MIGRAINES    How long have you been experiencing symptoms: ONGOING ISSUE    Have you had these symptoms before:    [x] Yes  [] No    Have you been treated for these symptoms before:   [x] Yes  [] No    If a prescription is needed, what is your preferred pharmacy and phone number: [facility]     Additional notes:PATIENT WAS TAKING PROPANOLOL BUT ASKED TO BE SWITCHED TO ANOTHER MEDICATION WHICH SHE IS NOW HAVING BAD SIDE EFFECTS FROM.SHE WAS HAVING REALLY BAD MUSCLE SPASMS, FACE FEELS TINGLY/ASLEEP,SPASMS IN FACE,ARMS CHEST AND BACK. PATIENT IS REQUESTING IF SHE CAN BE SWITCHED BACK TO THE PROPANOLOL. PLEASE ADVISE.

## 2024-03-26 RX ORDER — PROPRANOLOL HYDROCHLORIDE 80 MG/1
80 TABLET ORAL DAILY
Qty: 30 TABLET | Refills: 2 | Status: SHIPPED | OUTPATIENT
Start: 2024-03-26

## 2024-03-30 ENCOUNTER — E-VISIT (OUTPATIENT)
Dept: FAMILY MEDICINE CLINIC | Facility: TELEHEALTH | Age: 34
End: 2024-03-30
Payer: COMMERCIAL

## 2024-03-30 NOTE — E-VISIT TREATED
Chief Complaint: Cold, flu, COVID, sinus, hay fever, or seasonal allergies   Patient introduction   Patient is 33-year-old female with congestion, new loss of smell or taste, sore throat, headache, and chills that started 3 to 5 days ago.   COVID-19 testing history, vaccination status, and exposure:    Has not been tested for COVID-19 since symptom onset.    Patient was prompted to take a self-test during the interview, but does not have a COVID-19 test kit.    Has not received an updated [id number] COVID-19 vaccination (Pfizer-BioNTech or Moderna vaccine after September 12, 2023; or Novavax vaccine after October 3, 2023).    No known exposure to a person with a confirmed or suspected case of COVID-19.    No high-risk (household) exposure to COVID-19 within the last 14 days.   Risk factors for severe disease from COVID-19 infection    BMI >= 40.    Mostly sedentary lifestyle.   General presentation   Symptoms came on gradually.   Fever:    No fever.   Sinus and nasal symptoms:    Green nasal drainage.    Nasal drainage is thick.    Postnasal drip.    Sinus pain or pressure on or around the forehead, eyes, nose, cheeks, and upper teeth or jaw.    Patient first noticed sinus pain less than 5 days ago.    Sinus pain is worse with Valsalva.    No nasal discharge.    No itchy nose or sneezing.    No history of unhealed nasal septal ulcer/nasal wound.    No history of antibiotic treatment for sinus infection in the last year.    No history of deviated septum or nasal polyps.   Throat symptoms:    Sore throat. Pain is bilateral but worse on the left side. Has redness in the back of the throat and on the tonsils.    Has discomfort when swallowing but can swallow liquids and solid foods.   Head and body aches:    Headache described as moderate (4 to 6 on a scale of 1 to 10).    Chills.    No sweats.    No myalgia.    No fatigue.   Cough:    No cough.   Wheezing and shortness of breath:    No COPD diagnosis.    No asthma  "diagnosis.    No wheezing.    No shortness of breath.   Chest pain:    No chest pain.   Ear symptoms:    Current symptoms include pain, pressure, fullness, crackling or popping, and a plugged or blocked sensation in the ear(s).   Dizziness:    No dizziness.   Allergies:    Patient has known seasonal allergies and known dust allergies.    Patient does not think symptoms are allergy-related.   Flu exposure:    No recent known exposure to a person with a confirmed flu diagnosis.    Received a flu vaccine in the last 3 to 6 months.   Patient is taking over-the-counter medications for current symptoms, including acetaminophen, fluticasone, oxymetazoline, and phenylephrine.   Review of red flags/alarm symptoms:    No changes in alertness or awareness.    No symptoms suggesting airway obstruction.    No symptoms suggesting intracranial hemorrhage.    No decreased urination.    No blue or gray coloring present in face, lips, or nail beds.    No swelling, pain, redness, or increased warmth in the calf or lower part of ONE leg only.    No proptosis.   Risk factors for antibiotic resistance:    Close contact with a child in .   Pregnancy/menstrual status/breastfeeding:    Not pregnant.    Not breastfeeding.    Regarding date of last menstrual period, patient writes: 3/25/2024.   Self-exam:    Height: 5' 6\"    Weight: 315 lbs    Tonsillar edema.    Purulent tonsils.    No palatal petechiae.    Swollen/painful neck lymph nodes.   Recent antibiotic use:    Has not taken antibiotics for similar symptoms within the past month.   Current medications   Currently taking propranolol 80 MG tablet, NASAL SPRAY SINUS NA, and norethindrone 0.35 MG tablet.   Medication allergies   None.   Medication contraindication review   No history of anaphylactic reaction to beta-lactam antibiotics; aspirin triad; blood dyscrasia; bone marrow depression; catecholamine-releasing paraganglioma; coronary artery disease; coagulation disorder; " congenital long QT syndrome; depression; electrolyte abnormalities; fungal infection; GI bleeding; GI obstruction; G6PD deficiency; heart arrhythmia; hypertension; mononucleosis; myasthenia; recent myocardial infarction; NSAID-induced asthma/urticaria; Parkinson's disease; pheochromocytoma; porphyria; Reye syndrome; seizure disorder; ulcerative colitis; and urinary retention.   No history of metoclopramide-associated dystonic reaction and tardive dyskinesia.   No known history of amoxicillin-clavulanate-associated cholestatic jaundice or hepatic impairment.   No known history of azithromycin-associated cholestatic jaundice or hepatic impairment.   Past medical history   Immune conditions:   No immunocompromising conditions.   No history of cancer.   Social history   Never smoked tobacco.   Patient-submitted comments   Patient was asked if they had anything to add about their symptoms. Patient writes: I have ehat look like blisters in yhr back of my throat and on my tonsils and the front half of my tongue is raw like it's been burned but it hasn't. It's felt like this for 5+ days.   Patient did not request an excuse note.   Assessment   Strep pharyngitis.   This is the likely diagnosis based on patient's interview responses, including:    Symptom profile   Plan   Medications:    azithromycin 500 mg tablet RX 500mg 1 tab PO qd 5d for infection. This medication is an antibiotic. Take it exactly as directed. You must finish the entire course of medication, even if you feel better after the first few days of treatment. Amount is 5 tab.   The patient's prescription will be sent to:   Your [facility]   [address] [place] [id number]   Phone: [phone]     Fax: [phone]   Education:    Condition and causes    Prevention    Treatment and self-care    When to call provider   ----------   Electronically signed by ARIES Youngblood on 2024-03-30 at 08:11AM   ----------   Patient Interview  "Transcript:   Which of these symptoms are bothering you? Select all that apply.    Stuffed-up nose or sinuses    Loss of smell or taste    Sore throat    Headache    Chills   Not selected:    Cough    Shortness of breath    Runny nose    Itchy or watery eyes    Itchy nose or sneezing    Hoarse voice or loss of voice    Fever    Sweats    Muscle or body aches    Fatigue or tiredness    Nausea or vomiting    Diarrhea    I don't have any of these symptoms   When did your current symptoms start? Select one.    3 to 5 days ago   Not selected:    Less than 48 hours ago    6 to 9 days ago    10 to 14 days ago    2 to 3 weeks ago    3 to 4 weeks ago    More than a month ago   Do you know the exact date your symptoms started? If so, enter the date as MM/DD/YY. Select one.    No   Not selected:    Yes (specify)   Did your symptoms come on suddenly or gradually? Select one.    Gradually   Not selected:    Suddenly    I'm not sure   Since your current symptoms started, have you been tested for COVID-19? This includes home self-tests as well as nose swab or saliva tests done at a doctor's office, lab, or testing site. Select one.    No   Not selected:    Yes   Taking a home COVID test can help your provider give you the best care. - If you have a COVID test kit, take the test now before continuing this interview. - If you choose not to take a test or don't have one, you should still continue this interview. Your provider can still help you get care. Do you have a COVID test kit? Select one.    No, I don't have a test kit   Not selected:    Yes, and I'll take a test now    Yes, but I prefer not to take a test now   Has anyone in your household tested positive for COVID-19 in the past 14 days? Select one.    No   Not selected:    Yes   In the last 14 days, have you had close contact with someone who has COVID-19? \"Close contact\" means any of these: - Caring for someone with COVID-19. - Being within 6 feet of someone with COVID-19 " for a total of at least 15 minutes over a 24-hour period. For example, three 5-minute exposures for a total of 15 minutes. - Being in direct contact with respiratory droplets from someone with COVID-19 (being coughed on, kissing, sharing utensils). Select one.    No, not that I know of   Not selected:    Yes, a confirmed case    Yes, a suspected case   Have you gotten the [id number] updated COVID-19 vaccine? This means either the updated Pfizer-BioNTech or Moderna vaccine after September 12, 2023; or the updated Novavax vaccine after October 3, 2023. Select one.    No   Not selected:    Yes   Since your symptoms started, have you felt dizzy? Select one.    No   Not selected:    Yes, but I can still do my regular daily activities    Yes, and it makes it hard to stand, walk, or do daily activities   Do you have chest pain? You might also feel it as discomfort, aching, tightness, or squeezing in the chest. Select one.    No   Not selected:    Yes   You mentioned having a headache. On a scale of 1 to 10, how severe is your headache pain? Select one.    Moderate (4 to 6)   Not selected:    Mild (1 to 3)    Severe (7 to 9)    Unbearable (10)    The worst headache of my life (10+)   Do you feel sinus pain or pressure in any of these areas?    In my forehead    Around my eyes    Behind my nose    In my cheeks    In my upper teeth or jaw   Not selected:    No   When did you first notice your sinus pain or pressure? Select one.    Less than 5 days ago   Not selected:    5 to 9 days ago    10 to 14 days ago    2 to 4 weeks ago    1 month ago or longer   Does coughing, sneezing, or leaning forward make your sinuses feel worse? Select one.    Yes   Not selected:    No   What color is your nasal drainage? Select one.    Green or greenish   Not selected:    Clear    White    Yellow or yellowish    My nose is stuffed but not draining or running   Is your nasal drainage thick or thin? Select one.    Thick   Not selected:    Thin   Is  "there any drainage (mucus) going down the back of your throat? This kind of drainage is also called \"postnasal drip.\" It can cause frequent throat clearing. Select one.    Yes   Not selected:    No, not that I know of   Can you swallow liquids and solid foods? A sore throat may be painful when swallowing, but it shouldn't prevent you from swallowing. Select one.    Yes, but it's uncomfortable   Not selected:    Yes, with ease    Yes, but it's painful    It's hard to swallow anything because it feels like liquids and food get stuck in my throat    No, I can't swallow anything, liquid or solid foods   Is your throat pain worse on one side than the other? Select one.    Yes, it's worse on the left side   Not selected:    Yes, it's worse on the right side    No, it's the same on both sides   Which of these best describes your throat pain? Select one.    Pain on both sides, but worse on the left   Not selected:    Severe pain on the left, and little to no pain on the right   To recommend the best treatment, we need to see photos of your throat. You can have someone else take the photo, or use a mirror. If you choose not to send photos, you can still continue this interview, but your treatment options may be affected. Select one.    I'll take the photos myself   Not selected:    Someone can take the photos for me    I'd rather not send photos   Send at least 2 photos for review. - Switch the flash to On (not auto). - Stand close to a mirror. - Don't use the \"selfie\" camera. Instead, turn the phone around and tilt it slightly up. - Looking in the mirror, tap to focus on the back of the throat, not the teeth. - Say \"Ah\" so the back of your throat is visible, and take the photo. - Before sending, make sure the photos are clear and the throat is in focus.    Upload 1    Upload 2   Not selected:    Upload 3   Have you urinated at least 3 times in the last 24 hours? Select one.    Yes   Not selected:    No   Do your face, lips, " or nail beds appear blue or gray? Select one.    No   Not selected:    Yes   Do you have any swelling, pain, redness, or increased warmth in the calf or lower part of ONE leg only? Select one.    No   Not selected:    Yes   Changes in alertness or awareness may mean you need emergency care. Since your symptoms started, have you had any of these? Select all that apply.    None of the above   Not selected:    Confusion    Slurred speech    Not knowing where you are or what day it is    Difficulty staying conscious    Fainting or passing out   Do your symptoms include a whistling sound, or wheezing, when you breathe? Select one.    No   Not selected:    Yes   Since your symptoms started, have you noticed that one or both of your eyes is bulging or poking out? Select one.    No   Not selected:    Yes   Do you have any of these symptoms in your ear(s)? Select all that apply.    Pain    Pressure    Fullness    Crackling or popping    Plugged or blocked sensation   Not selected:    None of the above   Are your tonsils larger than usual?    Yes   Not selected:    No, not that I can tell    I've had my tonsils removed   Is there redness in the back of your throat or on your tonsils? Select all that apply.    Yes, in the back of the throat    Yes, on the tonsils   Not selected:    No, not that I can see   Is there any white or yellow pus on your tonsils?    Yes   Not selected:    No, not that I can see   Are there red spots on the roof of your mouth or the back of your throat?    No, not that I can see   Not selected:    Yes   Are your glands/lymph nodes swollen, or does it hurt when you touch them?    Yes   Not selected:    No, not that I can tell   In the past week, has anyone around you (such as at school, work, or home) had a confirmed diagnosis of the flu? A confirmed diagnosis means that a nose swab was done to verify a flu infection. Select all that apply.    No, not that I know of   Not selected:    I live with  someone who has the flu    I've been within touching distance of someone who has the flu    I've walked by, or sat about 3 feet away from, someone who has the flu    I've been in the same building as someone who has the flu   Have you ever been diagnosed with asthma? Select one.    No   Not selected:    Yes   Have you ever been diagnosed with chronic obstructive pulmonary disease (COPD)? Select one.    No, not that I know of   Not selected:    Yes   In the past month, have you taken antibiotics for similar symptoms? Examples of antibiotics include amoxicillin, amoxicillin-clavulanate (Augmentin), penicillin, cefdinir (Omnicef), doxycycline, and clindamycin (Cleocin). Select one.    No   Not selected:    Yes (specify)   In the last year, how many times were you treated with antibiotics for a sinus infection? Select one.    None   Not selected:    1 to 3 times    4 or more times   Do any of these apply to you? Select all that apply.    I'm in close contact with a child in    Not selected:    I've been hospitalized within the last 5 days    I have diabetes    None of the above   Have you been diagnosed with a deviated septum or nasal polyps? The nose is divided into two nostrils by the septum. A crooked septum is called a deviated septum. Nasal polyps are growths inside the nose or sinuses. Select one.    No, not that I know of   Not selected:    Yes, but I had surgery to treat them    Yes, I have a deviated septum    Yes, I have nasal polyps    Yes, I have a deviated septum and nasal polyps   Do you have a sore inside your nose that won't heal? Select one.    No, not that I know of   Not selected:    Yes   Do you have allergies (pollen, dust mites, mold, animal dander)? Select one.    Yes   Not selected:    No, not that I know of   What kind of allergies do you have? Select all that apply.    Seasonal allergies (hay fever)    Dust allergies   Not selected:    Perennial, or year-round, allergies (hay fever)     Pet allergies    None of the above    I'm not sure   Do you think your symptoms could be allergy-related? Select one.    No   Not selected:    Yes    I'm not sure   Have you had a flu shot this season? Select one.    Yes, 3 to 6 months ago   Not selected:    Yes, less than 2 weeks ago    Yes, 2 to 4 weeks ago    Yes, 1 to 3 months ago    Yes, more than 6 months ago    No   Are you pregnant? Select one.    No   Not selected:    Yes   When was your last menstrual period? If you don't currently have periods or no longer have periods, please briefly explain.    3/25/2024   Within the last 2 weeks, have you: - Given birth - Had a miscarriage - Had a pregnancy loss - Had an  Being postpartum (live birth or loss) within the last 2 weeks increases your risk of flu complications. Select one.    No   Not selected:    Yes   Are you breastfeeding? Select one.    No   Not selected:    Yes   The flu and COVID-19 can be more serious for people in certain groups. The next few questions help us figure out if you or anyone you live with is at higher risk for complications from these infections. Do any of these statements apply to you? Select all that apply.    None of the above   Not selected:    I'm     I'm     I'm Black    I'm  or    Do you smoke tobacco? Select one.    No   Not selected:    Yes, every day    Yes, some days    No, I quit   Do you have a mostly inactive lifestyle? Answer yes if all of these are true: - You spend at least 6 hours a day sitting or lying down - You get less than 2 and a half hours per week of moderate exercise such as walking fast - You get less than 1 hour and 15 minutes per week of intense exercise such as jogging or running Select one.    Yes   Not selected:    No   Do you have any of these conditions? Select all that apply.    None of the above   Not selected:    Chronic lung disease, such as cystic fibrosis or interstitial fibrosis    Heart  disease, such as congenital heart disease, congestive heart failure, or coronary artery disease    Disorder of the brain, spinal cord, or nerves and muscles, such as dementia, cerebral palsy, epilepsy, muscular dystrophy, or developmental delay    Metabolic disorder or mitochondrial disease    Cerebrovascular disease, such as stroke or another condition affecting the blood vessels or blood supply to the brain    Down syndrome    Mood disorder, including depression or schizophrenia spectrum disorders    Substance use disorder, such as alcohol, opioid, or cocaine use disorder    Tuberculosis    Primary immunodeficiency   Do you live in a group care setting? Examples include: - Nursing home - Residential care - Psychiatric treatment facility - Group home - [facility] - [facility] and care home - Homeless shelter - Foster care setting Select one.    No   Not selected:    Yes   Are you a healthcare worker? Select one.    No   Not selected:    Yes   People with a very high body mass index (BMI) are at higher risk for developing complications from the flu and severe illness from COVID-19. To determine your BMI, we need to know your weight and height. Please enter your weight (in pounds).    Weight   Please enter your height.    Height   Do you have any of these conditions that can affect the immune system? Scroll to see all options. Select all that apply.    None of these   Not selected:    History of bone marrow transplant    Chronic kidney disease    Chronic liver disease (including cirrhosis)    HIV/AIDS    Inflammatory bowel disease (Crohn's disease or ulcerative colitis)    Lupus    Moderate to severe plaque psoriasis    Multiple sclerosis    Rheumatoid arthritis    Sickle cell anemia    Alpha or beta thalassemia    History of kidney, liver, heart, or other solid organ transplant    History of liver, heart, or other solid organ transplant    History of spleen removal    An autoimmune disorder not listed here (specify)    A  condition requiring treatment with long-term use of oral steroids (such as prednisone, prednisolone, or dexamethasone) (specify)   Have you ever been diagnosed with cancer? Select one.    No   Not selected:    Yes, I have cancer now    Yes, but I'm in remission   The flu and COVID-19 can be more serious for people in certain groups. Do any of these apply to the people who live with you? Select all that apply.    None of the above   Not selected:    Under age 5    Over age 65            Black     or     Pregnant    Has given birth, had a miscarriage, had a pregnancy loss, or had an  in the last 2 weeks   Does any member of your household have any of these medical conditions? Select all that apply.    None of the above   Not selected:    Asthma    Disorders of the brain, spinal cord, or nerves and muscles, such as dementia, cerebral palsy, epilepsy, muscular dystrophy, or developmental delay    Chronic lung disease, such as COPD or cystic fibrosis    Heart disease, such as congenital heart disease, congestive heart failure, or coronary artery disease    Cerebrovascular disease, such as stroke or another condition affecting the blood vessels or blood supply to the brain    Blood disorders, such as sickle cell disease    Diabetes    Metabolic disorders such as inherited metabolic disorders or mitochondrial disease    Kidney disorders    Liver disorders    Weakened immune system due to illness or medications such as chemotherapy or steroids    Children under the age of 19 who are on long-term aspirin therapy    Extreme obesity (BMI > 40)   Do you have any of these conditions? Scroll to see all options. Select all that apply.    None of the above   Not selected:    Aspirin triad (also known as Samter's triad or ASA triad)    Asthma or hives from taking aspirin or other NSAIDs, such as ibuprofen or naproxen    Blockage or narrowing of the blood vessels of the heart    Blood  clotting disorder    Blood dyscrasia, such anemia, leukemia, lymphoma, or myeloma    Bone marrow depression    Catecholamine-releasing paraganglioma    Congenital long QT syndrome    Depression    Difficulty urinating or completely emptying your bladder    Uncorrected electrolyte abnormalities    Fungal infection    Gastrointestinal (GI) bleeding    Gastrointestinal (GI) obstruction    G6PD deficiency    Recent heart attack    High blood pressure    Irregular heartbeat or heart rhythm    Mononucleosis (mono)    Myasthenia gravis    Parkinson's disease    Pheochromocytoma    Reye syndrome    Seizure disorder    Thyroid disease    Ulcerative colitis   Have you ever had either of these conditions? Select all that apply.    No   Not selected:    Metoclopramide-associated dystonic reaction    Tardive dyskinesia   Just a few more questions about medications, and then you're finished. Have you used any non-prescription medications or nasal sprays for your current symptoms? Examples include saline sprays, decongestants, NyQuil, and Tylenol. Select one.    Yes   Not selected:    No   Which of these non-prescription medications have you tried? Scroll to see all options. Select all that apply.    Acetaminophen (Tylenol)    Fluticasone (Flonase)    Oxymetazoline (Afrin)    Phenylephrine (Sudafed PE)   Not selected:    Budesonide (Rhinocort)    Cetirizine (Zyrtec)    Chlorpheniramine (Aller-chlor, Chlor-Trimeton)    Cromolyn (NasalCrom)    Dextromethorphan (Delsym, Robitussin, Vicks DayQuil Cough)    Diphenhydramine (Benadryl)    Fexofenadine (Allegra)    Guaifenesin (Mucinex)    Guaifenesin/dextromethorphan (Delsym DM, Mucinex DM, Robitussin DM)    Ibuprofen (Advil, Motrin, Midol)    Ketotifen (Alaway, Zaditor)    Loratadine (Alavert, Claritin)    Naphazoline-pheniramine (Naphcon-A, Opcon-A, Visine-A)    Omeprazole (Prilosec)    Triamcinolone (Nasacort)    None of the above   Have you taken any monoamine oxidase inhibitor  (MAOI) medications in the last 14 days? Examples include rasagiline (Azilect), selegiline (Eldepryl, Zelapar), isocarboxazid (Marplan), phenelzine (Nardil), and tranylcypromine (Parnate). Select one.    No, not that I know of   Not selected:    Yes   Do you take Kynmobi or Apokyn (apomorphine)? Select one.    No   Not selected:    Yes   Are you still taking these medications listed in your medical record? If you're not taking any of these, click Next. Select all that apply.    propranolol 80 MG tablet    NASAL SPRAY SINUS NA    norethindrone 0.35 MG tablet   Are you taking any other medications, vitamins, or supplements? Select one.    No   Not selected:    Yes   Have you ever had an allergic or bad reaction to any medication? Select one.    No   Not selected:    Yes   Are you allergic to milk or to the proteins found in milk (for example, whey or casein)? A milk allergy is different from lactose intolerance. Select one.    No, not that I know of   Not selected:    Yes   Have you ever had jaundice or liver problems as a result of taking amoxicillin-clavulanate (Augmentin)? Jaundice is a condition in which the skin and the whites of the eyes turn yellow. Select all that apply.    No, not that I know of   Not selected:    Yes, jaundice    Yes, liver problems   Have you ever had jaundice or liver problems as a result of taking azithromycin (Zithromax, Zmax)? Jaundice is a condition in which the skin and the whites of the eyes turn yellow. Select all that apply.    No, not that I know of   Not selected:    Yes, jaundice    Yes, liver problems   Do you need a doctor's note? A doctor's note confirms that you received care today and states when you can return to school or work. It does not contain information about your diagnosis or treatment plan. Your provider will make the final decision on whether to give you a doctor's note and for how long. Doctor's notes CANNOT be backdated. We can't provide medical leave paperwork  through this type of visit. If more paperwork is needed to request time off, contact your primary care provider. Select one.    No   Not selected:    Today only (1 day)    Today and tomorrow (2 days)    3 days    5 days    7 days   Is there anything you'd like to add about your symptoms? Please limit your comments to the symptoms covered in this interview. If you include comments about other concerns, your provider may recommend that you be seen in person.    I have ehat look like blisters in yhr back of my throat and on my tonsils and the front half of my tongue is raw like it's been burned but it hasn't. It's felt like this for 5+ days   ----------   Medical history   Medical history data does not currently exist for this patient.

## 2024-03-30 NOTE — EXTERNAL PATIENT INSTRUCTIONS
Note   I am going to treat you for strep throat, but I highly recommend testing for COVID-19 to rule out this infection.   Diagnosis   Strep pharyngitis (strep throat)   My name is ARIES Youngblood, and I'm a healthcare provider at [facility]. I reviewed your interview, and I see that you have strep throat. This can be a painful condition, but it responds well to treatment.   To prevent the spread of illness to others, stay home and away from other people as much as possible while you're sick. When you need to be around other people, consider wearing a face mask.   Medications   Your pharmacy   [facility] [address]. [facility] [id number] [phone]     Prescription   Azithromycin (500mg): Take 1 tablet by mouth once a day for 5 days for infection. This medication is an antibiotic. Take it exactly as directed. You must finish the entire course of medication, even if you feel better after the first few days of treatment.    Start taking the antibiotics I've prescribed right away. You need to finish the entire course of antibiotics, even if you start to feel better before the pills run out.    Some people develop a yeast infection after taking antibiotics. If you get a yeast infection, you can treat it with antifungal creams or suppositories. These are available without a prescription at [employer] and many supermarkets.   About your diagnosis   Strep throat is an infection in the throat and tonsils caused by group A streptococcus bacteria. Strep throat is most often spread by droplets that are released into the air after an infected person coughs or sneezes. You can also get strep throat by sharing cups or utensils with an infected person.   Symptoms usually begin within 2 to 5 days after a person has been exposed. The pain from strep throat usually starts quickly and can be severe, especially when swallowing. Body aches and pains are common.   What to expect   If you follow this  "treatment plan, you should start to feel better within a few days.   When to seek care   Call us at [phone]   with any sudden or unexpected symptoms.    Symptoms that don't improve within 48 hours of starting antibiotics.    Symptoms that get better for a few days and then suddenly get worse.    Worsening fever.    Your throat pain becomes worse and makes swallowing extremely difficult or impossible.    Muffled voice (it may sound like you are talking with a mouthful of food).    Difficulty opening your mouth or jaw.    Stiff neck.    Joint pain, or swelling that moves from joint to joint.    Severe stomach pain.    Shortness of breath.    Nosebleed.    Severe fatigue.    A new rash.    Odd emotional or behavioral changes.    Uncontrollable body movements or \"jerkiness.\"    Severe chest pain.   Other treatment    Rest and drink plenty of water.    Choose throat-friendly liquids and foods, such as lemon tea, broth, applesauce, frozen yogurt, or popsicles.    Gargle with salt water several times a day to help with your throat pain.    Try cough drops and throat lozenges for extra relief.    Stir a teaspoon of honey into warm water or weak tea to help soothe a sore throat.    Avoid smoke and air pollution. Smoke can make infections worse.   Prevention    Avoid close contact with other people when you're sick.    Cover your mouth and nose when you cough or sneeze. Use a tissue or cough into your elbow. Make sure that used tissues go directly into the trash.    Avoid touching your eyes, nose, or mouth while you're sick.    Wash your hands often, especially after coughing, sneezing, or blowing your nose. If soap and water are not available, use an alcohol-based hand .    If you or someone in your home or workplace is sick, disinfect commonly used items. This includes door handles, tables, computers, remotes, and pens.    Coronavirus (COVID-19) information   Common symptoms of COVID-19 include fever, " cough, shortness of breath, fatigue, muscle or body aches, headaches, new loss of sense of taste or smell, sore throat, stuffy or runny nose, nausea or vomiting, and diarrhea. Most people who get COVID-19 have mild symptoms and can rest at home until they get better. Elderly people and those with chronic medical problems may be at risk for more serious complications.   FAQs about the COVID-19 vaccine   Are the vaccines safe?   Yes. Hundreds of millions of people in the [place] have already safely received COVID-19 vaccines under the most intense safety monitoring in the history of the [place].   Do I need the vaccine if I've already had COVID?   Yes. Vaccination helps protect you even if you've already had COVID.   If you had COVID-19 and had symptoms, wait to get vaccinated until you've recovered and completed your isolation period.   If you tested positive for COVID-19 but did not have symptoms, you can get vaccinated after 5 full days have passed since you had a positive test, as long as you don't develop symptoms.   How many doses of the vaccine do I need?   Visit www.cdc.gov/coronavirus/2019-ncov/vaccines/stay-up-to-date.html   to find out how to stay up to date with your COVID-19 vaccines.   I'm immunocompromised. How many doses of the vaccine do I need?   For information on how immunocompromised people can stay up to date with their COVID-19 vaccines, visit www.cdc.gov/coronavirus/2019-ncov/vaccines/recommendations/immuno.html  .   What are the common side effects of the vaccine?   A sore arm, tiredness, headache, and muscle pain may occur within two days of getting the vaccine and last a day or two. For the Moderna or Pfizer vaccines, side effects are more common after the second dose. People over the age of 55 are less likely to have side effects than younger people.   After I'm up to date on vaccines, can I still get or spread COVID?   Yes, you can still get COVID, but your disease should be milder. And your risk of  serious illness, hospitalization, and complications will be much lower, especially if you're up to date. Unfortunately, you can still spread COVID if you've been vaccinated. That's why it's important to follow isolation guidelines if you get sick or test positive.   After I'm up to date on vaccines, can I go back to normal?   You should still wear a mask indoors in public if:    It's required by laws, rules, regulations, or local guidance.    You have a weakened immune system.    Your age puts you at increased risk of severe disease.    You have a medical condition that puts you at increased risk of severe disease.    Someone in your household has a weakened immune system, is at increased risk for severe disease, or is unvaccinated.    You're in an area of high transmission.   Where can I get a COVID-19 vaccine?   Visit [facility]'s website for more information. To find a COVID-19 vaccination site near you, visit www.[employer].gov/  , call [phone]  , or text your zip code to [phone] ([employer]). Message and data rates may apply.   I've had close contact with someone who has COVID. Do I need to quarantine, and if so, for how long?   For the most current answer, including a calculator to determine whether you need to stay home and for how long, visit www.cdc.gov/coronavirus/2019-ncov/your-health/isolation.html  .   I've tested positive for COVID. How long do I need to isolate?   For the latest recommendations, including a calculator to determine how long you need to stay home, visit www.cdc.gov/coronavirus/2019-ncov/your-health/isolation.html  .   What if I develop symptoms that might be from COVID?   For the latest recommendations on what to do if you're sick, including when to seek emergency care, visit www.cdc.gov/coronavirus/2019-ncov/if-you-are-sick/index.html  .    Flu vaccine information   Who should get a flu vaccine?   Everyone 6 months of age and older should get a yearly flu vaccine.   When should I  get vaccinated?   You should get a flu vaccine by the end of October. Once you're vaccinated, it takes about two weeks for antibodies to develop and protect you against the flu. That's why it's important to get vaccinated as soon as possible.   After October, is it too late to get vaccinated?   No. You should still get vaccinated. As long as the flu viruses are still in your community, flu vaccines will remain available, even into January of next year or later.   Why do I need a flu vaccine EVERY year?   Flu viruses are constantly changing, so flu vaccines are usually updated from one season to the next. Your protection from the flu vaccine also lessens over time.   Is the flu vaccine safe?   Yes. Over the last 50 years, hundreds of millions of Americans have safely received the flu vaccines.   What are the side effects of flu vaccines?   You CANNOT get the flu from a flu vaccine. Common side effects of the flu shot include soreness, redness and/or swelling where the shot was given, low grade fever, and aches. Common side effects of the nasal spray flu vaccine for adults include runny nose, headaches, sore throat, and cough. For children, side effects include wheezing, vomiting, muscle aches, and fever.   Does the flu vaccine increase your risk of getting COVID-19?   No. There is no evidence that getting a flu vaccine increases your risk of getting COVID-19.   Is it safe to get the flu vaccine along with a COVID-19 vaccine?   Yes. It's safe to get the flu vaccine with a COVID-19 vaccine or booster.   Contact your healthcare provider TODAY for details on when and where to get your flu vaccine.   Your provider   Your diagnosis was provided by ARIES Youngblood, a member of your trusted care team at [facility].   If you have any questions, call us at [phone]  .

## 2024-04-11 ENCOUNTER — SPECIALTY PHARMACY (OUTPATIENT)
Dept: ENDOCRINOLOGY | Age: 34
End: 2024-04-11
Payer: COMMERCIAL

## 2024-04-11 NOTE — PROGRESS NOTES
Specialty Pharmacy Refill Coordination Note     Belem is a 33 y.o. female contacted today regarding refills of  1 specialty medication(s).    Reviewed and verified with patient:       Specialty medication(s) and dose(s) confirmed: yes    Refill Questions      Flowsheet Row Most Recent Value   Changes to allergies? No   Changes to medications? No   New conditions or infections since last clinic visit No   Unplanned office visit, urgent care, ED, or hospital admission in the last 4 weeks  No   How does patient/caregiver feel medication is working? Good   Financial problems or insurance changes  No   Since the previous refill, were any specialty medication doses or scheduled injections missed or delayed?  No   Does this patient require a clinical escalation to a pharmacist? No            Delivery Questions      Flowsheet Row Most Recent Value   Delivery method [employer]   Delivery address verified with patient/caregiver? Yes   Delivery address Home   Number of medications in delivery 1   Medication(s) being filled and delivered Norethindrone (Progestin Contraceptives - Oral)   Doses left of specialty medications 1   Copay verified? Yes   Copay amount 0   Copay form of payment No copayment ($0)                   Follow-up: 25 day(s)     Cee Laurent, Pharmacy Technician  Specialty Pharmacy Technician

## 2024-04-15 ENCOUNTER — SPECIALTY PHARMACY (OUTPATIENT)
Dept: ENDOCRINOLOGY | Age: 34
End: 2024-04-15
Payer: COMMERCIAL

## 2024-04-15 NOTE — PROGRESS NOTES
Specialty Pharmacy Refill Coordination Note     Belem is a 33 y.o. female contacted today regarding refills of  1 specialty medication(s).    Reviewed and verified with patient:       Specialty medication(s) and dose(s) confirmed: yes    Refill Questions      Flowsheet Row Most Recent Value   Changes to allergies? No   Changes to medications? No   New conditions or infections since last clinic visit No   Unplanned office visit, urgent care, ED, or hospital admission in the last 4 weeks  No   How does patient/caregiver feel medication is working? Good   Financial problems or insurance changes  No   Since the previous refill, were any specialty medication doses or scheduled injections missed or delayed?  No   Does this patient require a clinical escalation to a pharmacist? No            Delivery Questions      Flowsheet Row Most Recent Value   Delivery method [employer]   Delivery address verified with patient/caregiver? Yes   Delivery address Home   Number of medications in delivery 1   Medication(s) being filled and delivered Semaglutide-Weight Management   Doses left of specialty medications 1 week   Copay verified? Yes   Copay amount 479.72   Copay form of payment HAS/FSA on file                   Follow-up: 25 day(s)     Cee Laurent, Pharmacy Technician  Specialty Pharmacy Technician

## 2024-04-24 ENCOUNTER — E-VISIT (OUTPATIENT)
Dept: FAMILY MEDICINE CLINIC | Facility: TELEHEALTH | Age: 34
End: 2024-04-24
Payer: COMMERCIAL

## 2024-04-24 PROCEDURE — BRIGHTMDVISIT: Performed by: NURSE PRACTITIONER

## 2024-04-24 NOTE — EXTERNAL PATIENT INSTRUCTIONS
Note   This is likely a viral illness. Viral illnesses do not respond to antibiotics. It is best to treat viruses with rest and drinking plenty of fluids. Over the counter medications can help ease symptoms. I have sent you a mutlisymptoms relief medication for treatment of your symptoms. Most often these initial symptoms are associated with a virus or even allergies. According to guidelines in treating sinus infections, studies show that 50%-67% of sinusitis cases are reportedly attributable to viruses. I recommend using oxymetazoline only for 2-3 days and sparingly. Criteria for prescribing antibiotics for sinus infection: 1. signs or symptoms of sinusitis (cough, congestion, and/or post-nasal drip) that last = 10 days without clinical improvement 2. worsening signs or symptoms following initial improvement (double sickening) 3. severe symptoms including fever = 39 degrees C (102 degrees F) and purulent nasal discharge lasting = 3-4 consecutive days 4. unilateral cheek or maxillary tooth pain and purulent nasal discharge   Diagnosis   Common cold   My name is ARIES Molina, and I'm a healthcare provider at [facility]. I reviewed your interview, and I see that you may have a cold.   To prevent the spread of illness to others, stay home and away from other people as much as possible while you're sick. When you need to be around other people, consider wearing a face mask.   Here are the main things to know about your current symptoms:    Colds get better on their own.    You can use the medications recommended here to help ease your symptoms.   Based on what you told me in your interview, I haven't prescribed antibiotics. Antibiotics fight bacteria, not viruses. They don't help when you have a viral infection like the common cold. Antibiotics could even make you feel worse as they can cause or worsen nausea, diarrhea, and stomach pain. The following factors suggest that a virus is causing your  symptoms:    You've had symptoms for less than 10 days. A bacterial infection is suspected when you've had symptoms longer than 10 days without improvement.    You haven't had a fever. Bacterial infections can cause a high fever.    Your nasal discharge is thin.    Your glands/lymph nodes are swollen or tender to the touch. Swollen lymph nodes are common with viral conditions like yours.    In addition to your sore throat, you have other cold symptoms like a stuffy nose or cough. This suggests a viral infection, rather than a bacterial infection such as strep throat.   About your diagnosis   The common cold is a viral infection of the respiratory tract, which includes the nose, throat, breathing passages, and lungs. These are the key things to know about colds:    There is no vaccine to prevent colds and no cure for a cold.    Some medications can lessen your symptoms.    You can spread a cold to other people.    Over 200 different viruses can cause the common cold. That's why you can get another cold after you've just had one.   Common symptoms include low-grade fever, sneezing, runny nose, congestion, sore throat, and cough. You may also notice fatigue, body aches, difficulty sleeping, or decreased appetite.   What to expect   You should feel better within 5 to 7 days and be back to normal within 14 days.   When to seek care   Call us at [phone]   with any sudden or unexpected symptoms.    Fever that measures over 103F or continues for more than 3 days.    A worsening headache.    Swallowing becomes extremely difficult or impossible.    Hoarse voice or loss of voice lasting longer than 2 weeks.    Your sinus pain continues for 10 days or more, without improvement.    More than 5 episodes of diarrhea in a day.    More than 5 episodes of vomiting in a day.    Coughing up red or bloody mucus.    Severe shortness of breath.    Severe stomach pain.    Symptoms that get better for a few days, and then suddenly  get worse.    Severe chest pain   Other treatment    Rest! Your body needs rest to recover and fight the virus.    Drink plenty of water to stay hydrated.    Use a clean humidifier or a cool-mist vaporizer in your room at night. Breathing humid air may help with nasal congestion and cough.    Try non-prescription saline nasal sprays to help your nasal symptoms.    Try using a Neti Pot to flush out your stuffy nose and sinuses. Neti Pots are available at any drugstore without a prescription.    Gargle with salt water several times a day to help your throat feel better. Cough drops and throat lozenges may provide extra relief. A teaspoon of honey stirred into warm water or weak tea can help soothe a sore throat and cough.    Avoid smoke and air pollution. Smoke can make infections worse.   Prevention    Avoid close contact with other people when you're sick.    Cover your mouth and nose when you cough or sneeze. Use a tissue or cough into your elbow. Make sure that used tissues go directly into the trash.    Avoid touching your eyes, nose, or mouth while you're sick.    Wash your hands often, especially after coughing, sneezing, or blowing your nose. If soap and water are not available, use an alcohol-based hand .    If you or someone in your home or workplace is sick, disinfect commonly used items. This includes door handles, tables, computers, remotes, and pens.    Coronavirus (COVID-19) information   Common symptoms of COVID-19 include fever, cough, shortness of breath, fatigue, muscle or body aches, headaches, new loss of sense of taste or smell, sore throat, stuffy or runny nose, nausea or vomiting, and diarrhea. Most people who get COVID-19 have mild symptoms and can rest at home until they get better. Elderly people and those with chronic medical problems may be at risk for more serious complications.   FAQs about the COVID-19 vaccine   Are the vaccines safe?   Yes. Hundreds of millions of people in  the [place] have already safely received COVID-19 vaccines under the most intense safety monitoring in the history of the [place].   Do I need the vaccine if I've already had COVID?   Yes. Vaccination helps protect you even if you've already had COVID.   If you had COVID-19 and had symptoms, wait to get vaccinated until you've recovered and completed your isolation period.   If you tested positive for COVID-19 but did not have symptoms, you can get vaccinated after 5 full days have passed since you had a positive test, as long as you don't develop symptoms.   How many doses of the vaccine do I need?   Visit www.cdc.gov/coronavirus/2019-ncov/vaccines/stay-up-to-date.html   to find out how to stay up to date with your COVID-19 vaccines.   I'm immunocompromised. How many doses of the vaccine do I need?   For information on how immunocompromised people can stay up to date with their COVID-19 vaccines, visit www.cdc.gov/coronavirus/2019-ncov/vaccines/recommendations/immuno.html  .   What are the common side effects of the vaccine?   A sore arm, tiredness, headache, and muscle pain may occur within two days of getting the vaccine and last a day or two. For the Moderna or Pfizer vaccines, side effects are more common after the second dose. People over the age of 55 are less likely to have side effects than younger people.   After I'm up to date on vaccines, can I still get or spread COVID?   Yes, you can still get COVID, but your disease should be milder. And your risk of serious illness, hospitalization, and complications will be much lower, especially if you're up to date. Unfortunately, you can still spread COVID if you've been vaccinated. That's why it's important to follow isolation guidelines if you get sick or test positive.   After I'm up to date on vaccines, can I go back to normal?   You should still wear a mask indoors in public if:    It's required by laws, rules, regulations, or local guidance.    You have a weakened  immune system.    Your age puts you at increased risk of severe disease.    You have a medical condition that puts you at increased risk of severe disease.    Someone in your household has a weakened immune system, is at increased risk for severe disease, or is unvaccinated.    You're in an area of high transmission.   Where can I get a COVID-19 vaccine?   Visit [facility]'s website for more information. To find a COVID-19 vaccination site near you, visit [email or web address]/  , call [phone]  , or text your zip code to 190491 ([employer]). Message and data rates may apply.   I've had close contact with someone who has COVID. Do I need to quarantine, and if so, for how long?   For the most current answer, including a calculator to determine whether you need to stay home and for how long, visit www.cdc.gov/coronavirus/2019-ncov/your-health/isolation.html  .   I've tested positive for COVID. How long do I need to isolate?   For the latest recommendations, including a calculator to determine how long you need to stay home, visit www.cdc.gov/coronavirus/2019-ncov/your-health/isolation.html  .   What if I develop symptoms that might be from COVID?   For the latest recommendations on what to do if you're sick, including when to seek emergency care, visit www.cdc.gov/coronavirus/2019-ncov/if-you-are-sick/index.html  .    Flu vaccine information   Who should get a flu vaccine?   Everyone 6 months of age and older should get a yearly flu vaccine.   When should I get vaccinated?   You should get a flu vaccine by the end of October. Once you're vaccinated, it takes about two weeks for antibodies to develop and protect you against the flu. That's why it's important to get vaccinated as soon as possible.   After October, is it too late to get vaccinated?   No. You should still get vaccinated. As long as the flu viruses are still in your community, flu vaccines will remain available, even into January of next year or later.    Why do I need a flu vaccine EVERY year?   Flu viruses are constantly changing, so flu vaccines are usually updated from one season to the next. Your protection from the flu vaccine also lessens over time.   Is the flu vaccine safe?   Yes. Over the last 50 years, hundreds of millions of Americans have safely received the flu vaccines.   What are the side effects of flu vaccines?   You CANNOT get the flu from a flu vaccine. Common side effects of the flu shot include soreness, redness and/or swelling where the shot was given, low grade fever, and aches. Common side effects of the nasal spray flu vaccine for adults include runny nose, headaches, sore throat, and cough. For children, side effects include wheezing, vomiting, muscle aches, and fever.   Does the flu vaccine increase your risk of getting COVID-19?   No. There is no evidence that getting a flu vaccine increases your risk of getting COVID-19.   Is it safe to get the flu vaccine along with a COVID-19 vaccine?   Yes. It's safe to get the flu vaccine with a COVID-19 vaccine or booster.   Contact your healthcare provider TODAY for details on when and where to get your flu vaccine.   Your provider   Your diagnosis was provided by ARIES Molina, a member of your trusted care team at [facility].   If you have any questions, call us at [phone]  .

## 2024-04-24 NOTE — E-VISIT TREATED
Chief Complaint: Cold, flu, COVID, sinus, hay fever, or seasonal allergies   Patient introduction   Patient is 33-year-old female with cough, congestion, nasal discharge, and sore throat that started 3 to 5 days ago. Regarding date of symptom onset, patient writes: 04/21/2024.   COVID-19 testing history, vaccination status, and exposure:    Has not been tested for COVID-19 since symptom onset.    Patient was prompted to take a self-test during the interview, but does not have a COVID-19 test kit.    Has not received an updated [id number] COVID-19 vaccination (Pfizer-BioNTech or Moderna vaccine after September 12, 2023; or Novavax vaccine after October 3, 2023).    No known exposure to a person with a confirmed or suspected case of COVID-19.    No high-risk (household) exposure to COVID-19 within the last 14 days.   Risk factors for severe disease from COVID-19 infection    BMI >= 40.    Mostly sedentary lifestyle.   General presentation   Symptoms came on gradually.   Fever:    No fever.   Sinus and nasal symptoms:    Nasal discharge.    Yellow nasal drainage.    Nasal drainage is thin.    Postnasal drip.    1 to 3 episodes of antibiotic treatment for sinus infection in the last year.    Sinus pain or pressure on or around the forehead, eyes, nose, cheeks, and upper teeth or jaw.    Patient first noticed sinus pain less than 5 days ago.    Sinus pain is worse with Valsalva.    No itchy nose or sneezing.    No history of unhealed nasal septal ulcer/nasal wound.    No history of deviated septum or nasal polyps.   Throat symptoms:    Sore throat. Has redness in the back of the throat and on the tonsils.    Has pain when swallowing but can swallow liquids and solid foods.   Head and body aches:    No headache.    No sweats.    No chills.    No myalgia.    No fatigue.   Cough:    Cough is worse in the morning.    Cough is mildly productive of sputum.    Describes color of sputum as yellow.   Wheezing and shortness of  "breath:    Has previously used an albuterol inhaler for URI, bronchitis, or pneumonia.    Has previously used a steroid inhaler for URI, bronchitis, or pneumonia.    Not using an albuterol inhaler for current symptoms because they do not feel they need it.    No COPD diagnosis.    No asthma diagnosis.    No wheezing.    No shortness of breath.   Chest pain:    No chest pain.   Ear symptoms:    Current symptoms include pain, pressure, fullness, crackling or popping, and a plugged or blocked sensation in the ear(s).   Dizziness:    Mild dizziness that does not interfere with daily activities.   Allergies:    Patient has known seasonal allergies.    Patient does not think symptoms are allergy-related.   Flu exposure:    No recent known exposure to a person with a confirmed flu diagnosis.    Received a flu vaccine more than 6 months ago.   Patient is taking over-the-counter medications for current symptoms, including acetaminophen and oxymetazoline.   Review of red flags/alarm symptoms:    No changes in alertness or awareness.    No paroxysmal cough followed by whoop on inspiration    No symptoms suggesting airway obstruction.    No decreased urination.    No blue or gray coloring present in face, lips, or nail beds.    No swelling, pain, redness, or increased warmth in the calf or lower part of ONE leg only.    No proptosis.   Risk factors for antibiotic resistance:    Close contact with a child in .    Antibiotic use for similar symptoms within the last 30 days.   Pregnancy/menstrual status/breastfeeding:    Not pregnant.    Not breastfeeding.    Regarding date of last menstrual period, patient writes: 4/20/2024.   Self-exam:    Height: 5' 6\"    Weight: 315 lbs    Patient does not have a home pulse oximeter, blood pressure monitor, or other device that could measure their heart rate.    Tonsillar edema.    Tonsils appear normal.    Palatal petechiae.    Swollen/painful neck lymph nodes.   Recent antibiotic " use:    Has taken antibiotics for similar symptoms within the past month. Patient specifies the antibiotics taken as I don't know it was for strep.   Current medications   Currently taking propranolol 80 MG tablet, NASAL SPRAY SINUS NA, norethindrone 0.35 MG tablet, and Citracal Prenatal + DHA.   Medication allergies   No relevant drug allergies.   Medication contraindication review   No history of anaphylactic reaction to beta-lactam antibiotics; aspirin triad; blood dyscrasia; bone marrow depression; catecholamine-releasing paraganglioma; coronary artery disease; coagulation disorder; congenital long QT syndrome; depression; electrolyte abnormalities; fungal infection; GI bleeding; GI obstruction; G6PD deficiency; heart arrhythmia; hypertension; mononucleosis; myasthenia; recent myocardial infarction; NSAID-induced asthma/urticaria; Parkinson's disease; pheochromocytoma; porphyria; Reye syndrome; seizure disorder; ulcerative colitis; and urinary retention.   No history of metoclopramide-associated dystonic reaction and tardive dyskinesia.   No known history of amoxicillin-clavulanate-associated cholestatic jaundice or hepatic impairment.   No known history of azithromycin-associated cholestatic jaundice or hepatic impairment.   Past medical history   Immune conditions:   No immunocompromising conditions.   No history of cancer.   Social history   Never smoked tobacco.   High-risk household contacts:    Household contact under the age of 5.   Patient-submitted comments   Patient was asked if they had anything to add about their symptoms. Patient writes: My sinus drainage is mostly watery but when it is phlegmy it's yellow and brown and almost hurts to blow my nose..   Patient did not request an excuse note.   Assessment   Acute nasopharyngitis (common cold).   This is the likely diagnosis based on patient's interview responses, including:    Symptom profile    Gradual onset of symptoms   Plan   Medications:   No  medications prescribed.   Education:    Condition and causes    Prevention    Treatment and self-care    When to call provider   ----------   Electronically signed by ARIES Molina on 2024-04-24 at 14:20PM   ----------   Patient Interview Transcript:   Which of these symptoms are bothering you? Select all that apply.    Cough    Stuffed-up nose or sinuses    Runny nose    Sore throat   Not selected:    Shortness of breath    Itchy or watery eyes    Itchy nose or sneezing    Loss of smell or taste    Hoarse voice or loss of voice    Headache    Fever    Sweats    Chills    Muscle or body aches    Fatigue or tiredness    Nausea or vomiting    Diarrhea    I don't have any of these symptoms   When did your current symptoms start? Select one.    3 to 5 days ago   Not selected:    Less than 48 hours ago    6 to 9 days ago    10 to 14 days ago    2 to 3 weeks ago    3 to 4 weeks ago    More than a month ago   Do you know the exact date your symptoms started? If so, enter the date as MM/DD/YY. Select one.    Yes (specify): 04/21/2024   Not selected:    No   Did your symptoms come on suddenly or gradually? Select one.    Gradually   Not selected:    Suddenly    I'm not sure   Since your current symptoms started, have you been tested for COVID-19? This includes home self-tests as well as nose swab or saliva tests done at a doctor's office, lab, or testing site. Select one.    No   Not selected:    Yes   Taking a home COVID test can help your provider give you the best care. - If you have a COVID test kit, take the test now before continuing this interview. - If you choose not to take a test or don't have one, you should still continue this interview. Your provider can still help you get care. Do you have a COVID test kit? Select one.    No, I don't have a test kit   Not selected:    Yes, and I'll take a test now    Yes, but I prefer not to take a test now   Has anyone in your household tested positive for COVID-19  "in the past 14 days? Select one.    No   Not selected:    Yes   In the last 14 days, have you had close contact with someone who has COVID-19? \"Close contact\" means any of these: - Caring for someone with COVID-19. - Being within 6 feet of someone with COVID-19 for a total of at least 15 minutes over a 24-hour period. For example, three 5-minute exposures for a total of 15 minutes. - Being in direct contact with respiratory droplets from someone with COVID-19 (being coughed on, kissing, sharing utensils). Select one.    No, not that I know of   Not selected:    Yes, a confirmed case    Yes, a suspected case   Have you gotten the [id number] updated COVID-19 vaccine? This means either the updated Pfizer-BioNTech or Moderna vaccine after September 12, 2023; or the updated Novavax vaccine after October 3, 2023. Select one.    No   Not selected:    Yes   Since your symptoms started, have you felt dizzy? Select one.    Yes, but I can still do my regular daily activities   Not selected:    Yes, and it makes it hard to stand, walk, or do daily activities    No   Do you have chest pain? You might also feel it as discomfort, aching, tightness, or squeezing in the chest. Select one.    No   Not selected:    Yes   Do you have any of these devices at home? Select all that apply.    No   Not selected:    A home pulse oximeter    Home blood pressure monitor    Apple Watch or other smart watch    FitBit or other smart wristband    Other wearable device   Do you cough so hard that it's made you gag or vomit? By gag, we mean has your coughing made you choke or dry heave? Select all that apply.    Yes, my coughing has made me gag   Not selected:    Yes, my coughing has made me vomit    No   Does your cough come in spasms of 10 to 20 coughs in a row, followed by a loud whoop when breathing in? Select one.    No   Not selected:    Yes   When is your cough the worst? Select all that apply.    In the morning, or when I wake up   Not " "selected:    During the day    At nighttime, or while I'm sleeping    I haven't noticed a difference depending on time of day   Are you coughing up mucus or phlegm? Select one.    Yes, a little   Not selected:    No, my cough is dry    Yes, a lot   What color is most of the mucus or phlegm that you're coughing up? Select one.    Yellow or yellowish   Not selected:    Clear    White/frothy    Green or greenish    Red or pink    I'm not sure   Do you feel sinus pain or pressure in any of these areas?    In my forehead    Around my eyes    Behind my nose    In my cheeks    In my upper teeth or jaw   Not selected:    No   When did you first notice your sinus pain or pressure? Select one.    Less than 5 days ago   Not selected:    5 to 9 days ago    10 to 14 days ago    2 to 4 weeks ago    1 month ago or longer   Does coughing, sneezing, or leaning forward make your sinuses feel worse? Select one.    Yes   Not selected:    No   What color is your nasal drainage? Select one.    Yellow or yellowish   Not selected:    Clear    White    Green or greenish    My nose is stuffed but not draining or running   Is your nasal drainage thick or thin? Select one.    Thin   Not selected:    Thick   Is there any drainage (mucus) going down the back of your throat? This kind of drainage is also called \"postnasal drip.\" It can cause frequent throat clearing. Select one.    Yes   Not selected:    No, not that I know of   Can you swallow liquids and solid foods? A sore throat may be painful when swallowing, but it shouldn't prevent you from swallowing. Select one.    Yes, but it's painful   Not selected:    Yes, with ease    Yes, but it's uncomfortable    It's hard to swallow anything because it feels like liquids and food get stuck in my throat    No, I can't swallow anything, liquid or solid foods   Is your throat pain worse on one side than the other? Select one.    No, it's the same on both sides   Not selected:    Yes, it's worse on " "the right side    Yes, it's worse on the left side   To recommend the best treatment, we need to see photos of your throat. You can have someone else take the photo, or use a mirror. If you choose not to send photos, you can still continue this interview, but your treatment options may be affected. Select one.    I'll take the photos myself   Not selected:    Someone can take the photos for me    I'd rather not send photos   Send at least 2 photos for review. - Switch the flash to On (not auto). - Stand close to a mirror. - Don't use the \"selfie\" camera. Instead, turn the phone around and tilt it slightly up. - Looking in the mirror, tap to focus on the back of the throat, not the teeth. - Say \"Ah\" so the back of your throat is visible, and take the photo. - Before sending, make sure the photos are clear and the throat is in focus.    Upload 1    Upload 2   Not selected:    Upload 3   Have you urinated at least 3 times in the last 24 hours? Select one.    Yes   Not selected:    No   Do your face, lips, or nail beds appear blue or gray? Select one.    No   Not selected:    Yes   Do you have any swelling, pain, redness, or increased warmth in the calf or lower part of ONE leg only? Select one.    No   Not selected:    Yes   Changes in alertness or awareness may mean you need emergency care. Since your symptoms started, have you had any of these? Select all that apply.    None of the above   Not selected:    Confusion    Slurred speech    Not knowing where you are or what day it is    Difficulty staying conscious    Fainting or passing out   Do your symptoms include a whistling sound, or wheezing, when you breathe? Select one.    No   Not selected:    Yes   Since your symptoms started, have you noticed that one or both of your eyes is bulging or poking out? Select one.    No   Not selected:    Yes   Do you have any of these symptoms in your ear(s)? Select all that apply.    Pain    Pressure    Fullness    Crackling or " popping    Plugged or blocked sensation   Not selected:    None of the above   Are your tonsils larger than usual?    Yes   Not selected:    No, not that I can tell    I've had my tonsils removed   Is there redness in the back of your throat or on your tonsils? Select all that apply.    Yes, in the back of the throat    Yes, on the tonsils   Not selected:    No, not that I can see   Is there any white or yellow pus on your tonsils?    No, not that I can see   Not selected:    Yes   Are there red spots on the roof of your mouth or the back of your throat?    Yes   Not selected:    No, not that I can see   Are your glands/lymph nodes swollen, or does it hurt when you touch them?    Yes   Not selected:    No, not that I can tell   In the past week, has anyone around you (such as at school, work, or home) had a confirmed diagnosis of the flu? A confirmed diagnosis means that a nose swab was done to verify a flu infection. Select all that apply.    No, not that I know of   Not selected:    I live with someone who has the flu    I've been within touching distance of someone who has the flu    I've walked by, or sat about 3 feet away from, someone who has the flu    I've been in the same building as someone who has the flu   Have you ever been diagnosed with asthma? Select one.    No   Not selected:    Yes   Have you ever been prescribed albuterol to use for wheezing, cough, or shortness of breath caused by a cold, bronchitis, or pneumonia? Albuterol (ProAir, Proventil, Ventolin) is prescribed as an inhaler or a solution to be used with a nebulizer machine. Select one.    Yes   Not selected:    No, not that I know of   Have you ever been prescribed a steroid inhaler to use for wheezing, cough, or shortness of breath caused by a cold, bronchitis, or pneumonia? Some examples of steroid inhalers include Pulmicort, Flovent, Qvar, and Alvesco. Select one.    Yes   Not selected:    No, not that I know of   Have you used  albuterol for your current symptoms? This includes both albuterol inhalers and albuterol solution in a nebulizer machine. Select one.    No, I don't feel like I need it   Not selected:    Yes    No, I don't have any, or it's    Do you need a refill of albuterol? Select one.    No   Not selected:    Yes   Have you ever been diagnosed with chronic obstructive pulmonary disease (COPD)? Select one.    No, not that I know of   Not selected:    Yes   In the past month, have you taken antibiotics for similar symptoms? Examples of antibiotics include amoxicillin, amoxicillin-clavulanate (Augmentin), penicillin, cefdinir (Omnicef), doxycycline, and clindamycin (Cleocin). Select one.    Yes (specify): I don't know it was for strep   Not selected:    No   In the last year, how many times were you treated with antibiotics for a sinus infection? Select one.    1 to 3 times   Not selected:    None    4 or more times   Do any of these apply to you? Select all that apply.    I'm in close contact with a child in    Not selected:    I've been hospitalized within the last 5 days    I have diabetes    None of the above   Have you been diagnosed with a deviated septum or nasal polyps? The nose is divided into two nostrils by the septum. A crooked septum is called a deviated septum. Nasal polyps are growths inside the nose or sinuses. Select one.    No, not that I know of   Not selected:    Yes, but I had surgery to treat them    Yes, I have a deviated septum    Yes, I have nasal polyps    Yes, I have a deviated septum and nasal polyps   Do you have a sore inside your nose that won't heal? Select one.    No, not that I know of   Not selected:    Yes   Do you have allergies (pollen, dust mites, mold, animal dander)? Select one.    Yes   Not selected:    No, not that I know of   What kind of allergies do you have? Select all that apply.    Seasonal allergies (hay fever)   Not selected:    Perennial, or year-round, allergies  (hay fever)    Pet allergies    Dust allergies    None of the above    I'm not sure   Do you think your symptoms could be allergy-related? Select one.    No   Not selected:    Yes    I'm not sure   Have you had a flu shot this season? Select one.    Yes, more than 6 months ago   Not selected:    Yes, less than 2 weeks ago    Yes, 2 to 4 weeks ago    Yes, 1 to 3 months ago    Yes, 3 to 6 months ago    No   Are you pregnant? Select one.    No   Not selected:    Yes   When was your last menstrual period? If you don't currently have periods or no longer have periods, please briefly explain.    2024   Within the last 2 weeks, have you: - Given birth - Had a miscarriage - Had a pregnancy loss - Had an  Being postpartum (live birth or loss) within the last 2 weeks increases your risk of flu complications. Select one.    No   Not selected:    Yes   Are you breastfeeding? Select one.    No   Not selected:    Yes   The flu and COVID-19 can be more serious for people in certain groups. The next few questions help us figure out if you or anyone you live with is at higher risk for complications from these infections. Do any of these statements apply to you? Select all that apply.    None of the above   Not selected:    I'm     I'm     I'm Black    I'm  or    Do you smoke tobacco? Select one.    No   Not selected:    Yes, every day    Yes, some days    No, I quit   Do you have a mostly inactive lifestyle? Answer yes if all of these are true: - You spend at least 6 hours a day sitting or lying down - You get less than 2 and a half hours per week of moderate exercise such as walking fast - You get less than 1 hour and 15 minutes per week of intense exercise such as jogging or running Select one.    Yes   Not selected:    No   Do you have any of these conditions? Select all that apply.    None of the above   Not selected:    Chronic lung disease, such as cystic fibrosis or  interstitial fibrosis    Heart disease, such as congenital heart disease, congestive heart failure, or coronary artery disease    Disorder of the brain, spinal cord, or nerves and muscles, such as dementia, cerebral palsy, epilepsy, muscular dystrophy, or developmental delay    Metabolic disorder or mitochondrial disease    Cerebrovascular disease, such as stroke or another condition affecting the blood vessels or blood supply to the brain    Down syndrome    Mood disorder, including depression or schizophrenia spectrum disorders    Substance use disorder, such as alcohol, opioid, or cocaine use disorder    Tuberculosis    Primary immunodeficiency   Do you live in a group care setting? Examples include: - Nursing home - Residential care - Psychiatric treatment facility - Group home - [facility] and care home - Homeless shelter - Foster care setting Select one.    No   Not selected:    Yes   Are you a healthcare worker? Select one.    No   Not selected:    Yes   People with a very high body mass index (BMI) are at higher risk for developing complications from the flu and severe illness from COVID-19. To determine your BMI, we need to know your weight and height. Please enter your weight (in pounds).    Weight   Please enter your height.    Height   Do you have any of these conditions that can affect the immune system? Scroll to see all options. Select all that apply.    None of these   Not selected:    History of bone marrow transplant    Chronic kidney disease    Chronic liver disease (including cirrhosis)    HIV/AIDS    Inflammatory bowel disease (Crohn's disease or ulcerative colitis)    Lupus    Moderate to severe plaque psoriasis    Multiple sclerosis    Rheumatoid arthritis    Sickle cell anemia    Alpha or beta thalassemia    History of kidney, liver, heart, or other solid organ transplant    History of liver, heart, or other solid organ transplant    History of spleen removal    An autoimmune disorder  not listed here (specify)    A condition requiring treatment with long-term use of oral steroids (such as prednisone, prednisolone, or dexamethasone) (specify)   Have you ever been diagnosed with cancer? Select one.    No   Not selected:    Yes, I have cancer now    Yes, but I'm in remission   The flu and COVID-19 can be more serious for people in certain groups. Do any of these apply to the people who live with you? Select all that apply.    Under age 5   Not selected:    Over age 65            Black     or     Pregnant    Has given birth, had a miscarriage, had a pregnancy loss, or had an  in the last 2 weeks    None of the above   Does any member of your household have any of these medical conditions? Select all that apply.    None of the above   Not selected:    Asthma    Disorders of the brain, spinal cord, or nerves and muscles, such as dementia, cerebral palsy, epilepsy, muscular dystrophy, or developmental delay    Chronic lung disease, such as COPD or cystic fibrosis    Heart disease, such as congenital heart disease, congestive heart failure, or coronary artery disease    Cerebrovascular disease, such as stroke or another condition affecting the blood vessels or blood supply to the brain    Blood disorders, such as sickle cell disease    Diabetes    Metabolic disorders such as inherited metabolic disorders or mitochondrial disease    Kidney disorders    Liver disorders    Weakened immune system due to illness or medications such as chemotherapy or steroids    Children under the age of 19 who are on long-term aspirin therapy    Extreme obesity (BMI > 40)   Do you have any of these conditions? Scroll to see all options. Select all that apply.    None of the above   Not selected:    Aspirin triad (also known as Samter's triad or ASA triad)    Asthma or hives from taking aspirin or other NSAIDs, such as ibuprofen or naproxen    Blockage or narrowing of the  blood vessels of the heart    Blood clotting disorder    Blood dyscrasia, such anemia, leukemia, lymphoma, or myeloma    Bone marrow depression    Catecholamine-releasing paraganglioma    Congenital long QT syndrome    Depression    Difficulty urinating or completely emptying your bladder    Uncorrected electrolyte abnormalities    Fungal infection    Gastrointestinal (GI) bleeding    Gastrointestinal (GI) obstruction    G6PD deficiency    Recent heart attack    High blood pressure    Irregular heartbeat or heart rhythm    Mononucleosis (mono)    Myasthenia gravis    Parkinson's disease    Pheochromocytoma    Reye syndrome    Seizure disorder    Thyroid disease    Ulcerative colitis   Have you ever had either of these conditions? Select all that apply.    No   Not selected:    Metoclopramide-associated dystonic reaction    Tardive dyskinesia   Just a few more questions about medications, and then you're finished. Have you used any non-prescription medications or nasal sprays for your current symptoms? Examples include saline sprays, decongestants, NyQuil, and Tylenol. Select one.    Yes   Not selected:    No   Which of these non-prescription medications have you tried? Scroll to see all options. Select all that apply.    Acetaminophen (Tylenol)    Oxymetazoline (Afrin)   Not selected:    Budesonide (Rhinocort)    Cetirizine (Zyrtec)    Chlorpheniramine (Aller-chlor, Chlor-Trimeton)    Cromolyn (NasalCrom)    Dextromethorphan (Delsym, Robitussin, Vicks DayQuil Cough)    Diphenhydramine (Benadryl)    Fexofenadine (Allegra)    Fluticasone (Flonase)    Guaifenesin (Mucinex)    Guaifenesin/dextromethorphan (Delsym DM, Mucinex DM, Robitussin DM)    Ibuprofen (Advil, Motrin, Midol)    Ketotifen (Alaway, Zaditor)    Loratadine (Alavert, Claritin)    Naphazoline-pheniramine (Naphcon-A, Opcon-A, Visine-A)    Omeprazole (Prilosec)    Phenylephrine (Sudafed PE)    Triamcinolone (Nasacort)    None of the above   Have you taken  "any monoamine oxidase inhibitor (MAOI) medications in the last 14 days? Examples include rasagiline (Azilect), selegiline (Eldepryl, Zelapar), isocarboxazid (Marplan), phenelzine (Nardil), and tranylcypromine (Parnate). Select one.    No, not that I know of   Not selected:    Yes   Do you take Kynmobi or Apokyn (apomorphine)? Select one.    No   Not selected:    Yes   Are you still taking these medications listed in your medical record? If you're not taking any of these, click Next. Select all that apply.    propranolol 80 MG tablet    NASAL SPRAY SINUS NA    norethindrone 0.35 MG tablet   Are you taking any other medications, vitamins, or supplements? Select one.    Yes   Not selected:    No   Have you ever had an allergic or bad reaction to any medication? Select one.    Yes   Not selected:    No   Have you had an allergic or bad reaction to any of these medications? Select all that apply.    No, not that I know of   Not selected:    Baloxavir (Xofluza)    Benzonatate (Tessalon Perles)    Fluconazole, itraconazole, or terconazole (brands include Diflucan, Sporanox, Terazol)    Oseltamivir (Tamiflu) or zanamivir (Relenza)    Paxlovid, nirmatrelvir, or ritonavir (Norvir)   Have you had an allergic or bad reaction to any of these antibiotic medications? Select all that apply.    No, not that I know of   Not selected:    Penicillin or any \"-cillin\" antibiotic, such as amoxicillin, ampicillin, dicloxacillin, nafcillin, or piperacillin (Brands include Augmentin, Unasyn, and Zosyn)    Tetracycline or any \"-cycline\" antibiotic, such as doxycycline, demeclocycline, minocycline (Brands include Declomycin, Doryx, Dynacin, Oracea, Monodox, Panmycin, and Vibramycin)    Ciprofloxacin or any \"-floxacin\" antibiotic, such as gemifloxacin, levofloxacin, moxifloxacin, or ofloxacin (Brands include Factive, Cipro, Floxin, and Levaquin)    Cephalexin or any \"cef-\" antibiotic, such as cefazolin, cefdinir, cefuroxime, ceftriaxone, " "ceftazidime, or cefepime (Brands include Ancef, Ceftin, Fortaz, Keflex, Maxipime, Rocephin, and Simplicef)    Azithromycin or any \"-thromycin\" antibiotic, such as erythromycin or clarithromycin (Brands include Biaxin, Erythrocin, Z-ameya, and Zithromax)    Clindamycin or lincomycin (Brands include Cleocin and Lincocin)   Have you had an allergic or bad reaction to any of these medications? Select all that apply.    No, not that I know of   Not selected:    Albuterol or a similar medication    Atropine    Corticosteroid (steroid) medication, including topical steroids, inhaled steroids, nasal steroids, or oral steroids (budesonide, ciclesonide, dexamethasone, flunisolide, fluticasone, methylprednisolone, triamcinolone, prednisone (or brand names Alvesco, Deltasone, Flovent, Medrol, Nasacort, Rhinocort, or Veramyst)    Metoclopramide (Reglan)    Ondansetron (Zuplenz, Zofran ODT, Zofran)    Prochlorperazine (Compazine)   Have you had an allergic or bad reaction to any of these eye drops, nasal sprays, or inhalers? Scroll to see all options. Select all that apply.    No, not that I know of   Not selected:    Azelastine (Astelin, Astepro, Optivar)    Cromolyn (Crolom, NasalCrom)    Ipratropium (Atrovent)    Ketotifen (Alaway, Zaditor)    Pheniramine/naphazoline (Naphcon-A, Opcon-A, Visine-A)    Olopatadine (Pataday, Patanol, Pazeo)   Have you had an allergic or bad reaction to any of these non-prescription medications? Scroll to see all options. Select all that apply.    No, not that I know of   Not selected:    Acetaminophen (Tylenol)    Aspirin    Cetirizine (Zyrtec)    Dextromethorphan (Delsym, Robitussin, Vicks DayQuil Cough)    Diphenhydramine (Benadryl)    Fexofenadine (Allegra)    Guaifenesin (Mucinex)    Dextromethorphan (Delsym)    Ibuprofen (Advil, Motrin, Midol)    Loratadine (Alavert, Claritin)    Oxymetazoline (Afrin)    Phenylephrine (Sudafed PE)    Pseudoephedrine (Sudafed)   Are you allergic to milk or to " the proteins found in milk (for example, whey or casein)? A milk allergy is different from lactose intolerance. Select one.    No, not that I know of   Not selected:    Yes   Have you ever had jaundice or liver problems as a result of taking amoxicillin-clavulanate (Augmentin)? Jaundice is a condition in which the skin and the whites of the eyes turn yellow. Select all that apply.    No, not that I know of   Not selected:    Yes, jaundice    Yes, liver problems   Have you ever had jaundice or liver problems as a result of taking azithromycin (Zithromax, Zmax)? Jaundice is a condition in which the skin and the whites of the eyes turn yellow. Select all that apply.    No, not that I know of   Not selected:    Yes, jaundice    Yes, liver problems   Do you need a doctor's note? A doctor's note confirms that you received care today and states when you can return to school or work. It does not contain information about your diagnosis or treatment plan. Your provider will make the final decision on whether to give you a doctor's note and for how long. Doctor's notes CANNOT be backdated. We can't provide medical leave paperwork through this type of visit. If more paperwork is needed to request time off, contact your primary care provider. Select one.    No   Not selected:    Today only (1 day)    Today and tomorrow (2 days)    3 days    5 days    7 days   Is there anything you'd like to add about your symptoms? Please limit your comments to the symptoms covered in this interview. If you include comments about other concerns, your provider may recommend that you be seen in person.    My sinus drainage is mostly watery but when it is phlegmy it's yellow and brown and almost hurts to blow my nose.   ----------   Medical history   Medical history data does not currently exist for this patient.

## 2024-04-29 ENCOUNTER — SPECIALTY PHARMACY (OUTPATIENT)
Dept: ENDOCRINOLOGY | Age: 34
End: 2024-04-29
Payer: COMMERCIAL

## 2024-04-29 NOTE — PROGRESS NOTES
Specialty Pharmacy Refill Coordination Note     Belem is a 33 y.o. female contacted today regarding refills of  3 specialty medication(s).    Reviewed and verified with patient:       Specialty medication(s) and dose(s) confirmed: yes    Refill Questions      Flowsheet Row Most Recent Value   Changes to allergies? No   Changes to medications? No   New conditions or infections since last clinic visit No   Unplanned office visit, urgent care, ED, or hospital admission in the last 4 weeks  No   How does patient/caregiver feel medication is working? Good   Financial problems or insurance changes  No   Since the previous refill, were any specialty medication doses or scheduled injections missed or delayed?  No   Does this patient require a clinical escalation to a pharmacist? No            Delivery Questions      Flowsheet Row Most Recent Value   Delivery method FedEx   Delivery address verified with patient/caregiver? Yes   Delivery address Home   Number of medications in delivery 3   Medication(s) being filled and delivered Azithromycin (Azithromycin), Methylprednisolone, Propranolol Hcl   Doses left of specialty medications 1 week   Copay verified? Yes   Copay amount 1.63   Copay form of payment Credit/debit on file                   Follow-up: 25 day(s)     Cee Laurent, Pharmacy Technician  Specialty Pharmacy Technician

## 2024-05-07 ENCOUNTER — SPECIALTY PHARMACY (OUTPATIENT)
Dept: ENDOCRINOLOGY | Age: 34
End: 2024-05-07
Payer: COMMERCIAL

## 2024-05-28 ENCOUNTER — SPECIALTY PHARMACY (OUTPATIENT)
Dept: ENDOCRINOLOGY | Age: 34
End: 2024-05-28
Payer: COMMERCIAL

## 2024-05-28 NOTE — PROGRESS NOTES
Specialty Pharmacy Refill Coordination Note     Belem is a 33 y.o. female contacted today regarding refills of  2 specialty medication(s).    Reviewed and verified with patient:       Specialty medication(s) and dose(s) confirmed: yes    Refill Questions      Flowsheet Row Most Recent Value   Changes to allergies? No   Changes to medications? No   New conditions or infections since last clinic visit No   Unplanned office visit, urgent care, ED, or hospital admission in the last 4 weeks  No   How does patient/caregiver feel medication is working? Good   Financial problems or insurance changes  No   Since the previous refill, were any specialty medication doses or scheduled injections missed or delayed?  No   Does this patient require a clinical escalation to a pharmacist? No            Delivery Questions      Flowsheet Row Most Recent Value   Delivery method [employer]   Delivery address verified with patient/caregiver? Yes   Delivery address Home   Number of medications in delivery 2   Medication(s) being filled and delivered Norethindrone (Progestin Contraceptives - Oral), Propranolol Hcl   Doses left of specialty medications 1 week   Copay verified? Yes   Copay amount 0   Copay form of payment No copayment ($0)                   Follow-up: 25 day(s)     Cee Laurent, Pharmacy Technician  Specialty Pharmacy Technician

## 2024-06-13 NOTE — PROGRESS NOTES
GYN ANNUAL EXAM     Chief Complaint   Patient presents with    Annual Exam     AE and last pap  normal       HPI    Belem is a 33 y.o. female who presents for annual well woman exam. She is a patient of Dr. Villa. She does report getting sensitive skin on her vulva the week before her menses. It typically goes away once she starts but is has happened a few times already this year.     OB History          2    Para   2    Term   2            AB        Living   2         SAB        IAB        Ectopic        Molar        Multiple   0    Live Births   2                SUBJECTIVE    MENSTRUAL & SEXUAL HEALTH  LMP: Patient's last menstrual period was 2024.  Menses regularity: regular every 28-30 days  Menses length: 7 days  Dysmenorrhea: none  Cyclic symptoms: none  Current contraception: oral progesterone-only contraceptive  Last pap: , Normal PAP  History of abnormal pap: no  History of STD: No  Family history of cancer: colon cancer       Family history of breast cancer: no  Performs SBE: performs monthly.  Incontinence: yes - occasionally but will think about pelvic floor physical therapy for it.   Dyspareunia: no    Past Medical History:   Diagnosis Date    Back pain     Bulging lumbar disc     last incident     Gestational hypertension     Headache     Placenta previa     first pregnancy    Tailbone injury     broken possibly twice 16 years old and 21 years old    Thyroid nodule     enlarged- benign biopsy       Past Surgical History:   Procedure Laterality Date     SECTION N/A 2021    Procedure:  SECTION PRIMARY;  Surgeon: Carol Cueva MD;  Location: [facility] LABOR DELIVERY;  Service: Obstetrics/Gynecology;  Laterality: N/A;     SECTION N/A 2023    Procedure:  SECTION REPEAT;  Surgeon: Joya Villa MD;  Location: [facility] LABOR DELIVERY;  Service: Obstetrics/Gynecology;  Laterality: N/A;         Current Outpatient  Medications:     albuterol sulfate  (90 Base) MCG/ACT inhaler, Inhale 2 puffs every 6 hours as needed for wheezing. If symptoms are severe, may use as often as every 4 hours., Disp: 6.7 g, Rfl: 0    norethindrone (MICRONOR) 0.35 MG tablet, Take 1 tablet by mouth Daily., Disp: 84 tablet, Rfl: 1    Oxymetazoline HCl (NASAL SPRAY SINUS NA), into the nostril(s) as directed by provider As Needed., Disp: , Rfl:     propranolol (INDERAL) 80 MG tablet, Take 1 tablet by mouth Daily., Disp: 30 tablet, Rfl: 2    Semaglutide-Weight Management (Wegovy) 0.5 MG/0.5ML solution auto-injector, Inject 0.5 mL under the skin into the appropriate area as directed 1 (One) Time Per Week., Disp: 2 mL, Rfl: 3    SUMAtriptan (IMITREX) 50 MG tablet, Take 1 tablet by mouth 1 (One) Time As Needed for Migraine. Take one tablet at onset of headache. May repeat dose one time in 2 hours if headache not relieved., Disp: 30 tablet, Rfl: 0    norethindrone (MICRONOR) 0.35 MG tablet, Take 1 tablet by mouth Daily., Disp: 84 tablet, Rfl: 3    No Known Allergies    Social History     Tobacco Use    Smoking status: Never     Passive exposure: Never    Smokeless tobacco: Never   Vaping Use    Vaping status: Never Used   Substance Use Topics    Alcohol use: No    Drug use: No       Family History   Problem Relation Age of Onset    Colon cancer Maternal Grandmother     Cancer Maternal Grandmother     Hypertension Maternal Grandfather     Heart disease Paternal Aunt     Depression Mother     OCD Mother     Neuropathy Mother     COPD Mother     Melanoma Mother     Diabetes Paternal Grandmother     Heart disease Paternal Grandmother     Skin cancer Father     Cancer Paternal Grandfather     Breast cancer Neg Hx     Ovarian cancer Neg Hx     Uterine cancer Neg Hx     Cystic fibrosis Neg Hx     Congenital heart disease Neg Hx        Review of Systems   Constitutional:  Negative for fatigue, unexpected weight gain and unexpected weight loss.  "  Gastrointestinal:  Negative for abdominal pain.   Genitourinary:  Negative for decreased libido, difficulty urinating, dyspareunia, dysuria, pelvic pain, pelvic pressure, urgency, urinary incontinence and vaginal discharge.   All other systems reviewed and are negative.      OBJECTIVE    /80   Ht 167.6 cm (65.98\")   Wt (!) 148 kg (326 lb)   LMP 05/22/2024   BMI 52.64 kg/m²     Physical Exam  Constitutional:       General: She is awake. She is not in acute distress.     Appearance: Normal appearance. She is well-developed and well-groomed. She is not ill-appearing.   Genitourinary:      Vulva, bladder and urethral meatus normal.      Right Labia: No rash (No rash appreciated on today's exam.), tenderness, lesions or skin changes.     Left Labia: No tenderness, lesions, skin changes or rash (No rash appreciated on today's exam.).     No labial fusion noted.      No inguinal adenopathy present in the right or left side.     No vaginal discharge, erythema, tenderness, bleeding, ulceration or granulation tissue.      No vaginal prolapse present.     No vaginal atrophy present.     No cervical discharge, friability, lesion, polyp, eversion or elongation.      Uterus is not enlarged, tender or prolapsed.      Pelvic exam was performed with patient in the lithotomy position.   Breasts:     Breasts are symmetrical.      Breasts are soft.     Right: Normal.      Left: Normal.   HENT:      Head: Normocephalic and atraumatic.   Eyes:      General: No scleral icterus.     Conjunctiva/sclera: Conjunctivae normal.   Cardiovascular:      Rate and Rhythm: Normal rate and regular rhythm.      Heart sounds: Normal heart sounds.   Pulmonary:      Effort: Pulmonary effort is normal.      Breath sounds: Normal breath sounds.   Abdominal:      Palpations: Abdomen is soft.      Hernia: There is no hernia in the left inguinal area or right inguinal area.   Musculoskeletal:         General: Normal range of motion.      Cervical " back: Normal range of motion.   Lymphadenopathy:      Lower Body: No right inguinal adenopathy. No left inguinal adenopathy.   Neurological:      Mental Status: She is alert.   Skin:     General: Skin is warm and dry.      Coloration: Skin is not jaundiced or pale.   Psychiatric:         Behavior: Behavior normal. Behavior is cooperative.   Vitals reviewed.         ASSESSMENT     Diagnoses and all orders for this visit:    1. Encounter for gynecological examination without abnormal finding (Primary)  -     IGP, Apt HPV,rfx 16 / 18,45    2. Encounter for surveillance of contraceptive pills  -     norethindrone (MICRONOR) 0.35 MG tablet; Take 1 tablet by mouth Daily.  Dispense: 84 tablet; Refill: 3    3. Rash         PLAN   WELL WOMAN EXAM: Pap smear collected today. Recommend MVI daily.    CONTRACEPTION: oral progesterone-only contraceptive refilled x 1 year. .   SMOKING STATUS: non smoker.  BREAST HEALTH: Encouraged annual mammogram screening starting at age 40. Instructed on how to perform SBE. Encouraged breast health self awareness.  EXERCISE: Encouraged 150 minutes of exercise per week if not medially contraindicated.   BMI: Body mass index is 52.64 kg/m².     Return in about 1 year (around 6/14/2025) for annual physical.    I spent 15 minutes caring for Belem on this date of service. This time includes time spent by me in the following activities: preparing for the visit, reviewing tests, obtaining and/or reviewing a separately obtained history, performing a medically appropriate examination and/or evaluation, counseling and educating the patient/family/caregiver, ordering medications, tests, or procedures, referring and communicating with other health care professionals, documenting information in the medical record, independently interpreting results and communicating that information with the patient/family/caregiver, and care coordination.    Jie Ramos CNM  6/14/2024  13:27 EDT

## 2024-06-14 ENCOUNTER — OFFICE VISIT (OUTPATIENT)
Dept: FAMILY MEDICINE CLINIC | Facility: CLINIC | Age: 34
End: 2024-06-14
Payer: COMMERCIAL

## 2024-06-14 ENCOUNTER — OFFICE VISIT (OUTPATIENT)
Dept: OBSTETRICS AND GYNECOLOGY | Facility: CLINIC | Age: 34
End: 2024-06-14
Payer: COMMERCIAL

## 2024-06-14 VITALS
BODY MASS INDEX: 47.09 KG/M2 | HEIGHT: 66 IN | SYSTOLIC BLOOD PRESSURE: 129 MMHG | DIASTOLIC BLOOD PRESSURE: 80 MMHG | WEIGHT: 293 LBS

## 2024-06-14 VITALS
WEIGHT: 293 LBS | HEIGHT: 66 IN | SYSTOLIC BLOOD PRESSURE: 120 MMHG | TEMPERATURE: 96.6 F | OXYGEN SATURATION: 98 % | RESPIRATION RATE: 14 BRPM | HEART RATE: 81 BPM | DIASTOLIC BLOOD PRESSURE: 62 MMHG | BODY MASS INDEX: 47.09 KG/M2

## 2024-06-14 DIAGNOSIS — M72.2 PLANTAR FASCIITIS OF LEFT FOOT: Primary | ICD-10-CM

## 2024-06-14 DIAGNOSIS — R21 RASH: ICD-10-CM

## 2024-06-14 DIAGNOSIS — Z30.41 ENCOUNTER FOR SURVEILLANCE OF CONTRACEPTIVE PILLS: ICD-10-CM

## 2024-06-14 DIAGNOSIS — G43.109 MIGRAINE WITH AURA AND WITHOUT STATUS MIGRAINOSUS, NOT INTRACTABLE: ICD-10-CM

## 2024-06-14 DIAGNOSIS — Z01.419 ENCOUNTER FOR GYNECOLOGICAL EXAMINATION WITHOUT ABNORMAL FINDING: Primary | ICD-10-CM

## 2024-06-14 PROCEDURE — 99213 OFFICE O/P EST LOW 20 MIN: CPT | Performed by: INTERNAL MEDICINE

## 2024-06-14 RX ORDER — PROPRANOLOL HYDROCHLORIDE 80 MG/1
80 TABLET ORAL DAILY
Qty: 30 TABLET | Refills: 2 | Status: SHIPPED | OUTPATIENT
Start: 2024-06-14

## 2024-06-14 RX ORDER — ACETAMINOPHEN AND CODEINE PHOSPHATE 120; 12 MG/5ML; MG/5ML
1 SOLUTION ORAL DAILY
Qty: 84 TABLET | Refills: 3 | Status: SHIPPED | OUTPATIENT
Start: 2024-06-14 | End: 2025-06-14

## 2024-06-14 NOTE — ASSESSMENT & PLAN NOTE
Headaches are stable.    Plan:  Continue same medication/s without change.   Propanolol 80 mg daily for prophylaxis      General Treatment Goals:   symptom prevention  minimize work absence  minimizing limitation in activity  prevention of exacerbations  decrease use of ER/inpatient care  minimization of adverse effects of treatment    Followup at the next regular appointment

## 2024-06-14 NOTE — ASSESSMENT & PLAN NOTE
Still symptomatic despite NSAIDs therapy & exercises at home  Will refer to ortho for corticosteroid injection shot

## 2024-06-14 NOTE — PROGRESS NOTES
Chief Complaint   Patient presents with    Headache     Discuss h/a meds     Med Refill    Foot Pain     C/o lt foot heel pain since    History of Present Illness:  Patient is here today for regular follow up & medication refill. She has not had migraine headaches since starting propranolol for prophylaxis.  Tolerating propranolol without any significant side effect.  Requesting refill today.    She reports chronic left heel pain for the past few months.  Pain is located in the middle of the foot with radiating to the medial side of the foot, rated 7/10 in intensity, usually worse in the mornings or after prolonged immobilization and gets better with activity.  She has been doing some heel exercises and ibuprofen/Tylenol as needed without much relief.    PMH:   Outpatient Medications Prior to Visit   Medication Sig Dispense Refill    albuterol sulfate  (90 Base) MCG/ACT inhaler Inhale 2 puffs every 6 hours as needed for wheezing. If symptoms are severe, may use as often as every 4 hours. 6.7 g 0    norethindrone (MICRONOR) 0.35 MG tablet Take 1 tablet by mouth Daily. 84 tablet 1    norethindrone (MICRONOR) 0.35 MG tablet Take 1 tablet by mouth Daily. 84 tablet 3    Oxymetazoline HCl (NASAL SPRAY SINUS NA) into the nostril(s) as directed by provider As Needed.      Semaglutide-Weight Management (Wegovy) 0.5 MG/0.5ML solution auto-injector Inject 0.5 mL under the skin into the appropriate area as directed 1 (One) Time Per Week. 2 mL 3    SUMAtriptan (IMITREX) 50 MG tablet Take 1 tablet by mouth 1 (One) Time As Needed for Migraine. Take one tablet at onset of headache. May repeat dose one time in 2 hours if headache not relieved. 30 tablet 0    propranolol (INDERAL) 80 MG tablet Take 1 tablet by mouth Daily. 30 tablet 2     No facility-administered medications prior to visit.      No Known Allergies  Past Surgical History:   Procedure Laterality Date     SECTION N/A 2021    Procedure:  " SECTION PRIMARY;  Surgeon: Carol Cueva MD;  Location: [facility] LABOR DELIVERY;  Service: Obstetrics/Gynecology;  Laterality: N/A;     SECTION N/A 2023    Procedure:  SECTION REPEAT;  Surgeon: Joya Villa MD;  Location: [facility] LABOR DELIVERY;  Service: Obstetrics/Gynecology;  Laterality: N/A;     family history includes COPD in her mother; Cancer in her maternal grandmother and paternal grandfather; Colon cancer in her maternal grandmother; Depression in her mother; Diabetes in her paternal grandmother; Heart disease in her paternal aunt and paternal grandmother; Hypertension in her maternal grandfather; Melanoma in her mother; Neuropathy in her mother; OCD in her mother; Skin cancer in her father.   reports that she has never smoked. She has never been exposed to tobacco smoke. She has never used smokeless tobacco. She reports that she does not drink alcohol and does not use drugs.     /62   Pulse 81   Temp 96.6 °F (35.9 °C) (Temporal)   Resp 14   Ht 167.6 cm (65.98\")   Wt (!) 148 kg (327 lb 3.2 oz)   LMP 2024 (Approximate)   SpO2 98%   BMI 52.84 kg/m²   Physical Exam  Constitutional:       Appearance: Normal appearance.   HENT:      Head: Normocephalic and atraumatic.   Musculoskeletal:        Feet:    Feet:      Comments: Tenderness on palpation of the mid foot. Callus formation on the medial side of the mid foot   Neurological:      Mental Status: She is alert.                   Diagnoses and all orders for this visit:    1. Plantar fasciitis of left foot (Primary)  Assessment & Plan:  Still symptomatic despite NSAIDs therapy & exercises at home  Will refer to ortho for corticosteroid injection shot    Orders:  -     Ambulatory Referral to Orthopedic Surgery    2. Migraine with aura and without status migrainosus, not intractable  Assessment & Plan:  Headaches are stable.    Plan:  Continue same medication/s without change.   Propanolol 80 mg daily for " prophylaxis      General Treatment Goals:   symptom prevention  minimize work absence  minimizing limitation in activity  prevention of exacerbations  decrease use of ER/inpatient care  minimization of adverse effects of treatment    Followup at the next regular appointment                Orders:  -     propranolol (INDERAL) 80 MG tablet; Take 1 tablet by mouth Daily.  Dispense: 30 tablet; Refill: 2             Return for Annual physical.

## 2024-06-18 LAB
CYTOLOGIST CVX/VAG CYTO: NORMAL
CYTOLOGY CVX/VAG DOC CYTO: NORMAL
CYTOLOGY CVX/VAG DOC THIN PREP: NORMAL
DX ICD CODE: NORMAL
HPV I/H RISK 4 DNA CVX QL PROBE+SIG AMP: NEGATIVE
Lab: NORMAL
OTHER STN SPEC: NORMAL
STAT OF ADQ CVX/VAG CYTO-IMP: NORMAL

## 2024-06-19 ENCOUNTER — SPECIALTY PHARMACY (OUTPATIENT)
Dept: ENDOCRINOLOGY | Age: 34
End: 2024-06-19
Payer: COMMERCIAL

## 2024-06-19 NOTE — PROGRESS NOTES
Specialty Pharmacy Refill Coordination Note     Belem is a 33 y.o. female contacted today regarding refills of  3 specialty medication(s).    Reviewed and verified with patient:       Specialty medication(s) and dose(s) confirmed: yes    Refill Questions      Flowsheet Row Most Recent Value   Changes to allergies? No   Changes to medications? No   New conditions or infections since last clinic visit No   Unplanned office visit, urgent care, ED, or hospital admission in the last 4 weeks  No   How does patient/caregiver feel medication is working? Good   Financial problems or insurance changes  No   Since the previous refill, were any specialty medication doses or scheduled injections missed or delayed?  No   Does this patient require a clinical escalation to a pharmacist? No            Delivery Questions      Flowsheet Row Most Recent Value   Delivery method FedEx   Delivery address verified with patient/caregiver? Yes   Delivery address Home   Number of medications in delivery 3   Medication(s) being filled and delivered Norethindrone (Progestin Contraceptives - Oral), Propranolol Hcl, Semaglutide-Weight Management   Doses left of specialty medications 1 week   Copay verified? Yes   Copay amount 161.36   Copay form of payment HSA/FSA on file                   Follow-up: 25 day(s)     Cee Laurent, Pharmacy Technician  Specialty Pharmacy Technician

## 2024-07-01 DIAGNOSIS — Z00.00 ANNUAL PHYSICAL EXAM: Primary | ICD-10-CM

## 2024-07-01 DIAGNOSIS — R73.9 HYPERGLYCEMIA: ICD-10-CM

## 2024-07-02 LAB
ALBUMIN SERPL-MCNC: 4.5 G/DL (ref 3.9–4.9)
ALP SERPL-CCNC: 137 IU/L (ref 44–121)
ALT SERPL-CCNC: 12 IU/L (ref 0–32)
APPEARANCE UR: CLEAR
AST SERPL-CCNC: 14 IU/L (ref 0–40)
BASOPHILS # BLD AUTO: 0.1 X10E3/UL (ref 0–0.2)
BASOPHILS NFR BLD AUTO: 1 %
BILIRUB SERPL-MCNC: 0.4 MG/DL (ref 0–1.2)
BILIRUB UR QL STRIP: NEGATIVE
BUN SERPL-MCNC: 12 MG/DL (ref 6–20)
BUN/CREAT SERPL: 19 (ref 9–23)
CALCIUM SERPL-MCNC: 9.4 MG/DL (ref 8.7–10.2)
CHLORIDE SERPL-SCNC: 104 MMOL/L (ref 96–106)
CHOLEST SERPL-MCNC: 192 MG/DL (ref 100–199)
CO2 SERPL-SCNC: 24 MMOL/L (ref 20–29)
COLOR UR: YELLOW
CREAT SERPL-MCNC: 0.62 MG/DL (ref 0.57–1)
EGFRCR SERPLBLD CKD-EPI 2021: 119 ML/MIN/1.73
EOSINOPHIL # BLD AUTO: 0.2 X10E3/UL (ref 0–0.4)
EOSINOPHIL NFR BLD AUTO: 2 %
ERYTHROCYTE [DISTWIDTH] IN BLOOD BY AUTOMATED COUNT: 12.3 % (ref 11.7–15.4)
GLOBULIN SER CALC-MCNC: 2.5 G/DL (ref 1.5–4.5)
GLUCOSE SERPL-MCNC: 89 MG/DL (ref 70–99)
GLUCOSE UR QL STRIP: NEGATIVE
HCT VFR BLD AUTO: 44 % (ref 34–46.6)
HDLC SERPL-MCNC: 40 MG/DL
HGB BLD-MCNC: 14.8 G/DL (ref 11.1–15.9)
HGB UR QL STRIP: NEGATIVE
IMM GRANULOCYTES # BLD AUTO: 0 X10E3/UL (ref 0–0.1)
IMM GRANULOCYTES NFR BLD AUTO: 0 %
KETONES UR QL STRIP: NEGATIVE
LDLC SERPL CALC-MCNC: 128 MG/DL (ref 0–99)
LDLC/HDLC SERPL: 3.2 RATIO (ref 0–3.2)
LEUKOCYTE ESTERASE UR QL STRIP: NEGATIVE
LYMPHOCYTES # BLD AUTO: 2.1 X10E3/UL (ref 0.7–3.1)
LYMPHOCYTES NFR BLD AUTO: 28 %
MCH RBC QN AUTO: 32.7 PG (ref 26.6–33)
MCHC RBC AUTO-ENTMCNC: 33.6 G/DL (ref 31.5–35.7)
MCV RBC AUTO: 97 FL (ref 79–97)
MICRO URNS: NORMAL
MONOCYTES # BLD AUTO: 0.6 X10E3/UL (ref 0.1–0.9)
MONOCYTES NFR BLD AUTO: 7 %
NEUTROPHILS # BLD AUTO: 4.7 X10E3/UL (ref 1.4–7)
NEUTROPHILS NFR BLD AUTO: 62 %
NITRITE UR QL STRIP: NEGATIVE
PH UR STRIP: 5.5 [PH] (ref 5–7.5)
PLATELET # BLD AUTO: 315 X10E3/UL (ref 150–450)
POTASSIUM SERPL-SCNC: 4.3 MMOL/L (ref 3.5–5.2)
PROT SERPL-MCNC: 7 G/DL (ref 6–8.5)
PROT UR QL STRIP: NEGATIVE
RBC # BLD AUTO: 4.52 X10E6/UL (ref 3.77–5.28)
SODIUM SERPL-SCNC: 140 MMOL/L (ref 134–144)
SP GR UR STRIP: 1.02 (ref 1–1.03)
TRIGL SERPL-MCNC: 134 MG/DL (ref 0–149)
TSH SERPL DL<=0.005 MIU/L-ACNC: 0.95 UIU/ML (ref 0.45–4.5)
UROBILINOGEN UR STRIP-MCNC: 0.2 MG/DL (ref 0.2–1)
VLDLC SERPL CALC-MCNC: 24 MG/DL (ref 5–40)
WBC # BLD AUTO: 7.6 X10E3/UL (ref 3.4–10.8)

## 2024-07-08 ENCOUNTER — OFFICE VISIT (OUTPATIENT)
Dept: FAMILY MEDICINE CLINIC | Facility: CLINIC | Age: 34
End: 2024-07-08
Payer: COMMERCIAL

## 2024-07-08 VITALS
DIASTOLIC BLOOD PRESSURE: 74 MMHG | OXYGEN SATURATION: 97 % | HEIGHT: 66 IN | HEART RATE: 77 BPM | WEIGHT: 293 LBS | BODY MASS INDEX: 47.09 KG/M2 | SYSTOLIC BLOOD PRESSURE: 106 MMHG | TEMPERATURE: 98.4 F | RESPIRATION RATE: 18 BRPM

## 2024-07-08 DIAGNOSIS — T78.40XA ALLERGY, INITIAL ENCOUNTER: ICD-10-CM

## 2024-07-08 DIAGNOSIS — Z00.00 ANNUAL PHYSICAL EXAM: Primary | ICD-10-CM

## 2024-07-08 PROCEDURE — 99395 PREV VISIT EST AGE 18-39: CPT | Performed by: INTERNAL MEDICINE

## 2024-07-08 RX ORDER — MONTELUKAST SODIUM 10 MG/1
10 TABLET ORAL NIGHTLY
Qty: 30 TABLET | Refills: 0 | Status: SHIPPED | OUTPATIENT
Start: 2024-07-08

## 2024-07-08 NOTE — ASSESSMENT & PLAN NOTE
Lab review indicate normal CBC, normal kidney function, slightly elevated ALP, normal AST and ALT, electrolyte WNL, normal lipid panel, UA unremarkable.  Recommended COVID vaccination  Up-to-date with Pap smear.  Discussed the importance of maintaining a healthy weight and getting regular exercise.  Educated patient on the benefits of healthy diet.  Advise follow-up annually for wellness exams.

## 2024-07-08 NOTE — PROGRESS NOTES
Subjective   Belem Mcknight is a 33 y.o. female who presents for annual female wellness exam.  Chief Complaint   Patient presents with    Annual Exam     Pt here for physical exam and ready for her dose increase on Wegovy, no other concerns    Hyperglycemia   Patient is a 33-year-old female who presents today for annual physical exam.  She reports history of allergy reactions to unknown and weird circumstances like drinking alcohol, exposure to heat.  She begins to get itchy all over and gets unexplained point tenderness in the joints and hip.  She becomes highly sensitive to pain.  Tried Zyrtec and Claritin  for a while in the past without much relief at some point.    Menstrual History: regular  Pregnancy History: two pregnancies  Sexual History: sexual active   Contraception: non-estrogen OCPs  Diet: tries to eat more healthy  Exercise: not as much  Feels safe and denies any form of abuse  Last dental exam: very long ago  Eye exam: 3 years ago      Mammogram: not due for age  Pap Smear: up to date  Bone Density: not due for age  Colon Cancer Screening: not due for age    Immunization History   Administered Date(s) Administered    COVID-19 (MODERNA) 1st,2nd,3rd Dose Monovalent 2021    Tdap 2020       The following portions of the patient's history were reviewed and updated as appropriate: allergies, current medications, past family history, past medical history, past social history, past surgical history and problem list.    Past Medical History:   Diagnosis Date    Back pain     Bulging lumbar disc     last incident     Gestational hypertension     Headache     Placenta previa     first pregnancy    Tailbone injury     broken possibly twice 16 years old and 21 years old    Thyroid nodule     enlarged- benign biopsy       Past Surgical History:   Procedure Laterality Date     SECTION N/A 2021    Procedure:  SECTION PRIMARY;  Surgeon: Carol Cueva MD;  Location:   [facility];  Service: Obstetrics/Gynecology;  Laterality: N/A;     SECTION N/A 2023    Procedure:  SECTION REPEAT;  Surgeon: Joya Villa MD;  Location:  [facility];  Service: Obstetrics/Gynecology;  Laterality: N/A;       Family History   Problem Relation Age of Onset    Colon cancer Maternal Grandmother     Cancer Maternal Grandmother     Hypertension Maternal Grandfather     Heart disease Paternal Aunt     Depression Mother     OCD Mother     Neuropathy Mother     COPD Mother     Melanoma Mother     Diabetes Paternal Grandmother     Heart disease Paternal Grandmother     Skin cancer Father     Cancer Paternal Grandfather     Breast cancer Neg Hx     Ovarian cancer Neg Hx     Uterine cancer Neg Hx     Cystic fibrosis Neg Hx     Congenital heart disease Neg Hx        Social History     Socioeconomic History    Marital status:    Tobacco Use    Smoking status: Never     Passive exposure: Never    Smokeless tobacco: Never   Vaping Use    Vaping status: Never Used   Substance and Sexual Activity    Alcohol use: No    Drug use: No    Sexual activity: Yes     Partners: Male     Birth control/protection: None     Comment: sig other = PREET         Objective   Vitals:    24 1413   BP: 106/74   Pulse: 77   Resp: 18   Temp: 98.4 °F (36.9 °C)   SpO2: 97%     Body mass index is 53.05 kg/m².  Physical Exam  Constitutional:       Appearance: Normal appearance.   HENT:      Head: Normocephalic and atraumatic.   Cardiovascular:      Rate and Rhythm: Normal rate and regular rhythm.      Heart sounds: Normal heart sounds.   Pulmonary:      Breath sounds: Normal breath sounds.   Abdominal:      General: Bowel sounds are normal. There is no distension.      Palpations: Abdomen is soft.      Tenderness: There is no abdominal tenderness.   Skin:     General: Skin is warm.   Neurological:      Mental Status: She is alert and oriented to person, place, and time.   Psychiatric:          Mood and Affect: Mood normal.         The following data was reviewed by: Leelee Keenan MD on 07/08/2024:  Common labs          12/14/2023    09:11 7/1/2024    09:40   Common Labs   Glucose 88  89    BUN 9  12    Creatinine 0.62  0.62    Sodium 140  140    Potassium 4.2  4.3    Chloride 102  104    Calcium 9.6  9.4    Total Protein 6.9  7.0    Albumin 4.6  4.5    Total Bilirubin 0.4  0.4    Alkaline Phosphatase 119  137    AST (SGOT) 15  14    ALT (SGPT) 24  12    WBC  7.6    Hemoglobin  14.8    Hematocrit  44.0    Platelets  315    Total Cholesterol  192    Triglycerides  134    HDL Cholesterol  40    LDL Cholesterol   128    Hemoglobin A1C 5.50       TSH          12/14/2023    09:11 7/1/2024    09:40   TSH   TSH 0.631  0.948             Assessment & Plan   Diagnoses and all orders for this visit:    1. Annual physical exam (Primary)  Assessment & Plan:  Lab review indicate normal CBC, normal kidney function, slightly elevated ALP, normal AST and ALT, electrolyte WNL, normal lipid panel, UA unremarkable.  Recommended COVID vaccination  Up-to-date with Pap smear.  Discussed the importance of maintaining a healthy weight and getting regular exercise.  Educated patient on the benefits of healthy diet.  Advise follow-up annually for wellness exams.      2. Allergy, initial encounter  -     Ambulatory Referral to Allergy  -     montelukast (Singulair) 10 MG tablet; Take 1 tablet by mouth Every Night.  Dispense: 30 tablet; Refill: 0    H/o allergic reactions. Refer to allergy clinic for allergy testing.                 Return in about 1 year (around 7/8/2025) for Annual physical.

## 2024-07-17 ENCOUNTER — SPECIALTY PHARMACY (OUTPATIENT)
Dept: ENDOCRINOLOGY | Age: 34
End: 2024-07-17
Payer: COMMERCIAL

## 2024-07-17 DIAGNOSIS — R74.8 ABNORMAL LIVER ENZYMES: Primary | ICD-10-CM

## 2024-07-17 RX ORDER — SEMAGLUTIDE 1 MG/.5ML
1 INJECTION, SOLUTION SUBCUTANEOUS WEEKLY
Qty: 2 ML | Refills: 1 | Status: SHIPPED | OUTPATIENT
Start: 2024-07-17

## 2024-07-17 NOTE — PROGRESS NOTES
Specialty Pharmacy Patient Management Program  Endocrinology Reassessment     Belem Mcknight was referred by an Endocrinology provider to the Endocrinology Patient Management program offered by [facility] for Weight Management. A follow-up outreach was conducted, including assessment of continued therapy appropriateness, medication adherence, and side effect incidence and management for Wegovy.    Changes to Insurance Coverage or Financial Support  No changes    Relevant Past Medical History and Comorbidities  Relevant medical history and concomitant health conditions were discussed with the patient. The patient's chart has been reviewed for relevant past medical history and comorbid health conditions and updated as necessary.   Past Medical History:   Diagnosis Date    Back pain     Bulging lumbar disc     last incident 2020    Gestational hypertension     Headache     Placenta previa 2021    first pregnancy    Tailbone injury     broken possibly twice 16 years old and 21 years old    Thyroid nodule 2021    enlarged- benign biopsy     Social History     Socioeconomic History    Marital status:    Tobacco Use    Smoking status: Never     Passive exposure: Never    Smokeless tobacco: Never   Vaping Use    Vaping status: Never Used   Substance and Sexual Activity    Alcohol use: No    Drug use: No    Sexual activity: Yes     Partners: Male     Birth control/protection: None     Comment: sig other = PREET     Problem list reviewed by Miley Simpson PharmD on 7/17/2024 at  9:37 AM    Hospitalizations and Urgent Care Since Last Assessment  ED Visits, Admissions, or Hospitalizations: none  Urgent Office Visits: none    Allergies  Known allergies and reactions were discussed with the patient. The patient's chart has been reviewed for allergy information and updated as necessary.   No Known Allergies  Allergies reviewed by Miley Simpson PharmD on 7/17/2024 at  9:36 AM    Relevant  Laboratory Values  Relevant laboratory values were discussed with the patient. The following specialty medication dose adjustment(s) are recommended: n/a  A1C Last 3 Results          12/14/2023    09:11   HGBA1C Last 3 Results   Hemoglobin A1C 5.50      Lab Results   Component Value Date    HGBA1C 5.50 12/14/2023     Lab Results   Component Value Date    GLUCOSE 89 07/01/2024    CALCIUM 9.4 07/01/2024     07/01/2024    K 4.3 07/01/2024    CO2 24 07/01/2024     07/01/2024    BUN 12 07/01/2024    CREATININE 0.62 07/01/2024    EGFRIFAFRI 130 02/27/2019    EGFRIFNONA 107 02/27/2019    BCR 19 07/01/2024    ANIONGAP 10.0 11/18/2022     Lab Results   Component Value Date    CHLPL 192 07/01/2024    TRIG 134 07/01/2024    HDL 40 07/01/2024     (H) 07/01/2024       Current Medication List  This medication list has been reviewed with the patient and evaluated for any interactions or necessary modifications/recommendations, and updated to include all prescription medications, OTC medications, and supplements the patient is currently taking.  This list reflects what is contained in the patient's profile, which has also been marked as reviewed to communicate to other providers it is the most up to date version of the patient's current medication therapy.     Current Outpatient Medications:     albuterol sulfate  (90 Base) MCG/ACT inhaler, Inhale 2 puffs every 6 hours as needed for wheezing. If symptoms are severe, may use as often as every 4 hours., Disp: 6.7 g, Rfl: 0    montelukast (Singulair) 10 MG tablet, Take 1 tablet by mouth Every Night., Disp: 30 tablet, Rfl: 0    norethindrone (MICRONOR) 0.35 MG tablet, Take 1 tablet by mouth Daily., Disp: 84 tablet, Rfl: 1    Oxymetazoline HCl (NASAL SPRAY SINUS NA), into the nostril(s) as directed by provider As Needed., Disp: , Rfl:     propranolol (INDERAL) 80 MG tablet, Take 1 tablet by mouth Daily., Disp: 30 tablet, Rfl: 2    SUMAtriptan (IMITREX) 50 MG  tablet, Take 1 tablet by mouth 1 (One) Time As Needed for Migraine. Take one tablet at onset of headache. May repeat dose one time in 2 hours if headache not relieved., Disp: 30 tablet, Rfl: 0    norethindrone (MICRONOR) 0.35 MG tablet, Take 1 tablet by mouth Daily., Disp: 84 tablet, Rfl: 3    Semaglutide-Weight Management (Wegovy) 1 MG/0.5ML solution auto-injector, Inject 0.5 mL under the skin into the appropriate area as directed 1 (One) Time Per Week., Disp: 2 mL, Rfl: 1    Medicines reviewed by Miley Simpson, PharmD on 7/17/2024 at  9:36 AM    Drug Interactions  none    Recommended Medications Assessment  Aspirin: Not Taking Currently  Statin: Not Taking Currently  ACEi/ARB: Not Taking Currently    Adverse Drug Reactions  Medication tolerability: Tolerating with no to minimal ADRs  Medication plan: Continue therapy with normal follow-up  Plan for ADR Management: n/a    Adherence, Self-Administration, and Current Therapy Problems  Adherence related to the patient's specialty therapy was discussed with the patient. The Adherence segment of this outreach has been reviewed and updated.     Adherence Questions  Linked Medication(s) Assessed: Semaglutide-Weight Management, Norethindrone (Progestin Contraceptives - Oral)  On average, how many doses/injections does the patient miss per month?: 1  What are the identified reasons for non-adherence or missed doses? : no problems identified  What is the estimated medication adherence level?: %  Based on the patient/caregiver response and refill history, does this patient require an MTP to track adherence improvements?: no    Additional Barriers to Patient Self-Administration: none  Methods for Supporting Patient Self-Administration: n/a    Open Medication Therapy Problems  No medication therapy recommendations to display    Goals of Therapy  Goals related to the patient's specialty therapy were discussed with the patient. The Patient Goals segment of this outreach has  been reviewed and updated.   Goals Addressed Today        Specialty Pharmacy General Goal      Lose 5% of body weight     12/14/23 = 321 lbs  06/07/23 = 332 lbs  7/8/24 = 328 lbs      Increasing wegovy dose from 0.5 mg to 1 mg. Prescription sent to pharmacy 7/17/24.              Quality of Life Assessment   Quality of Life related to the patient's enrollment in the patient management program and services provided was discussed with the patient. The QOL segment of this outreach has been reviewed and updated.  Quality of Life Improvement Scale: 8-Moderately better    Reassessment Plan & Follow-Up  1. Medication Therapy Changes: Patient is tolerating wegovy 0.5 mg weekly. Increase to wegovy 1 mg weekly.   2. Related Plans, Therapy Recommendations, or Issues to Be Addressed: no issues  3. Pharmacist to perform regular assessments no more than (6) months from the previous assessment.  4. Care Coordinator to set up future refill outreaches, coordinate prescription delivery, and escalate clinical questions to pharmacist.    Attestation  Therapeutic appropriateness: Appropriate   I attest the patient was actively involved in and has agreed to the above plan of care.  If the prescribed therapy is at any point deemed not appropriate based on the current or future assessments, a consultation will be initiated with the patient's specialty care provider to determine the best course of action. The revised plan of therapy will be documented along with any required assessments and/or additional patient education provided.     Miley Simpson, Safia  Clinical Specialty Pharmacist, Endocrinology  7/17/2024  10:33 EDT

## 2024-07-17 NOTE — TELEPHONE ENCOUNTER
Specialty Pharmacy Patient Management Program  Prescription Refill Request     Patient currently fills medications at  Pharmacy. Needing refill(s) on the following:      Requested Prescriptions     Pending Prescriptions Disp Refills    Semaglutide-Weight Management (Wegovy) 1 MG/0.5ML solution auto-injector 2 mL 1     Sig: Inject 0.5 mL under the skin into the appropriate area as directed 1 (One) Time Per Week.       Patient reports that she is tolerating wegovy 0.5 mg well and denies any issues or side effects. Pending prescription for 1 mg at this time.    Last visit: 12/14/2023  Next visit: none scheduled    Pended for Dr. Nokhbehzaeim to review, and approve if appropriate.     Miley Simpson, PharmD  Clinical Specialty Pharmacist, Endocrinology  7/17/2024  09:42 EDT

## 2024-07-24 ENCOUNTER — SPECIALTY PHARMACY (OUTPATIENT)
Dept: ENDOCRINOLOGY | Age: 34
End: 2024-07-24
Payer: COMMERCIAL

## 2024-07-24 NOTE — PROGRESS NOTES
Specialty Pharmacy Refill Coordination Note     Belem is a 33 y.o. female contacted today regarding refills of  1 specialty medication(s).    Reviewed and verified with patient:       Specialty medication(s) and dose(s) confirmed: yes    Refill Questions      Flowsheet Row Most Recent Value   Changes to allergies? No   Changes to medications? No   New conditions or infections since last clinic visit No   Unplanned office visit, urgent care, ED, or hospital admission in the last 4 weeks  No   How does patient/caregiver feel medication is working? Good   Financial problems or insurance changes  No   Since the previous refill, were any specialty medication doses or scheduled injections missed or delayed?  No   Does this patient require a clinical escalation to a pharmacist? No            Delivery Questions      Flowsheet Row Most Recent Value   Delivery method Beeline   Delivery address verified with patient/caregiver? Yes   Delivery address Home   Number of medications in delivery 1   Medication(s) being filled and delivered Semaglutide-Weight Management   Doses left of specialty medications 1 week   Copay verified? Yes   Copay amount 161   Copay form of payment Credit/debit on file   Ship Date 07/31   Delivery Date 08/01   Signature Required No                   Follow-up: 25 day(s)     Cee Laurent, Pharmacy Technician  Specialty Pharmacy Technician

## 2024-07-30 ENCOUNTER — SPECIALTY PHARMACY (OUTPATIENT)
Dept: ENDOCRINOLOGY | Age: 34
End: 2024-07-30
Payer: COMMERCIAL

## 2024-07-30 NOTE — PROGRESS NOTES
Specialty Pharmacy Refill Coordination Note     Belem is a 33 y.o. female contacted today regarding refills of  2 specialty medication(s).    Reviewed and verified with patient:       Specialty medication(s) and dose(s) confirmed: yes    Refill Questions      Flowsheet Row Most Recent Value   Changes to allergies? No   Changes to medications? No   New conditions or infections since last clinic visit No   Unplanned office visit, urgent care, ED, or hospital admission in the last 4 weeks  No   How does patient/caregiver feel medication is working? Good   Financial problems or insurance changes  No   Since the previous refill, were any specialty medication doses or scheduled injections missed or delayed?  No   Does this patient require a clinical escalation to a pharmacist? No            Delivery Questions      Flowsheet Row Most Recent Value   Delivery method Beeline   Delivery address verified with patient/caregiver? Yes   Delivery address Home   Number of medications in delivery 1   Medication(s) being filled and delivered Propranolol Hcl, Norethindrone (Progestin Contraceptives - Oral)   Doses left of specialty medications 1 week   Copay verified? Yes   Copay amount $0   Copay form of payment No copayment ($0)   Ship Date 07/31   Delivery Date 08/01   Signature Required No                   Follow-up: 25 day(s)     Cee Laurent, Pharmacy Technician  Specialty Pharmacy Technician

## 2024-08-07 ENCOUNTER — E-VISIT (OUTPATIENT)
Dept: FAMILY MEDICINE CLINIC | Facility: TELEHEALTH | Age: 34
End: 2024-08-07
Payer: COMMERCIAL

## 2024-08-07 RX ORDER — FLUCONAZOLE 150 MG/1
TABLET ORAL
Start: 2024-08-07

## 2024-08-07 RX ORDER — PHENAZOPYRIDINE HYDROCHLORIDE 200 MG/1
200 TABLET, FILM COATED ORAL 3 TIMES DAILY PRN
Start: 2024-08-07 | End: 2024-08-09

## 2024-08-07 RX ORDER — NITROFURANTOIN 25; 75 MG/1; MG/1
100 CAPSULE ORAL 2 TIMES DAILY
Start: 2024-08-07 | End: 2024-08-12

## 2024-08-07 NOTE — E-VISIT TREATED
Date: 2024 09:58:51  Clinician: Khalida Choi  Clinician NPI: [id number]  Patient: Belem Mcknight  Patient : 1990  Patient Address: [address] [place], [place] [address]  Patient Phone: [phone]  Visit Protocol: UTI  Patient Summary:  Belem is a 33 year old ( : 1990 ) female who initiated a visit for a presumed bladder infection.    The symptoms started 4-6 days ago and consist of dysuria, foul-smelling urine, urinary incontinence, urinary frequency, feeling   as if the bladder is never empty, and urgency.   Symptom details   Urine color: Yellow    Denied symptoms include nausea, abdominal pain, vaginal discharge, vomiting, chills, flank pain, and vaginal itching. Belem does not feel feverish.   Belem has not   used any over-the-counter medications or home remedies to relieve the current symptoms.  Precipitating events  Belem denies having a sexually transmitted disease.  Pertinent medical history  Belem typically gets a yeast infection when taking   antibiotics. Belem has successfully used non-prescription therapy to treat previous yeast infections.   Belem has a history of kidney stones. The last episode was more than 6 months ago.   Belem has not experienced problems or side effects with any of   the common antibiotics used to treat bladder infections.   Belem does not have a history of bladder infections. Belem has not had a procedure or surgery done to the urinary tract. Belem has not been diagnosed with advanced kidney disease.   Belem has not   used a catheter and denies being a patient in a hospital or nursing home in the past 2 weeks. Belem does not have diabetes. Immunosuppressive conditions (e.g., chemotherapy, HIV, organ transplant, long-term use of steroids or other immunosuppressive   medications, splenectomy) were denied.   Belem does not smoke or use smokeless tobacco. Belem does not vape or use other e-cigarette products.   Belem denies pregnancy and denies  breastfeeding.     MEDICATIONS: acetaminophen oral, albuterol sulfate inhalation, Wegovy subcutaneous, propranolol oral, sumatriptan oral, montelukast oral, norethindrone (contraceptive) oral, ALLERGIES: NKDA  Clinician Response:  Dear Belem,  Based on the information you have provided, you have an acute urinary tract infection, also called a bladder infection. Bladder infections occur when bacteria from the outside of the body enters the urinary tract. Any   part of the urinary system can be infected, but the bladder is the most common.  Medication information  I am prescribing:       Nitrofurantoin monohyd/m-cryst (Macrobid) 100 mg oral capsule. Take 1 capsule by mouth every 12 hours for 5 days. Take this medication with food. There are no refills with this prescription.      Phenazopyridine (Pyridium) 200 mg oral tablet. Take 1 tablet by mouth 3 times per day for up to 2 days as needed. Take the tablets with a full glass of water after a meal. There are no refills with this prescription. This medication may also be purchased   over-the-counter.     The medication I prescribed for your bladder infection is an antibiotic. Continue taking the medication until it is gone even if you feel better.   Yeast infections can be a common side effect of antibiotics. The most common symptom of a yeast   infection is itchiness in and around the vagina. Other signs and symptoms include burning, redness of the vulva (the outer part of the female genitals), or a thick, white vaginal discharge that looks like cottage cheese and does not have a bad smell.    Self care  Urination helps to flush bacteria from the urinary tract. For this reason, drinking water and urinating often helps relieve some urinary symptoms and can decrease your risk of getting bladder infections in the future.  Other steps you can take   to prevent future bladder infections include:     Wipe front to back after using the bathroom    Urinate after sexual  intercourse    Avoid using deodorant sprays, douches, or powders in the vaginal area     When to seek care  Please make an appointment to be seen in a clinic or urgent care if any of the following occur:     You develop new symptoms or your symptoms become worse    You have medication side effects that make it difficult to take them as prescribed    Your symptoms do not improve within 1-2 days of starting treatment    You have symptoms of a bladder infection that return shortly after completing treatment     It is possible to have an allergic reaction to an antibiotic even if you have not had one in the past. If you notice a new rash, significant swelling, or difficulty breathing, stop taking this medication immediately and go to a clinic or urgent care.   Diagnosis: Acute uncomplicated bladder infection  Diagnosis ICD: N39.0  Prescriptions  Prescription: fluconazole 150 mg oral tablet, take 1 tablet by mouth every 3 days until finished  Sent To: [facility] - [id number] - [address],  [place] [address]  Prescription: nitrofurantoin monohyd/m-cryst (Macrobid) 100 mg oral capsule, take 1 capsule by mouth every 12 hours for 5 days  Sent To: [facility] - [id number] - [address],  [place] [address]  Prescription: phenazopyridine (Pyridium) 200 mg oral tablet, take 1 tablet by mouth 3 times per day for up to 2 days as needed  Sent To: [facility] - [id number] - [address],  [place] [address]

## 2024-08-08 RX ORDER — FLUCONAZOLE 150 MG/1
TABLET ORAL
Qty: 2 TABLET | Refills: 0 | Status: CANCELLED | OUTPATIENT
Start: 2024-08-08

## 2024-08-08 RX ORDER — NITROFURANTOIN 25; 75 MG/1; MG/1
100 CAPSULE ORAL 2 TIMES DAILY
Qty: 10 CAPSULE | Refills: 0 | Status: CANCELLED | OUTPATIENT
Start: 2024-08-08 | End: 2024-08-13

## 2024-08-08 RX ORDER — PHENAZOPYRIDINE HYDROCHLORIDE 200 MG/1
200 TABLET, FILM COATED ORAL 3 TIMES DAILY PRN
Qty: 10 TABLET | Refills: 0 | Status: CANCELLED | OUTPATIENT
Start: 2024-08-08 | End: 2024-08-10

## 2024-08-22 ENCOUNTER — SPECIALTY PHARMACY (OUTPATIENT)
Dept: ENDOCRINOLOGY | Age: 34
End: 2024-08-22
Payer: COMMERCIAL

## 2024-08-22 DIAGNOSIS — T78.40XA ALLERGY, INITIAL ENCOUNTER: ICD-10-CM

## 2024-08-22 RX ORDER — MONTELUKAST SODIUM 10 MG/1
10 TABLET ORAL NIGHTLY
Qty: 30 TABLET | Refills: 0 | Status: SHIPPED | OUTPATIENT
Start: 2024-08-22

## 2024-08-22 NOTE — PROGRESS NOTES
Specialty Pharmacy Refill Coordination Note     Belem is a 33 y.o. female contacted today regarding refills of  2 specialty medication(s).    Reviewed and verified with patient:       Specialty medication(s) and dose(s) confirmed: yes    Refill Questions      Flowsheet Row Most Recent Value   Changes to allergies? No   Changes to medications? No   New conditions or infections since last clinic visit No   Unplanned office visit, urgent care, ED, or hospital admission in the last 4 weeks  No   How does patient/caregiver feel medication is working? Good   Financial problems or insurance changes  No   Since the previous refill, were any specialty medication doses or scheduled injections missed or delayed?  No   Does this patient require a clinical escalation to a pharmacist? No            Delivery Questions      Flowsheet Row Most Recent Value   Delivery method UPS   Delivery address verified with patient/caregiver? Yes   Delivery address Home   Number of medications in delivery 2   Medication(s) being filled and delivered Propranolol Hcl, Montelukast Sodium (Leukotriene Modulators)   Doses left of specialty medications 1 week   Copay verified? Yes   Copay amount $0   Copay form of payment No copayment ($0)   Ship Date 08/22   Delivery Date 08/23   Signature Required No                   Follow-up: 25 day(s)     Cee Laurent, Pharmacy Technician  Specialty Pharmacy Technician

## 2024-09-04 ENCOUNTER — SPECIALTY PHARMACY (OUTPATIENT)
Dept: ENDOCRINOLOGY | Age: 34
End: 2024-09-04
Payer: COMMERCIAL

## 2024-09-04 NOTE — PROGRESS NOTES
Specialty Pharmacy Refill Coordination Note     Belem is a 33 y.o. female contacted today regarding refills of  2 specialty medication(s).    Reviewed and verified with patient:       Specialty medication(s) and dose(s) confirmed: yes    Refill Questions      Flowsheet Row Most Recent Value   Changes to allergies? No   Changes to medications? No   New conditions or infections since last clinic visit No   Unplanned office visit, urgent care, ED, or hospital admission in the last 4 weeks  No   How does patient/caregiver feel medication is working? Good   Financial problems or insurance changes  No   Since the previous refill, were any specialty medication doses or scheduled injections missed or delayed?  No   Does this patient require a clinical escalation to a pharmacist? No            Delivery Questions      Flowsheet Row Most Recent Value   Delivery method Beeline   Delivery address verified with patient/caregiver? Yes   Delivery address Home   Number of medications in delivery 2   Medication(s) being filled and delivered Sumatriptan Succinate (Serotonin Agonists), Norethindrone (Progestin Contraceptives - Oral)   Doses left of specialty medications 1 week   Copay verified? Yes   Copay amount 6.55   Copay form of payment Credit/debit on file   Ship Date 09/04   Delivery Date 09/05   Signature Required No                   Follow-up: 25 day(s)     Cee Laurent, Pharmacy Technician  Specialty Pharmacy Technician

## 2024-09-16 ENCOUNTER — SPECIALTY PHARMACY (OUTPATIENT)
Dept: ENDOCRINOLOGY | Age: 34
End: 2024-09-16
Payer: COMMERCIAL

## 2024-09-16 DIAGNOSIS — T78.40XA ALLERGY, INITIAL ENCOUNTER: ICD-10-CM

## 2024-09-16 DIAGNOSIS — G43.109 MIGRAINE WITH AURA AND WITHOUT STATUS MIGRAINOSUS, NOT INTRACTABLE: ICD-10-CM

## 2024-09-16 RX ORDER — PROPRANOLOL HYDROCHLORIDE 80 MG/1
80 TABLET ORAL DAILY
Qty: 30 TABLET | Refills: 2 | Status: SHIPPED | OUTPATIENT
Start: 2024-09-16

## 2024-09-16 RX ORDER — MONTELUKAST SODIUM 10 MG/1
10 TABLET ORAL NIGHTLY
Qty: 30 TABLET | Refills: 0 | Status: SHIPPED | OUTPATIENT
Start: 2024-09-16

## 2024-09-25 ENCOUNTER — SPECIALTY PHARMACY (OUTPATIENT)
Dept: ENDOCRINOLOGY | Age: 34
End: 2024-09-25
Payer: COMMERCIAL

## 2024-10-09 ENCOUNTER — SPECIALTY PHARMACY (OUTPATIENT)
Dept: ENDOCRINOLOGY | Age: 34
End: 2024-10-09
Payer: COMMERCIAL

## 2024-10-09 NOTE — PROGRESS NOTES
Specialty Pharmacy Refill Coordination Note     Belem is a 33 y.o. female contacted today regarding refills of  1 specialty medication(s).    Reviewed and verified with patient:       Specialty medication(s) and dose(s) confirmed: yes    Refill Questions      Flowsheet Row Most Recent Value   Changes to allergies? No   Changes to medications? No   New conditions or infections since last clinic visit No   Unplanned office visit, urgent care, ED, or hospital admission in the last 4 weeks  No   How does patient/caregiver feel medication is working? Good   Financial problems or insurance changes  No   Since the previous refill, were any specialty medication doses or scheduled injections missed or delayed?  No   Does this patient require a clinical escalation to a pharmacist? No            Delivery Questions      Flowsheet Row Most Recent Value   Delivery method UPS   Delivery address verified with patient/caregiver? Yes   Delivery address Home   Number of medications in delivery 1   Medication(s) being filled and delivered Semaglutide-Weight Management   Doses left of specialty medications 1   Copay verified? Yes   Copay amount 161.36   Copay form of payment HSA/FSA on file   Ship Date 10/09   Delivery Date 10/10   Signature Required No                   Follow-up: 25 day(s)     Cee Laurent, Pharmacy Technician  Specialty Pharmacy Technician

## 2024-10-15 ENCOUNTER — SPECIALTY PHARMACY (OUTPATIENT)
Dept: ENDOCRINOLOGY | Age: 34
End: 2024-10-15
Payer: COMMERCIAL

## 2024-10-15 DIAGNOSIS — T78.40XA ALLERGY, INITIAL ENCOUNTER: ICD-10-CM

## 2024-10-15 RX ORDER — MONTELUKAST SODIUM 10 MG/1
10 TABLET ORAL NIGHTLY
Qty: 30 TABLET | Refills: 0 | Status: SHIPPED | OUTPATIENT
Start: 2024-10-15

## 2024-10-15 NOTE — PROGRESS NOTES
Specialty Pharmacy Refill Coordination Note     Belem is a 33 y.o. female contacted today regarding refills of  2 specialty medication(s).    Reviewed and verified with patient:       Specialty medication(s) and dose(s) confirmed: yes    Refill Questions      Flowsheet Row Most Recent Value   Changes to allergies? No   Changes to medications? No   New conditions or infections since last clinic visit No   Unplanned office visit, urgent care, ED, or hospital admission in the last 4 weeks  No   How does patient/caregiver feel medication is working? Good   Financial problems or insurance changes  No   Since the previous refill, were any specialty medication doses or scheduled injections missed or delayed?  No   Does this patient require a clinical escalation to a pharmacist? No            Delivery Questions      Flowsheet Row Most Recent Value   Delivery method UPS   Delivery address verified with patient/caregiver? Yes   Delivery address Home   Number of medications in delivery 2   Medication(s) being filled and delivered Montelukast Sodium (SINGULAIR), Propranolol HCl (INDERAL)   Doses left of specialty medications 1   Copay verified? Yes   Copay amount 0   Copay form of payment No copayment ($0)   Ship Date 10/16   Delivery Date 10/17   Signature Required No                   Follow-up: 25 day(s)     Cee Laurent, Pharmacy Technician  Specialty Pharmacy Technician

## 2024-10-18 ENCOUNTER — SPECIALTY PHARMACY (OUTPATIENT)
Dept: ENDOCRINOLOGY | Age: 34
End: 2024-10-18
Payer: COMMERCIAL

## 2024-10-18 NOTE — PROGRESS NOTES
Specialty Pharmacy Refill Coordination Note     Belem is a 33 y.o. female contacted today regarding refills of  1 specialty medication(s).    Reviewed and verified with patient:       Specialty medication(s) and dose(s) confirmed: yes    Refill Questions      Flowsheet Row Most Recent Value   Changes to allergies? No   Changes to medications? No   New conditions or infections since last clinic visit No   Unplanned office visit, urgent care, ED, or hospital admission in the last 4 weeks  No   How does patient/caregiver feel medication is working? Good   Financial problems or insurance changes  No   Since the previous refill, were any specialty medication doses or scheduled injections missed or delayed?  No   Does this patient require a clinical escalation to a pharmacist? No            Delivery Questions      Flowsheet Row Most Recent Value   Delivery method UPS   Delivery address verified with patient/caregiver? Yes   Delivery address Home   Number of medications in delivery 1   Medication(s) being filled and delivered Norethindrone (MICRONOR)   Doses left of specialty medications 1   Copay verified? Yes   Copay amount 0   Copay form of payment No copayment ($0)   Ship Date 10/21   Delivery Date 10/22   Signature Required No                   Follow-up: 25 day(s)     Cee Laurent, Pharmacy Technician  Specialty Pharmacy Technician

## 2024-11-04 ENCOUNTER — E-VISIT (OUTPATIENT)
Dept: FAMILY MEDICINE CLINIC | Facility: TELEHEALTH | Age: 34
End: 2024-11-04
Payer: COMMERCIAL

## 2024-11-04 PROCEDURE — FABRICHEALTHVISIT: Performed by: NURSE PRACTITIONER

## 2024-11-04 NOTE — E-VISIT TREATED
Date: 2024 10:39:50  Clinician: Carmelina Lizama  Clinician NPI: [id number]  Patient: Belem Mcknight  Patient : 1990  Patient Address: [address] [place] [address]  Patient Phone: [phone]  Visit Protocol: URI  Patient Summary:  Belem is a 33 year old ( : 1990 ) female who initiated a visit for cold, sinus infection, or influenza.     Belem states the symptoms started gradually 2-3 weeks ago. After the symptoms started, they improved and then got worse   again.   Symptom start date: Unknown   The symptoms consist of nasal congestion, nausea, a cough, malaise, anosmia, a sore throat, ear pain, rhinitis, and enlarged lymph nodes. Belem is experiencing mild difficulty breathing with daily activities but   can speak normally in full sentences.   Symptom details     Nasal secretions: The color of the mucus is green, blood-tinged, and white.    Cough: Belem coughs every 5-10 minutes and the cough is more bothersome at night. Phlegm comes into the throat when coughing. Belem believes the cough is caused by post-nasal drip. The color of the phlegm is white, yellow, and green.     Sore throat: Belem reports having mild throat pain (1-3 on a 10 point pain scale), has exudate on the tonsils, and can swallow liquids with mild discomfort. The lymph nodes in the neck are enlarged. The lymph node swelling is not worse on one side and   the swelling has caused changes in speech or hoarseness in the voice. A rash has not appeared on the skin since the sore throat started.      Belem denies having headache, myalgias, teeth pain, fever, ageusia, facial pain or pressure, wheezing, chills, vomiting, and diarrhea. Belem also denies having recent facial or sinus surgery in the past 60 days.   Precipitating events  Within the past   week, Belem has been exposed to someone with strep throat. Belem has not recently been exposed to someone with influenza. Belem has been in close contact with the following  high risk individuals: children under the age of 5.    Belem has not received the   influenza vaccination.   Pertinent COVID-19 (Coronavirus) information  Since the symptoms started, Belem has not tested for COVID-19.   Belem has not had COVID-19 in the last 3 months.   Belem has not received a COVID-19 vaccine in the past year.     Pertinent medical history     Belem had 1 sinus infection within the past year.   Belem has taken an antibiotic medication for similar symptoms in the past month. Antibiotic name as reported by the patient (free text): Amoxicillin   Belem typically gets   a yeast infection when an antibiotic is taken. Belem has successfully used non-prescription therapy to treat previous yeast infections.   A provider has not told Belem to avoid NSAIDs.   Belem does not have diabetes. Belem denies having immunosuppressive   conditions (e.g., chemotherapy, HIV, organ transplant, long-term use of steroids or other immunosuppressive medications, splenectomy). Belem does not have asthma.   Belem denies having chronic lung disease, cystic fibrosis, hypertension, long-term   disabilities, mental health conditions, sickle cell disease or thalassemia, stroke or other cardiovascular disease, substance use disorders, or tuberculosis (TB).  Belem does not smoke or use smokeless tobacco. Belem vapes or uses other e-cigarette   products.   Belem denies pregnancy and denies breastfeeding.   Additional information as reported by the patient (free text): I'm so hoarse I can barely speak, my chest feels like it has thick mucus that won't come up and when I cough i either get   everything up in a painful coughing git that makes me gag and causes nerve pain in my back or it's a dry shallow cough and I can't get anything up. The inhaler helps for a out 30 minutes but then I'm back to it. The mornings and nights are the worst.     Weight: 325 lbs (147.42 kg)    MEDICATIONS: montelukast oral, Airsupra inhalation, propranolol  oral, norethindrone (contraceptive) oral, albuterol sulfate inhalation, acetaminophen oral, Wegovy subcutaneous, sumatriptan oral, ALLERGIES: NKDA  Clinician Response:  Dear Belem,  Based on the information provided, you have acute bacterial sinusitis, also known as a sinus infection. Sinus infections are caused by bacteria or a virus and symptoms are almost always identical. The difference between   the 2 types of infections is timing.  Sinus infections start as viral infections and symptoms improve on their own in about 7 days. A bacterial infection may have developed if any of the following apply to you:     You have had 7 days of symptoms and are experiencing at least 2 of the following:       Fever    Facial pressure or headache    Green or yellow nasal mucus    Symptoms that improved and then got worse again       You have had symptoms for 10 or more days     Based on the information provided, you have viral bronchitis, also known as a chest cold. This is a cough that occurs when a cold or other virus settles into your chest. The cough may be dry or you could notice you are coughing up some phlegm.  It is   not unusual for a cough to last 3 weeks or more. Treatment focuses on controlling your symptoms as much as possible while you recover.  Medication information  Because you have a viral infection, antibiotics will not help you get better. Treating a viral   infection with antibiotics could actually make you feel worse.  I am prescribing:       Amoxicillin-pot clavulanate 875-125 mg oral tablet. Take 1 tablet by mouth every 12 hours for 10 days. There are no refills with this prescription.      Brompheniramine-pseudoeph-DM (Bromfed DM) 2-30-10 mg/5mL oral syrup. Take 10 milliliters by mouth every 4 hours as needed for cough. Do not take more than 60mL in 24 hours. There are no refills with this prescription.     Because you usually get a yeast infection when taking antibiotics, I am also prescribing:      Fluconazole (Diflucan) 150 mg oral tablet. Take 1 tablet by mouth 1 time a day for 1 day. There are no refills with this prescription.   If you become pregnant during this course of treatment, stop taking the medication and contact your primary care   provider.  Self care  Steps you can take to be as comfortable as possible:     Rest.    Drink plenty of fluids.    Take a warm shower to loosen congestion.    Use a cool-mist humidifier.    Use throat lozenges.    Suck on frozen items such as popsicles.    Drink hot tea with lemon and honey.    Gargle with warm salt water (1/4 teaspoon of salt per 8 ounce glass of water).    Take a spoonful of honey to reduce your cough.     Quitting vaping or other e-cigarette products is one of the best things you can do to improve your health. Vaping and the use of e-cigarettes have been found to increase the risk of upper respiratory infections and can also lead to more severe   symptoms. Most e-cigarettes contain nicotine and other harmful substances. You can get free help from smoking cessation experts to quit e-cigarettes or vaping by calling toll-free at 9-124-QUIT-NOW (1-760.397.8373).   When to seek care  Please be seen in   a clinic or urgent care if any of the following occur:     New symptoms develop, or symptoms become worse    Symptoms do not start to improve after 3 days of treatment     Call 911 or go to the emergency room if any of the following occur:     Difficulty breathing    If you feel that your throat is closing off    Suddenly develop a rash    Unable to swallow fluids or are drooling     It is possible to have an allergic reaction to an antibiotic even if you have not had one in the past. If you notice a new rash, significant swelling, or difficulty breathing, stop taking this medication immediately and go to a clinic or urgent care.    For the latest updates on COVID-19 (Coronavirus), please visit the Centers for Disease Control and Prevention (CDC). Also,  your state and local health department websites may provide additional guidance regarding testing and isolation recommendations for   your location.   Diagnosis: Acute bacterial sinusitis  Diagnosis ICD: J01.90    Follow up instructions:  ATTENTION: If you have been prescribed medications, your prescriptions will not be sent until you choose your pharmacy. To do so open the link within your notification, or go to [employer] and click eVisit in the menu to open your   treatment plan. From there, you can select your pharmacy at the bottom of your after visit summary. You can also go to [email or web address]   Prescriptions  Prescription: brompheniramine-pseudoeph-DM (Bromfed DM) 2-30-10 mg/5 mL oral syrup, take 10 milliliters by mouth every 4 hours as needed for cough  Sent To: [facility] [id number] - [address] [place] [address]  Prescription: prednisone 10 mg oral tablets,dose pack, take tablets,dose pack  Sent To: [facility] [id number] - [address] [place] [address]  Prescription: fluconazole (Diflucan) 150 mg oral tablet, take 1 tablet by mouth 1 time per day for 1 day  Sent To: [facility] [id number] - [address] [facility] [address], [place] [address]  Prescription: amoxicillin-pot clavulanate 875-125 mg oral tablet, take 1 tablet by mouth every 12 hours for 10 days  Sent To: [facility] [id number] - [address] [place] [address]

## 2024-11-05 RX ORDER — SEMAGLUTIDE 1 MG/.5ML
1 INJECTION, SOLUTION SUBCUTANEOUS WEEKLY
Qty: 2 ML | Refills: 1 | OUTPATIENT
Start: 2024-11-05

## 2024-11-05 NOTE — TELEPHONE ENCOUNTER
Rx Refill Note  Requested Prescriptions     Pending Prescriptions Disp Refills    Semaglutide-Weight Management (Wegovy) 1 MG/0.5ML solution auto-injector 2 mL 1     Sig: Inject 0.5 mL under the skin into the appropriate area as directed 1 (One) Time Per Week.      Last office visit with prescribing clinician: 12/14/2023   Last telemedicine visit with prescribing clinician: Visit date not found   Next office visit with prescribing clinician: Visit date not found                         Would you like a call back once the refill request has been completed: [] Yes [] No    If the office needs to give you a call back, can they leave a voicemail: [] Yes [] No    Sadia Sanz  11/05/24, 10:12 EST

## 2024-11-22 ENCOUNTER — SPECIALTY PHARMACY (OUTPATIENT)
Dept: ENDOCRINOLOGY | Age: 34
End: 2024-11-22
Payer: COMMERCIAL

## 2024-11-22 NOTE — PROGRESS NOTES
Specialty Pharmacy Refill Coordination Note     Belem is a 33 y.o. female contacted today regarding refills of  2 specialty medication(s).    Reviewed and verified with patient:       Specialty medication(s) and dose(s) confirmed: n/a    Refill Questions      Flowsheet Row Most Recent Value   Changes to allergies? No   Changes to medications? No   New conditions or infections since last clinic visit No   Unplanned office visit, urgent care, ED, or hospital admission in the last 4 weeks  No   How does patient/caregiver feel medication is working? Good   Financial problems or insurance changes  No   Since the previous refill, were any specialty medication doses or scheduled injections missed or delayed?  No   Does this patient require a clinical escalation to a pharmacist? No            Delivery Questions      Flowsheet Row Most Recent Value   Delivery method UPS   Delivery address verified with patient/caregiver? Yes   Delivery address Home   Number of medications in delivery 2   Medication(s) being filled and delivered Propranolol HCl (INDERAL), Norethindrone (MICRONOR)   Copay verified? Yes   Copay amount $0   Copay form of payment No copayment ($0)   Ship Date 11/25   Delivery Date 11/26   Signature Required No                   Follow-up: 23 day(s)     Mary Alice Caraballo, Pharmacy Technician  Specialty Pharmacy Technician

## 2024-12-17 ENCOUNTER — SPECIALTY PHARMACY (OUTPATIENT)
Dept: ENDOCRINOLOGY | Age: 34
End: 2024-12-17
Payer: COMMERCIAL

## 2024-12-17 NOTE — PROGRESS NOTES
Specialty Pharmacy Refill Coordination Note     Belem is a 34 y.o. female contacted today regarding refills of  1 specialty medication(s).    Reviewed and verified with patient:       Specialty medication(s) and dose(s) confirmed: yes    Refill Questions      Flowsheet Row Most Recent Value   Changes to allergies? No   Changes to medications? No   New conditions or infections since last clinic visit No   Unplanned office visit, urgent care, ED, or hospital admission in the last 4 weeks  No   How does patient/caregiver feel medication is working? Good   Financial problems or insurance changes  No   Since the previous refill, were any specialty medication doses or scheduled injections missed or delayed?  No   Does this patient require a clinical escalation to a pharmacist? No            Delivery Questions      Flowsheet Row Most Recent Value   Delivery method UPS   Delivery address verified with patient/caregiver? Yes   Delivery address Home   Number of medications in delivery 1   Medication(s) being filled and delivered Norethindrone (MICRONOR)   Doses left of specialty medications 1 week   Copay verified? Yes   Copay amount $0.00   Copay form of payment No copayment ($0)   Ship Date 12/17   Delivery Date 12/18   Signature Required No                   Follow-up: 23 day(s)     Janis Abreu, Pharmacy Technician  Specialty Pharmacy Technician

## 2024-12-19 DIAGNOSIS — G43.109 MIGRAINE WITH AURA AND WITHOUT STATUS MIGRAINOSUS, NOT INTRACTABLE: ICD-10-CM

## 2024-12-19 RX ORDER — PROPRANOLOL HYDROCHLORIDE 80 MG/1
80 TABLET ORAL DAILY
Qty: 30 TABLET | Refills: 2 | Status: SHIPPED | OUTPATIENT
Start: 2024-12-19

## 2024-12-31 DIAGNOSIS — T78.40XA ALLERGY, INITIAL ENCOUNTER: ICD-10-CM

## 2024-12-31 RX ORDER — MONTELUKAST SODIUM 10 MG/1
10 TABLET ORAL NIGHTLY
Qty: 30 TABLET | Refills: 0 | Status: SHIPPED | OUTPATIENT
Start: 2024-12-31

## 2025-01-07 ENCOUNTER — SPECIALTY PHARMACY (OUTPATIENT)
Dept: ENDOCRINOLOGY | Age: 35
End: 2025-01-07
Payer: COMMERCIAL

## 2025-01-07 DIAGNOSIS — G43.109 MIGRAINE WITH AURA AND WITHOUT STATUS MIGRAINOSUS, NOT INTRACTABLE: ICD-10-CM

## 2025-01-07 RX ORDER — FAMOTIDINE 10 MG
10 TABLET ORAL DAILY
COMMUNITY

## 2025-01-07 NOTE — PROGRESS NOTES
Specialty Pharmacy Patient Management Program  Endocrinology Reassessment     Belem Mcknight was referred by an Endocrinology provider to the Endocrinology Patient Management program offered by [facility] for Weight Management. A follow-up outreach was conducted, including assessment of continued therapy appropriateness, medication adherence, and side effect incidence and management for Wegovy.    Changes to Insurance Coverage or Financial Support  No changes    Relevant Past Medical History and Comorbidities  Relevant medical history and concomitant health conditions were discussed with the patient. The patient's chart has been reviewed for relevant past medical history and comorbid health conditions and updated as necessary.   Past Medical History:   Diagnosis Date    Back pain     Bulging lumbar disc     last incident 2020    Gestational hypertension     Headache     Placenta previa 2021    first pregnancy    Tailbone injury     broken possibly twice 16 years old and 21 years old    Thyroid nodule 2021    enlarged- benign biopsy     Social History     Socioeconomic History    Marital status:    Tobacco Use    Smoking status: Never     Passive exposure: Never    Smokeless tobacco: Never   Vaping Use    Vaping status: Never Used   Substance and Sexual Activity    Alcohol use: No    Drug use: No    Sexual activity: Yes     Partners: Male     Birth control/protection: None     Comment: sig other = PREET          Hospitalizations and Urgent Care Since Last Assessment  ED Visits, Admissions, or Hospitalizations: none  Urgent Office Visits: none    Allergies  Known allergies and reactions were discussed with the patient. The patient's chart has been reviewed for allergy information and updated as necessary.   No Known Allergies  Allergies reviewed by Miley Simpson, PharmD on 1/7/2025 at 10:19 AM    Relevant Laboratory Values  Relevant laboratory values were discussed with the patient.  The following specialty medication dose adjustment(s) are recommended: n/a    Lab Results   Component Value Date    HGBA1C 5.50 12/14/2023     Lab Results   Component Value Date    GLUCOSE 89 07/01/2024    CALCIUM 9.4 07/01/2024     07/01/2024    K 4.3 07/01/2024    CO2 24 07/01/2024     07/01/2024    BUN 12 07/01/2024    CREATININE 0.62 07/01/2024    EGFRIFAFRI 130 02/27/2019    EGFRIFNONA 107 02/27/2019    BCR 19 07/01/2024    ANIONGAP 10.0 11/18/2022     Lab Results   Component Value Date    CHLPL 192 07/01/2024    TRIG 134 07/01/2024    HDL 40 07/01/2024     (H) 07/01/2024       Current Medication List  This medication list has been reviewed with the patient and evaluated for any interactions or necessary modifications/recommendations, and updated to include all prescription medications, OTC medications, and supplements the patient is currently taking.  This list reflects what is contained in the patient's profile, which has also been marked as reviewed to communicate to other providers it is the most up to date version of the patient's current medication therapy.     Current Outpatient Medications:     albuterol sulfate  (90 Base) MCG/ACT inhaler, Inhale 2 puffs every 6 hours as needed for wheezing. If symptoms are severe, may use as often as every 4 hours., Disp: 6.7 g, Rfl: 0    norethindrone (MICRONOR) 0.35 MG tablet, Take 1 tablet by mouth Daily., Disp: 84 tablet, Rfl: 3    Oxymetazoline HCl (NASAL SPRAY SINUS NA), into the nostril(s) as directed by provider As Needed., Disp: , Rfl:     propranolol (INDERAL) 80 MG tablet, Take 1 tablet by mouth Daily., Disp: 30 tablet, Rfl: 2    Semaglutide-Weight Management (Wegovy) 1 MG/0.5ML solution auto-injector, Inject 0.5 mL under the skin into the appropriate area as directed 1 (One) Time Per Week., Disp: 2 mL, Rfl: 1    SUMAtriptan (IMITREX) 50 MG tablet, Take 1 tablet by mouth 1 (One) Time As Needed for Migraine. Take one tablet at onset  of headache. May repeat dose one time in 2 hours if headache not relieved., Disp: 30 tablet, Rfl: 0    Albuterol-Budesonide (Airsupra) 90-80 MCG/ACT aerosol, Take 2 puffs every 4 hours as needed for cough, wheezing or shortness of air; not to exceed 12 puffs in 24 hour period. Rinse mouth after each use., Disp: 10.7 g, Rfl: 3    montelukast (Singulair) 10 MG tablet, Take 1 tablet by mouth Every Night. (Patient not taking: Reported on 1/7/2025), Disp: 30 tablet, Rfl: 0         Drug Interactions  none    Recommended Medications Assessment  Aspirin: Not Indicated  Statin: Not Taking Currently  ACEi/ARB: Not Taking Currently    Adverse Drug Reactions  Medication tolerability: Tolerating with no to minimal ADRs  Medication plan: Continue therapy with normal follow-up  Plan for ADR Management: n/a    Adherence, Self-Administration, and Current Therapy Problems  Adherence related to the patient's specialty therapy was discussed with the patient. The Adherence segment of this outreach has been reviewed and updated.     Adherence Questions  Linked Medication(s) Assessed: Norethindrone (MICRONOR), Semaglutide-Weight Management (Wegovy)  On average, how many doses/injections does the patient miss per month?: 3 (pt reports that she is unable to get a refill until she is seen, but she needs to have labs done first. Pt is aware, but has not yet scheduled labs. Will need to re-titrate once wegovy is resumed.)  What are the identified reasons for non-adherence or missed doses? : no problems identified  What is the estimated medication adherence level?: 80-89%  Based on the patient/caregiver response and refill history, does this patient require an MTP to track adherence improvements?: no    Additional Barriers to Patient Self-Administration: none  Methods for Supporting Patient Self-Administration: n/a    Open Medication Therapy Problems  No medication therapy recommendations to display    Goals of Therapy  Goals related to the  patient's specialty therapy were discussed with the patient. The Patient Goals segment of this outreach has been reviewed and updated.   Goals Addressed Today    None         Quality of Life Assessment   Quality of Life related to the patient's enrollment in the patient management program and services provided was discussed with the patient. The QOL segment of this outreach has been reviewed and updated.  Quality of Life Improvement Scale: 8-Moderately better    Reassessment Plan & Follow-Up  1. Medication Therapy Changes: pt not currently taking wegovy as she cannot get additional refills until she schedules a follow up appointment, but states that she needs to have labs done prior. Reminded patient to do so. Will need to re-titrate wegovy upon restarting.  2. Related Plans, Therapy Recommendations, or Issues to Be Addressed: No issues  3. Pharmacist to perform regular assessments no more than (6) months from the previous assessment.  4. Care Coordinator to set up future refill outreaches, coordinate prescription delivery, and escalate clinical questions to pharmacist.    Attestation  Therapeutic appropriateness: Appropriate   I attest the patient was actively involved in and has agreed to the above plan of care.  If the prescribed therapy is at any point deemed not appropriate based on the current or future assessments, a consultation will be initiated with the patient's specialty care provider to determine the best course of action. The revised plan of therapy will be documented along with any required assessments and/or additional patient education provided.     Miley Simpson, Safia  Clinical Specialty Pharmacist, Endocrinology  1/7/2025  10:57 EST    Discussed the aforementioned information with the patient via Telephone.

## 2025-01-08 RX ORDER — SUMATRIPTAN 50 MG/1
50 TABLET, FILM COATED ORAL ONCE AS NEEDED
Qty: 30 TABLET | Refills: 0 | Status: SHIPPED | OUTPATIENT
Start: 2025-01-08

## 2025-01-16 ENCOUNTER — SPECIALTY PHARMACY (OUTPATIENT)
Dept: ENDOCRINOLOGY | Age: 35
End: 2025-01-16
Payer: COMMERCIAL

## 2025-01-16 NOTE — PROGRESS NOTES
Specialty Pharmacy Refill Coordination Note     Belem is a 34 y.o. female contacted today regarding refills of  1 specialty medication(s).    Reviewed and verified with patient:       Specialty medication(s) and dose(s) confirmed: yes    Refill Questions      Flowsheet Row Most Recent Value   Changes to allergies? No   Changes to medications? No   New conditions or infections since last clinic visit No   Unplanned office visit, urgent care, ED, or hospital admission in the last 4 weeks  No   How does patient/caregiver feel medication is working? Good   Financial problems or insurance changes  No   Since the previous refill, were any specialty medication doses or scheduled injections missed or delayed?  No   Does this patient require a clinical escalation to a pharmacist? No            Delivery Questions      Flowsheet Row Most Recent Value   Delivery method UPS   Delivery address verified with patient/caregiver? Yes   Delivery address Home   Number of medications in delivery 1   Medication(s) being filled and delivered Norethindrone (MICRONOR)   Doses left of specialty medications 1 week   Copay verified? Yes   Copay amount $0.00   Copay form of payment No copayment ($0)   Ship Date 1/16   Delivery Date 1/17   Signature Required No                   Follow-up: 23 day(s)     Janis Abreu, Pharmacy Technician  Specialty Pharmacy Technician

## 2025-02-03 ENCOUNTER — TELEPHONE (OUTPATIENT)
Dept: ENDOCRINOLOGY | Age: 35
End: 2025-02-03
Payer: COMMERCIAL

## 2025-02-03 NOTE — TELEPHONE ENCOUNTER
Called and spoke with patient and scheduled her a follow up appt and understands will recheck levels when you see her.

## 2025-02-03 NOTE — TELEPHONE ENCOUNTER
Provider: DR PATRICIA Davis: Belem Mcknight    Relationship to Patient: Self    Reason for Call: PATIENT WOULD LIKED GET HER LABS DRAWN. PLEASE PLACE ORDER

## 2025-02-10 ENCOUNTER — SPECIALTY PHARMACY (OUTPATIENT)
Dept: ENDOCRINOLOGY | Age: 35
End: 2025-02-10
Payer: COMMERCIAL

## 2025-02-10 NOTE — PROGRESS NOTES
Specialty Pharmacy Refill Coordination Note     Belem is a 34 y.o. female contacted today regarding refills of  3 specialty medication(s).    Reviewed and verified with patient:       Specialty medication(s) and dose(s) confirmed: yes    Refill Questions      Flowsheet Row Most Recent Value   Changes to allergies? No   Changes to medications? No   New conditions or infections since last clinic visit No   Unplanned office visit, urgent care, ED, or hospital admission in the last 4 weeks  No   How does patient/caregiver feel medication is working? Good   Financial problems or insurance changes  No   Since the previous refill, were any specialty medication doses or scheduled injections missed or delayed?  No   Does this patient require a clinical escalation to a pharmacist? No            Delivery Questions      Flowsheet Row Most Recent Value   Delivery method UPS   Delivery address verified with patient/caregiver? Yes   Delivery address Home   Number of medications in delivery 3   Medication(s) being filled and delivered Norethindrone (MICRONOR), Propranolol HCl (INDERAL), SUMAtriptan Succinate (IMITREX)   Copay verified? Yes   Copay amount $13.64   Copay form of payment Credit/debit on file   Ship Date 02/10/2025   Delivery Date Selection 02/11/25   Signature Required No                   Follow-up: 23 day(s)     Janis Abreu, Pharmacy Technician  Specialty Pharmacy Technician

## 2025-02-11 ENCOUNTER — SPECIALTY PHARMACY (OUTPATIENT)
Dept: ENDOCRINOLOGY | Age: 35
End: 2025-02-11
Payer: COMMERCIAL

## 2025-02-11 NOTE — PROGRESS NOTES
Specialty Pharmacy Patient Management Program  Refill Outreach     Removing outreach for Wegovy, patient is not going to make an appointment at this time since she is unemployed presently and cannot afford the office co-pay. Will reinstate if/when patient's financial situation changes.     Janis Abreu, Pharmacy Technician  2/11/2025  09:54 EST

## 2025-02-24 ENCOUNTER — SPECIALTY PHARMACY (OUTPATIENT)
Dept: ENDOCRINOLOGY | Age: 35
End: 2025-02-24
Payer: COMMERCIAL

## 2025-02-24 NOTE — PROGRESS NOTES
Specialty Pharmacy Patient Management Program  Refill Outreach     Belem was contacted today regarding refills of their medication(s).    Refill Questions      Flowsheet Row Most Recent Value   Changes to allergies? No   Changes to medications? No   New conditions or infections since last clinic visit No   Unplanned office visit, urgent care, ED, or hospital admission in the last 4 weeks  No   How does patient/caregiver feel medication is working? Good   Financial problems or insurance changes  No   Since the previous refill, were any specialty medication doses or scheduled injections missed or delayed?  No   Does this patient require a clinical escalation to a pharmacist? No            Delivery Questions      Flowsheet Row Most Recent Value   Delivery method UPS   Delivery address verified with patient/caregiver? Yes   Delivery address Home   Number of medications in delivery 1   Medication(s) being filled and delivered Diclofenac Sodium (VOLTAREN)   Doses left of specialty medications 1 week   Copay verified? Yes   Copay amount $13.30   Copay form of payment HSA/FSA on file   Delivery Date Selection 02/25/25   Signature Required No                 Follow-up: 23 day(s)     Janis Abreu, Pharmacy Technician  2/24/2025  09:51 EST

## 2025-03-06 ENCOUNTER — SPECIALTY PHARMACY (OUTPATIENT)
Dept: ENDOCRINOLOGY | Age: 35
End: 2025-03-06
Payer: COMMERCIAL

## 2025-03-06 NOTE — PROGRESS NOTES
Specialty Pharmacy Patient Management Program  Refill Outreach     Belem was contacted today regarding refills of their medication(s).    Refill Questions      Flowsheet Row Most Recent Value   Changes to allergies? No   Changes to medications? No   New conditions or infections since last clinic visit No   Unplanned office visit, urgent care, ED, or hospital admission in the last 4 weeks  No   How does patient/caregiver feel medication is working? Good   Financial problems or insurance changes  No   Since the previous refill, were any specialty medication doses or scheduled injections missed or delayed?  No   Does this patient require a clinical escalation to a pharmacist? No            Delivery Questions      Flowsheet Row Most Recent Value   Delivery method UPS   Delivery address verified with patient/caregiver? Yes   Delivery address Home   Number of medications in delivery 2   Medication(s) being filled and delivered Norethindrone (MICRONOR), Propranolol HCl (INDERAL)   Doses left of specialty medications 1 week   Copay verified? Yes   Copay amount $0.00   Copay form of payment No copayment ($0)   Delivery Date Selection 03/07/25   Signature Required No                 Follow-up: 21 day(s)     Janis Abreu, Pharmacy Technician  3/6/2025  11:54 EST

## 2025-03-18 ENCOUNTER — OFFICE VISIT (OUTPATIENT)
Dept: ENDOCRINOLOGY | Age: 35
End: 2025-03-18
Payer: COMMERCIAL

## 2025-03-18 ENCOUNTER — LAB (OUTPATIENT)
Facility: HOSPITAL | Age: 35
End: 2025-03-18
Payer: COMMERCIAL

## 2025-03-18 ENCOUNTER — TELEPHONE (OUTPATIENT)
Dept: ENDOCRINOLOGY | Age: 35
End: 2025-03-18

## 2025-03-18 VITALS
HEART RATE: 99 BPM | WEIGHT: 293 LBS | BODY MASS INDEX: 52.42 KG/M2 | TEMPERATURE: 97.7 F | OXYGEN SATURATION: 98 % | SYSTOLIC BLOOD PRESSURE: 120 MMHG | DIASTOLIC BLOOD PRESSURE: 70 MMHG

## 2025-03-18 DIAGNOSIS — E66.01 CLASS 3 SEVERE OBESITY DUE TO EXCESS CALORIES WITHOUT SERIOUS COMORBIDITY WITH BODY MASS INDEX (BMI) OF 50.0 TO 59.9 IN ADULT: ICD-10-CM

## 2025-03-18 DIAGNOSIS — E04.1 THYROID NODULE: ICD-10-CM

## 2025-03-18 DIAGNOSIS — E66.813 CLASS 3 SEVERE OBESITY DUE TO EXCESS CALORIES WITHOUT SERIOUS COMORBIDITY WITH BODY MASS INDEX (BMI) OF 50.0 TO 59.9 IN ADULT: ICD-10-CM

## 2025-03-18 DIAGNOSIS — R23.2 HOT FLASHES: Primary | ICD-10-CM

## 2025-03-18 LAB
ESTRADIOL SERPL HS-MCNC: 96.7 PG/ML
FSH SERPL-ACNC: 4.98 MIU/ML
LH SERPL-ACNC: 8.34 MIU/ML

## 2025-03-18 PROCEDURE — 82977 ASSAY OF GGT: CPT | Performed by: INTERNAL MEDICINE

## 2025-03-18 PROCEDURE — 82670 ASSAY OF TOTAL ESTRADIOL: CPT | Performed by: STUDENT IN AN ORGANIZED HEALTH CARE EDUCATION/TRAINING PROGRAM

## 2025-03-18 PROCEDURE — 83002 ASSAY OF GONADOTROPIN (LH): CPT | Performed by: STUDENT IN AN ORGANIZED HEALTH CARE EDUCATION/TRAINING PROGRAM

## 2025-03-18 PROCEDURE — 99214 OFFICE O/P EST MOD 30 MIN: CPT | Performed by: STUDENT IN AN ORGANIZED HEALTH CARE EDUCATION/TRAINING PROGRAM

## 2025-03-18 PROCEDURE — 83001 ASSAY OF GONADOTROPIN (FSH): CPT | Performed by: STUDENT IN AN ORGANIZED HEALTH CARE EDUCATION/TRAINING PROGRAM

## 2025-03-18 PROCEDURE — 36415 COLL VENOUS BLD VENIPUNCTURE: CPT | Performed by: STUDENT IN AN ORGANIZED HEALTH CARE EDUCATION/TRAINING PROGRAM

## 2025-03-18 NOTE — TELEPHONE ENCOUNTER
Caller: Belem Mcknight    Relationship: Self    Best call back number:   Telephone Information:   Mobile [phone]     Who is your current provider: DR. GOMEZ    Is your current provider offboarding? NO    Who would you like your new provider to be: ANYONE    What are your reasons for transferring care: UNSATISFIED WITH PRACTICE, POOR COMMUNICATION

## 2025-03-18 NOTE — ASSESSMENT & PLAN NOTE
Normal menstrual cycles  Reports hot flashes  Takes norethindrone which can cause hot flashes  Check LH, FSH and estradiol  Has family history of early menopause

## 2025-03-18 NOTE — PROGRESS NOTES
"Chief Complaint  Class 3 severe obesity due to excess calories without bhanu    Subjective        Belem Mcknight presents to [facility] for follow up.       History of Present Illness      Last office visit December 2023    Gradual weight gain since she was a kid  Due to family history of Cushing disease was evaluated for Cushing in the past which was unremarkable  Reports morbid obesity in her family  Used to take Ozempic which was switched to Wegovy and it was not covered by her insurance  Off Wegovy since October 2024 due to some insurance  She is active      No personal or family history of pancreatitis, pancreatic cancer or thyroid cancer      Thyroid nodule  7/2023:  FINDINGS: The right thyroid lobe measures 4.8 x 1.8 x 1.6 cm, and the left thyroid lobe measures 4.1 x 1.5 x 1.1 cm, with 4 mm isthmus  thickness. A solid appearing isoechoic well marginated wider than tall  right thyroid nodule without microcalcification measures 1.1 x 1 x 0.6  cm, stable. IMPRESSION:  Right thyroid nodule, tirads-3. No further follow-up is  necessary.    No compressive symptoms        Periods are more regular now, reports hot flashes  LMP 3/1/2025       Objective   Vital Signs:  /70   Pulse 99   Temp 97.7 °F (36.5 °C) (Oral)   Wt (!) 147 kg (324 lb 9.6 oz)   SpO2 98%   BMI 52.42 kg/m²   Estimated body mass index is 52.42 kg/m² as calculated from the following:    Height as of 7/8/24: 167.6 cm (65.98\").    Weight as of this encounter: 147 kg (324 lb 9.6 oz).                   Physical Exam  Constitutional:       Appearance: She is obese.   Cardiovascular:      Rate and Rhythm: Normal rate.   Pulmonary:      Effort: Pulmonary effort is normal.      Breath sounds: Normal breath sounds. No wheezing.   Abdominal:      Palpations: Abdomen is soft.      Tenderness: There is no abdominal tenderness.   Musculoskeletal:         General: No swelling.      Cervical back: Neck supple. No tenderness. "      Comments: No dorsocervical fat pads   Skin:     Comments: No purple striae   Neurological:      Mental Status: She is alert and oriented to person, place, and time.   Psychiatric:         Mood and Affect: Mood normal.        Result Review :  The following data was reviewed by: Mahrokh Nokhbehzaeim, MD on 03/18/2025:  CMP          7/1/2024    09:40   CMP   Glucose 89    BUN 12    Creatinine 0.62    EGFR 119    Sodium 140    Potassium 4.3    Chloride 104    Calcium 9.4    Total Protein 7.0    Albumin 4.5    Globulin 2.5    Total Bilirubin 0.4    Alkaline Phosphatase 137    AST (SGOT) 14    ALT (SGPT) 12    BUN/Creatinine Ratio 19      CMP          7/1/2024    09:40   CMP   Glucose 89    BUN 12    Creatinine 0.62    EGFR 119    Sodium 140    Potassium 4.3    Chloride 104    Calcium 9.4    Total Protein 7.0    Albumin 4.5    Globulin 2.5    Total Bilirubin 0.4    Alkaline Phosphatase 137    AST (SGOT) 14    ALT (SGPT) 12    BUN/Creatinine Ratio 19                    Assessment and Plan   Diagnoses and all orders for this visit:    1. Hot flashes (Primary)  Assessment & Plan:  Normal menstrual cycles  Reports hot flashes  Takes norethindrone which can cause hot flashes  Check LH, FSH and estradiol  Has family history of early menopause      Orders:  -     FSH & LH  -     Estradiol    2. Class 3 severe obesity due to excess calories without serious comorbidity with body mass index (BMI) of 50.0 to 59.9 in adult  Assessment & Plan:  Patient's (Body mass index is 51.87 kg/m².)   Used to take Ozempic which was switched to Wegovy  Off wegovy   Currently looking for a job, she will call the office when she has insurance and wants to try GLP-1 agonist   We discussed portion control and increasing exercise.         3. Thyroid nodule  Assessment & Plan:  No compressive symptoms  Based on last ultrasound no further follow-up is required               Follow Up   Return in about 6 months (around 9/18/2025).  Patient was given  instructions and counseling regarding her condition or for health maintenance advice. Please see specific information pulled into the AVS if appropriate.

## 2025-03-18 NOTE — ASSESSMENT & PLAN NOTE
Patient's (Body mass index is 51.87 kg/m².)   Used to take Ozempic which was switched to Wegovy  Off wegovy   Currently looking for a job, she will call the office when she has insurance and wants to try GLP-1 agonist   We discussed portion control and increasing exercise.

## 2025-03-20 ENCOUNTER — TELEPHONE (OUTPATIENT)
Dept: ENDOCRINOLOGY | Age: 35
End: 2025-03-20
Payer: COMMERCIAL

## 2025-03-31 DIAGNOSIS — G43.109 MIGRAINE WITH AURA AND WITHOUT STATUS MIGRAINOSUS, NOT INTRACTABLE: ICD-10-CM

## 2025-03-31 RX ORDER — PROPRANOLOL HYDROCHLORIDE 80 MG/1
80 TABLET ORAL DAILY
Qty: 30 TABLET | Refills: 2 | Status: SHIPPED | OUTPATIENT
Start: 2025-03-31

## 2025-04-01 ENCOUNTER — SPECIALTY PHARMACY (OUTPATIENT)
Dept: ENDOCRINOLOGY | Age: 35
End: 2025-04-01
Payer: COMMERCIAL

## 2025-04-01 NOTE — PROGRESS NOTES
" Specialty Pharmacy Patient Management Program  Program Disenrollment      Belem Mcknight is a 34 y.o. female seen by an Endocrinology provider for Weight Management. Patient was previously enrolled in the Endocrinology Patient Management program offered by [facility].      Patient disenrolled as their specialty medication (Wegovy) was discontinued. Per 3/18/25 office note \"Off wegovy. Currently looking for a job, she will call the office when she has insurance and wants to try GLP-1 agonist.\"       It has been a pleasure taking part in this patients care and we would be happy to enroll them into the speciality pharmacy program again in the future.       Leola Yao, PharmD, BCACP, BC-ADM, [facility]  Clinical Specialty Pharmacist, Endocrinology  4/1/2025  15:18 EDT     "

## 2025-05-21 NOTE — PROGRESS NOTES
Chief Complaint   Patient presents with   • Routine Prenatal Visit     14w 5d. Recently called our office about stabbing type of pain in middle lower pelvic area. Pain level 1.        HPI:  Pt presents for routine prenatal visit.  She has had an occasional sharp pain in her midline lower abdomen.  It is very mild and fleeting.  She thought maybe it was a UTI versus constipation.  She has had constipation, has been using fiber but seems to have made it worse.  She denies any cramping or bleeding.  She thought she had been feeling fetal movement previously, but now is not.  Discussed that it would be early in her gestation to have consistent fetal movement.  She also notes an episode of dizziness recently, sounds like vasovagal episode    ROS:  No fever or chills, no nausea or vomiting, no contractions, no leg pain, no LE edema, no leaking fluid, no bleeding, no headache, no dysuria  All other systems reviewed and negative except as noted above    Exam:  See flow sheet  General:  Alert and oriented and no distress  Neck: no lymphadenopathy or thyromegaly  Lungs: non - labored breathing  Abd:  See flow sheet, fundus nontender  Ext: see flow sheet, non-tender bilateral , no lesions  Neuro: grossly normal    Assessment:/ PLAN:  29 y.o.  at 14w5d    Vasovagal episode-encouraged hydration, slow postural changes.  Small, frequent healthy snacks  Reassured her about not feeling fetal movement.  It is possible that she was not previously feeling movement, and she will not have consistent movement at this stage of pregnancy  Early GCT next visit  Anatomy scan in 4 weeks,  can come.      Ana Perdomo MD  2020  17:01         15

## 2025-05-31 NOTE — PROGRESS NOTES
[facility]   PROGRESS NOTE    Patient Name: Belem Mcknight  :  1990  MRN:  [id number]      Post-Op Day 1 S/P    Delivered a female infant.  Subjective     Patient reports:    Pain is well controlled. Voiding and ambulating without difficulty.    Tolerating po. Lochia normal.       The patient plans to breastfeed.         Objective       Vitals: Vital Signs Range for the last 24 hours  Temperature: Temp:  [97.6 °F (36.4 °C)-99 °F (37.2 °C)] 97.6 °F (36.4 °C)   Temp Source: Temp src: Oral   BP: BP: ()/(42-88) 102/66   Pulse: Heart Rate:  [71-97] 76   Respirations: Resp:  [16-18] 16         Intake/Output Summary (Last 24 hours) at 2021 0924  Last data filed at 2021 0600  Gross per 24 hour   Intake 950 ml   Output 2388 ml   Net -1438 ml                                              Physical Exam     General Alert and awake, in NAD      CV Regular rate and rhythm      Lungs clear to auscultation bilaterally        Abdomen Soft, non-distended, fundus firm,  1 below umbilicus, appropriately tender      Incision  Dressing clean, dry and intact.      Extremities  1+ edema, calves NT               LABS: Results from last 7 days   Lab Units 21  1112   WBC 10*3/mm3 10.82*   HEMOGLOBIN g/dL 13.5   HEMATOCRIT % 38.5   PLATELETS 10*3/mm3 198         Prenatal labs results reviewed:  Yes   Rubella:  Immune  Rh Status:    RH type   Date Value Ref Range Status   2021 Positive  Final                       Assessment/Plan   : 1. POD 1 S/P C/S: Hemodynamically stable.  Doing well.  Continue routine care.     AM labs pending      Pregnancy          Karon Milana Tobias, APRN  2021  09:24 EST     0.5

## 2025-06-06 ENCOUNTER — TELEMEDICINE (OUTPATIENT)
Dept: FAMILY MEDICINE CLINIC | Facility: CLINIC | Age: 35
End: 2025-06-06
Payer: COMMERCIAL

## 2025-06-06 DIAGNOSIS — F32.1 CURRENT MODERATE EPISODE OF MAJOR DEPRESSIVE DISORDER WITHOUT PRIOR EPISODE: Primary | ICD-10-CM

## 2025-06-06 PROCEDURE — 99213 OFFICE O/P EST LOW 20 MIN: CPT | Performed by: INTERNAL MEDICINE

## 2025-06-06 NOTE — ASSESSMENT & PLAN NOTE
Patient's depression is a single episode that is moderate without psychosis. Depression is active and newly identified.    Plan:   Referral to psychology  Referral to psychiatry    Followup at the next regular appointment.

## 2025-06-06 NOTE — PROGRESS NOTES
Chief Complaint  Primary Care Follow-Up (Pt requesting referral for mental health )    Subjective        Belem Mcknight presents to [facility] PRIMARY [facility]  Primary Care Follow-Up      History of Present Illness  The patient is a 34-year-old female who presents via virtual visit to discuss mental health issues.    She reports experiencing a cycle of emotional states, including unexplained rage, numbness, and extreme happiness, over the past 2 months. She has been aware of this for a while but attributed it to her stressful job and long commute, which included dropping off and picking up her children from . Her oldest child, aged 4, has behavioral issues and is on sedative and Adderall and is awaiting occupational therapy for extreme sensory input. Despite no longer working, she continues to experience these emotional fluctuations and desires a more balanced state.    She occasionally loses interest in previously enjoyed activities and experiences days of apathy. She describes a duality in her mindset, with one part being optimistic and the other self-deprecating. She recently started a ceramic business. She requires sleep aids to initiate sleep and often feels excessively tired during the day, not feeling motivated & often feels like lethargic.    She was previously seeing a counselor at [facility] for 6 visits, who suggested the possibility of depression, but she did not accept this diagnosis at the time. She now realizes that she cannot continue to cycle through these extreme emotional states. She is open to virtual visits with a psychiatrist. She has no history of depression but did experience postpartum anxiety with her first child, which she did not recognize until after the birth of her second child. She has taken online self-assessment tests for autism and believes she may be on the spectrum. She has done a lot of self-work but feels unable to improve her internal  "environment.      Objective   Vital Signs:  There were no vitals taken for this visit.  Estimated body mass index is 52.42 kg/m² as calculated from the following:    Height as of 7/8/24: 167.6 cm (65.98\").    Weight as of 3/18/25: 147 kg (324 lb 9.6 oz).          Physical Exam  Psychiatric:         Attention and Perception: Attention normal.         Mood and Affect: Mood is depressed. Affect is tearful.         Speech: Speech normal.         Behavior: Behavior is cooperative.          Result Review :                Assessment and Plan   Diagnoses and all orders for this visit:    1. Current moderate episode of major depressive disorder without prior episode (Primary)  Assessment & Plan:  Patient's depression is a single episode that is moderate without psychosis. Depression is active and newly identified.    Plan:   Referral to psychology  Referral to psychiatry    Followup at the next regular appointment.     Orders:  -     Ambulatory Referral to Psychiatry  -     Ambulatory Referral to Psychology        Assessment & Plan  1. Clinical depression.  - Symptoms include fatigue, hypersomnia, and anhedonia, indicative of clinical depression.  - Screening test score of 15 indicates moderate severity.  - Discussed the condition, reviewed the patient's history, and provided counseling on the importance of professional help.  - Referral to a psychiatrist and psychologist for cognitive behavioral therapy has been initiated. If there is no improvement with therapy alone, pharmacological intervention may be considered.           Follow Up   Return for Next scheduled follow up.  Patient was given instructions and counseling regarding her condition or for health maintenance advice. Please see specific information pulled into the AVS if appropriate.     Patient or patient representative verbalized consent for the use of Ambient Listening during the visit with  Leelee Keenan MD for chart documentation. 6/6/2025  10:27 " EDT      Mode of Visit: Video  Location of patient: -HOME-  [place] [place] [facility]  You have chosen to receive care through a telehealth visit.  The patient has signed the video visit consent form.  The visit included audio and video interaction. No technical issues occurred during this visit.

## 2025-06-08 DIAGNOSIS — Z30.41 ENCOUNTER FOR SURVEILLANCE OF CONTRACEPTIVE PILLS: ICD-10-CM

## 2025-06-09 RX ORDER — ACETAMINOPHEN AND CODEINE PHOSPHATE 120; 12 MG/5ML; MG/5ML
1 SOLUTION ORAL DAILY
Qty: 28 TABLET | Refills: 0 | Status: SHIPPED | OUTPATIENT
Start: 2025-06-09 | End: 2026-06-09

## 2025-06-09 NOTE — TELEPHONE ENCOUNTER
You should stil lhave refills of your birth control at [facility] pharmacy. Please contact them.    HUB may relay the message and schedule for Annual

## 2025-07-06 DIAGNOSIS — Z30.41 ENCOUNTER FOR SURVEILLANCE OF CONTRACEPTIVE PILLS: ICD-10-CM

## 2025-07-07 RX ORDER — ACETAMINOPHEN AND CODEINE PHOSPHATE 120; 12 MG/5ML; MG/5ML
1 SOLUTION ORAL DAILY
Qty: 28 TABLET | Refills: 0 | OUTPATIENT
Start: 2025-07-07 | End: 2026-07-07

## 2025-07-12 DIAGNOSIS — Z30.41 ENCOUNTER FOR SURVEILLANCE OF CONTRACEPTIVE PILLS: ICD-10-CM

## 2025-07-12 DIAGNOSIS — G43.109 MIGRAINE WITH AURA AND WITHOUT STATUS MIGRAINOSUS, NOT INTRACTABLE: ICD-10-CM

## 2025-07-14 RX ORDER — PROPRANOLOL HYDROCHLORIDE 80 MG/1
80 TABLET ORAL DAILY
Qty: 30 TABLET | Refills: 2 | Status: SHIPPED | OUTPATIENT
Start: 2025-07-14

## 2025-07-14 RX ORDER — ACETAMINOPHEN AND CODEINE PHOSPHATE 120; 12 MG/5ML; MG/5ML
1 SOLUTION ORAL DAILY
Qty: 28 TABLET | Refills: 0 | OUTPATIENT
Start: 2025-07-14 | End: 2026-07-14

## 2025-07-16 ENCOUNTER — TELEPHONE (OUTPATIENT)
Dept: PSYCHIATRY | Facility: CLINIC | Age: 35
End: 2025-07-16

## 2025-07-16 DIAGNOSIS — Z30.41 ENCOUNTER FOR SURVEILLANCE OF CONTRACEPTIVE PILLS: ICD-10-CM

## 2025-07-16 NOTE — TELEPHONE ENCOUNTER
LVM and sent my chart message in regards to today's appointment has been cancelled.  
normal sinus rhythm

## 2025-07-17 NOTE — TELEPHONE ENCOUNTER
Med refill. AE 6/14/24. [facility]. Thank you

## 2025-07-17 NOTE — TELEPHONE ENCOUNTER
Your prescription has not been refilled because you have not scheduled your annual with Jie Ramos CNM. Your last annual was 6/14/24 and we request that you schedule your annual for this year to refill your medication.

## 2025-07-18 RX ORDER — ACETAMINOPHEN AND CODEINE PHOSPHATE 120; 12 MG/5ML; MG/5ML
1 SOLUTION ORAL DAILY
Qty: 28 TABLET | Refills: 0 | OUTPATIENT
Start: 2025-07-18 | End: 2026-07-18

## 2025-08-28 ENCOUNTER — TELEMEDICINE (OUTPATIENT)
Dept: PSYCHIATRY | Facility: CLINIC | Age: 35
End: 2025-08-28
Payer: COMMERCIAL

## 2025-08-28 DIAGNOSIS — F41.1 GENERALIZED ANXIETY DISORDER: Chronic | ICD-10-CM

## 2025-08-28 DIAGNOSIS — F33.1 MAJOR DEPRESSIVE DISORDER, RECURRENT EPISODE, MODERATE: Chronic | ICD-10-CM

## 2025-08-28 DIAGNOSIS — F32.81 PMDD (PREMENSTRUAL DYSPHORIC DISORDER): Primary | Chronic | ICD-10-CM

## 2025-08-28 RX ORDER — SUMATRIPTAN 50 MG/1
50 TABLET, FILM COATED ORAL DAILY PRN
COMMUNITY

## (undated) DEVICE — GLV SURG BIOGEL LTX PF 6 1/2

## (undated) DEVICE — SUT MNCRYL PLS ANTIB UD 4/0 PS2 18IN

## (undated) DEVICE — SOL IRR H2O BTL 1000ML STRL

## (undated) DEVICE — SUT GUT CHRM 0 CT 27IN 914H

## (undated) DEVICE — SUT MNCRYL 3/0 SH 27IN UD MCP416H

## (undated) DEVICE — NDL BLNT 18G 1 1/2IN

## (undated) DEVICE — APPL HEMO ENDO SURGICEL 2IN1 1P/U STRL

## (undated) DEVICE — SUT MNCRYL 0 CT1 36IN UD MCP946H

## (undated) DEVICE — GLV SURG SENSICARE MICRO PF LF 6 STRL

## (undated) DEVICE — 3M(TM) TEGADERM(TM) TRANSPARENT FILM DRESSING FRAME STYLE 1627: Brand: 3M™ TEGADERM™

## (undated) DEVICE — SPONGE,LAP,18"X18",XR,ST,5/TRAY: Brand: MEDLINE

## (undated) DEVICE — GLV SURG SENSICARE MICRO PF LF 6.5 STRL

## (undated) DEVICE — PICO 7 10CM X 30CM: Brand: PICO™ 7

## (undated) DEVICE — SUT PDS 0 CTX 36IN VIO PDP370T

## (undated) DEVICE — ANTIBACTERIAL UNDYED BRAIDED (POLYGLACTIN 910), SYNTHETIC ABSORBABLE SUTURE: Brand: COATED VICRYL

## (undated) DEVICE — RETR PANNUS PANNI ADHS 1P/U LF STRL

## (undated) DEVICE — SUT PDS 0 CTX 36IN DYED Z370T

## (undated) DEVICE — SUT MNCRYL 0/0 CTX 36IN Y398H

## (undated) DEVICE — SUT GUT CHRM 3/0 CT1 3/0 36IN 922H

## (undated) DEVICE — KT ART BLD GAS QUICK DRAW

## (undated) DEVICE — SLV SCD CALF HEMOFORCE DVT THERP REPR LG

## (undated) DEVICE — SUT VIC 0 CT 36IN J958H

## (undated) DEVICE — ADHS SKIN DERMABOND TOPICAL HI VISC